# Patient Record
Sex: FEMALE | Race: BLACK OR AFRICAN AMERICAN | NOT HISPANIC OR LATINO | Employment: PART TIME | URBAN - METROPOLITAN AREA
[De-identification: names, ages, dates, MRNs, and addresses within clinical notes are randomized per-mention and may not be internally consistent; named-entity substitution may affect disease eponyms.]

---

## 2017-07-29 ENCOUNTER — HOSPITAL ENCOUNTER (EMERGENCY)
Facility: OTHER | Age: 22
Discharge: HOME | End: 2017-07-29
Attending: EMERGENCY MEDICINE
Payer: MEDICAID

## 2017-07-29 VITALS
SYSTOLIC BLOOD PRESSURE: 106 MMHG | TEMPERATURE: 98 F | DIASTOLIC BLOOD PRESSURE: 62 MMHG | WEIGHT: 130.31 LBS | BODY MASS INDEX: 21.68 KG/M2 | RESPIRATION RATE: 18 BRPM | HEART RATE: 48 BPM | OXYGEN SATURATION: 99 %

## 2017-07-29 DIAGNOSIS — R07.9 CHEST PAIN: ICD-10-CM

## 2017-07-29 DIAGNOSIS — D57.00 SICKLE CELL PAIN CRISIS: Primary | ICD-10-CM

## 2017-07-29 LAB
ALBUMIN SERPL BCP-MCNC: 4.4 G/DL
ALP SERPL-CCNC: 60 U/L
ALT SERPL W/O P-5'-P-CCNC: 10 U/L
ANION GAP SERPL CALC-SCNC: 11 MMOL/L
ANISOCYTOSIS BLD QL SMEAR: SLIGHT
AST SERPL-CCNC: 16 U/L
B-HCG UR QL: NEGATIVE
BACTERIA #/AREA URNS HPF: ABNORMAL /HPF
BASOPHILS # BLD AUTO: 0.01 K/UL
BASOPHILS NFR BLD: 0.2 %
BILIRUB SERPL-MCNC: 1.6 MG/DL
BILIRUB UR QL STRIP: NEGATIVE
BUN SERPL-MCNC: 8 MG/DL
CALCIUM SERPL-MCNC: 9.3 MG/DL
CHLORIDE SERPL-SCNC: 105 MMOL/L
CLARITY UR: CLEAR
CO2 SERPL-SCNC: 24 MMOL/L
COLOR UR: YELLOW
CREAT SERPL-MCNC: 0.8 MG/DL
CTP QC/QA: YES
DIFFERENTIAL METHOD: ABNORMAL
EOSINOPHIL # BLD AUTO: 0.1 K/UL
EOSINOPHIL NFR BLD: 0.8 %
ERYTHROCYTE [DISTWIDTH] IN BLOOD BY AUTOMATED COUNT: 14.2 %
EST. GFR  (AFRICAN AMERICAN): >60 ML/MIN/1.73 M^2
EST. GFR  (NON AFRICAN AMERICAN): >60 ML/MIN/1.73 M^2
GIANT PLATELETS BLD QL SMEAR: PRESENT
GLUCOSE SERPL-MCNC: 100 MG/DL
GLUCOSE UR QL STRIP: NEGATIVE
HCT VFR BLD AUTO: 31.1 %
HGB BLD-MCNC: 11.3 G/DL
HGB UR QL STRIP: ABNORMAL
INR PPP: 1
KETONES UR QL STRIP: NEGATIVE
LEUKOCYTE ESTERASE UR QL STRIP: NEGATIVE
LYMPHOCYTES # BLD AUTO: 1.7 K/UL
LYMPHOCYTES NFR BLD: 25.7 %
MAGNESIUM SERPL-MCNC: 2.3 MG/DL
MCH RBC QN AUTO: 30.4 PG
MCHC RBC AUTO-ENTMCNC: 36.3 G/DL
MCV RBC AUTO: 84 FL
MICROSCOPIC COMMENT: ABNORMAL
MONOCYTES # BLD AUTO: 0.3 K/UL
MONOCYTES NFR BLD: 4 %
NEUTROPHILS # BLD AUTO: 4.5 K/UL
NEUTROPHILS NFR BLD: 69.3 %
NITRITE UR QL STRIP: NEGATIVE
NON-SQ EPI CELLS #/AREA URNS HPF: 2 /HPF
OVALOCYTES BLD QL SMEAR: ABNORMAL
PH UR STRIP: 6 [PH] (ref 5–8)
PLATELET # BLD AUTO: 102 K/UL
PLATELET BLD QL SMEAR: ABNORMAL
PMV BLD AUTO: ABNORMAL FL
POIKILOCYTOSIS BLD QL SMEAR: SLIGHT
POLYCHROMASIA BLD QL SMEAR: ABNORMAL
POTASSIUM SERPL-SCNC: 3.5 MMOL/L
PROT SERPL-MCNC: 8.1 G/DL
PROT UR QL STRIP: ABNORMAL
PROTHROMBIN TIME: 11.6 SEC
RBC # BLD AUTO: 3.72 M/UL
RBC #/AREA URNS HPF: 7 /HPF (ref 0–4)
RETICS/RBC NFR AUTO: 4.3 %
SODIUM SERPL-SCNC: 140 MMOL/L
SP GR UR STRIP: 1.01 (ref 1–1.03)
SQUAMOUS #/AREA URNS HPF: 5 /HPF
TARGETS BLD QL SMEAR: ABNORMAL
URN SPEC COLLECT METH UR: ABNORMAL
UROBILINOGEN UR STRIP-ACNC: NEGATIVE EU/DL
WBC # BLD AUTO: 6.53 K/UL

## 2017-07-29 PROCEDURE — 63600175 PHARM REV CODE 636 W HCPCS: Performed by: EMERGENCY MEDICINE

## 2017-07-29 PROCEDURE — 85610 PROTHROMBIN TIME: CPT

## 2017-07-29 PROCEDURE — 83735 ASSAY OF MAGNESIUM: CPT

## 2017-07-29 PROCEDURE — 81025 URINE PREGNANCY TEST: CPT | Performed by: EMERGENCY MEDICINE

## 2017-07-29 PROCEDURE — 93010 ELECTROCARDIOGRAM REPORT: CPT | Mod: ,,, | Performed by: INTERNAL MEDICINE

## 2017-07-29 PROCEDURE — 81000 URINALYSIS NONAUTO W/SCOPE: CPT

## 2017-07-29 PROCEDURE — 25000003 PHARM REV CODE 250: Performed by: EMERGENCY MEDICINE

## 2017-07-29 PROCEDURE — 80053 COMPREHEN METABOLIC PANEL: CPT

## 2017-07-29 PROCEDURE — 85025 COMPLETE CBC W/AUTO DIFF WBC: CPT

## 2017-07-29 PROCEDURE — 85045 AUTOMATED RETICULOCYTE COUNT: CPT

## 2017-07-29 RX ORDER — ONDANSETRON 2 MG/ML
4 INJECTION INTRAMUSCULAR; INTRAVENOUS
Status: COMPLETED | OUTPATIENT
Start: 2017-07-29 | End: 2017-07-29

## 2017-07-29 RX ORDER — HYDROCODONE BITARTRATE AND ACETAMINOPHEN 5; 325 MG/1; MG/1
1 TABLET ORAL
Status: COMPLETED | OUTPATIENT
Start: 2017-07-29 | End: 2017-07-29

## 2017-07-29 RX ORDER — MORPHINE SULFATE 4 MG/ML
8 INJECTION, SOLUTION INTRAMUSCULAR; INTRAVENOUS
Status: COMPLETED | OUTPATIENT
Start: 2017-07-29 | End: 2017-07-29

## 2017-07-29 RX ORDER — MORPHINE SULFATE 2 MG/ML
6 INJECTION, SOLUTION INTRAMUSCULAR; INTRAVENOUS
Status: COMPLETED | OUTPATIENT
Start: 2017-07-29 | End: 2017-07-29

## 2017-07-29 RX ORDER — HYDROCODONE BITARTRATE AND ACETAMINOPHEN 5; 325 MG/1; MG/1
1 TABLET ORAL EVERY 6 HOURS PRN
Qty: 12 TABLET | Refills: 0 | Status: SHIPPED | OUTPATIENT
Start: 2017-07-29 | End: 2017-08-29

## 2017-07-29 RX ORDER — FOLIC ACID 1 MG/1
1 TABLET ORAL DAILY
Status: ON HOLD | COMMUNITY
End: 2017-08-02 | Stop reason: CLARIF

## 2017-07-29 RX ORDER — ONDANSETRON 4 MG/1
4 TABLET, FILM COATED ORAL EVERY 6 HOURS
Qty: 12 TABLET | Refills: 0 | Status: ON HOLD | OUTPATIENT
Start: 2017-07-29 | End: 2017-08-02 | Stop reason: CLARIF

## 2017-07-29 RX ADMIN — MORPHINE SULFATE 8 MG: 4 INJECTION, SOLUTION INTRAMUSCULAR; INTRAVENOUS at 10:07

## 2017-07-29 RX ADMIN — ONDANSETRON 4 MG: 2 INJECTION INTRAMUSCULAR; INTRAVENOUS at 11:07

## 2017-07-29 RX ADMIN — MORPHINE SULFATE 6 MG: 2 INJECTION, SOLUTION INTRAMUSCULAR; INTRAVENOUS at 08:07

## 2017-07-29 RX ADMIN — SODIUM CHLORIDE 1000 ML: 0.9 INJECTION, SOLUTION INTRAVENOUS at 10:07

## 2017-07-29 RX ADMIN — SODIUM CHLORIDE 1000 ML: 0.9 INJECTION, SOLUTION INTRAVENOUS at 08:07

## 2017-07-29 RX ADMIN — HYDROCODONE BITARTRATE AND ACETAMINOPHEN 1 TABLET: 5; 325 TABLET ORAL at 01:07

## 2017-07-29 NOTE — ED TRIAGE NOTES
Patient reports having right side body pain, especially in her right arm since yesterday. Patient reports non-compliance with her iron and folic acid supplement and also reports that tylenol does not help her pain. She reports that this is how her sickle cell flare ups generally manifest.

## 2017-07-29 NOTE — ED PROVIDER NOTES
"Encounter Date: 7/29/2017    SCRIBE #1 NOTE: I, Nadeen Lord, am scribing for, and in the presence of, Dr. Connors.       History     Chief Complaint   Patient presents with    Sickle Cell Pain Crisis     pt c/o sickle cell pain crisis with 10/10 pain to the R side of body     Time seen by provider: 7:35 AM    This is a 21 y.o. female who presents with complaint of a Sickle Cell Pain Crisis that has persisted for approximately 24 hours.  The patient reports history of Sickle Cell Anemia, CVA, blood transfusions, and left eye surgery.  She states that the pain is localized to the right-side of her body and claims that she can "feel my veins popping in my neck."  She states that it feels like "my muscles aren't working" and also endorses right-sided weakness and anxiety. She woke up and felt normal yesterday, but onset of R arm pain and then weakness started at 11am, and slowly worsened throughout the day. She had to work a double shift despite the pain and weakness and came straight to ED afterwards. She now states her R leg is locked up, and cannot move R arm due to pain. The patient mentions that she had an episode of epistaxis earlier yesterday, but it has since resolved.  She reports no fever, chills, rhinorrhea, sore throat, abdominal pain, CP, or N/V/D.  Although she attempted to alleviate her discomfort with tylenol, she found no relief.  She mentions no other identifying, alleviating, or exacerbating factors.  The patient states that the current presentation of one-sided pain is consistent with prior sickle cell pain crises. She usually has associated one sided weakness with pain crisis, that resolves with treatment. She mentions one episode in 2012 where she didn't get treated fast enough and had a stroke resulting in L eye vision loss and L sided weakness that she needed rehab for. She reports that she only has 2 to 3 crises per year, which seem to be triggered by weather changes, with rare ED visits. "  She reports that she does not take prescription pain medication regularly to treat her sickle cell anemia.  The patient claims that she does not have a PCP, as she recently moved to [place] from [place].      The history is provided by the patient.     Review of patient's allergies indicates:  No Known Allergies  Past Medical History:   Diagnosis Date    Active labor 2014    Sickle cell anemia     STD (female)     Chlamydia/trichomonas     Stroke      Past Surgical History:   Procedure Laterality Date     SECTION  14    EYE SURGERY      left eye     Family History   Problem Relation Age of Onset    Breast cancer Neg Hx     Colon cancer Neg Hx     Ovarian cancer Neg Hx      Social History   Substance Use Topics    Smoking status: Never Smoker    Smokeless tobacco: Never Used    Alcohol use No     Review of Systems   Constitutional: Negative for chills and fever.   HENT: Negative for facial swelling.    Respiratory: Negative for shortness of breath.    Cardiovascular: Negative for chest pain.   Gastrointestinal: Negative for abdominal pain, nausea and vomiting.   Endocrine: Negative for polyuria.   Genitourinary: Negative for dysuria.   Musculoskeletal: Negative for myalgias.        Positive for right-sided pain.   Skin: Negative for rash.   Neurological: Positive for weakness (right-sided). Negative for headaches.   Psychiatric/Behavioral: The patient is nervous/anxious.        Physical Exam     Initial Vitals [17 0648]   BP Pulse Resp Temp SpO2   138/88 62 18 97.7 °F (36.5 °C) 97 %      MAP       104.67         Physical Exam    Nursing note and vitals reviewed.  Constitutional: She appears well-developed and well-nourished. She is not diaphoretic. No distress.   HENT:   Head: Normocephalic and atraumatic.   Right Ear: External ear normal.   Left Ear: External ear normal.   Mouth/Throat: Mucous membranes are dry.   Eyes:   Left prosthetic eye.   Neck: Normal range of motion.    Cardiovascular: Normal rate, regular rhythm and normal heart sounds. Exam reveals no gallop and no friction rub.    No murmur heard.  Pulmonary/Chest: Breath sounds normal. No respiratory distress. She has no wheezes. She has no rhonchi. She has no rales.   Abdominal: Soft. There is no tenderness. There is no rebound and no guarding.   Musculoskeletal: Normal range of motion. She exhibits no edema or tenderness.   Neurological: She is alert and oriented to person, place, and time. She has normal strength. No sensory deficit.   Sensation intact.  Unable to assess strength due to pain limitation.     Skin: Skin is warm and dry. No rash and no abscess noted. No erythema. No pallor.   Psychiatric: She has a normal mood and affect. Her behavior is normal. Judgment and thought content normal.         ED Course   Procedures  Labs Reviewed   CBC W/ AUTO DIFFERENTIAL - Abnormal; Notable for the following:        Result Value    RBC 3.72 (*)     Hemoglobin 11.3 (*)     Hematocrit 31.1 (*)     MCHC 36.3 (*)     Platelets 102 (*)     Platelet Estimate Decreased (*)     All other components within normal limits   COMPREHENSIVE METABOLIC PANEL - Abnormal; Notable for the following:     Total Bilirubin 1.6 (*)     All other components within normal limits   URINALYSIS - Abnormal; Notable for the following:     Protein, UA Trace (*)     Occult Blood UA 1+ (*)     All other components within normal limits   RETICULOCYTES - Abnormal; Notable for the following:     Retic 4.3 (*)     All other components within normal limits   URINALYSIS MICROSCOPIC - Abnormal; Notable for the following:     RBC, UA 7 (*)     Non-Squam Epith 2 (*)     All other components within normal limits   PROTIME-INR   MAGNESIUM   POCT URINE PREGNANCY      Imaging Results          CT Head Without Contrast (Final result)  Result time 07/29/17 09:44:11    Final result by Vance Morin MD (07/29/17 09:44:11)                 Impression:         No intracranial  hemorrhage or mass effect.               Electronically signed by: Dr. Vance Morin MD  Date:     07/29/17  Time:    09:44              Narrative:    CT OF THE HEAD WITHOUT CONTRAST:     Technique: 5 mm axial images were obtained from the vertex to the skullbase, without administration of contrast.    Comparison: None.    Findings:    No intracranial hemorrhage, extra-axial fluid collection, midline shift, or mass effect.  No evidence of an acute cortical infarct or of an infarct in a major vascular territory.  The ventricles are normal in size and configuration, and the basilar cisterns are patent.      The calvarium is unremarkable. Visualized portions of the paranasal sinuses are clear. Visualized mastoid air cells are clear. LEFT ocular prosthesis.                              EKG Readings: (Independently Interpreted)   Initial Reading: No STEMI.   Normal sinus rhythm. Rate of 76. No ST or T wave changes. No previous for comparison.          Medical Decision Making:   History:   Old Medical Records: I decided to obtain old medical records.  Initial Assessment:       21F h/o SCD presents with diffuse R sided body pain and weakness, started yesterday morning and worsening since. Per pt this is typical of her sickle cell pain crisis, with one sided body pain and weakness that resolves with treatment. Unclear cause of pain crisis, possibly related to dehydration or overworking, will also evaluate for worsening anemia. No fever or infectious symptoms, no CP/SOB or sign of acute chest syndrome. Also concern for recurrent CVA; unable to do complete neuro testing on initial exam since pt refuses to move R side due to pain, but will closely monitor after pain control/IVF. Although she reports similar symptoms with pain crisis, she also does have h/o CVA 5 years ago. She is out of TPA window. Will get head CT and re-assess.      Update:  After 1st round Morphine, pt able to fully move leg with 5/5 strength. After 2nd  round, able to move R arm though still feels pain in R shoulder. No neuro deficits, CT head with no acute findings, no sign CVA, and very unlikely TIA given initial reluctance to move R side likely due to pain, not true weakness, and multiple previous similar episodes related to sickle cell pain crises. Labs with Hgb 11.3 and slightly elevated retic count, no other acute process. Pt does have documented (+) sickle screen in 2015, and no recent Rx on [place] , no recent visits to [facility] or other [facility] facilities, so no sign drug seeking or malingering. Observed in ED with mild nausea subsequently, but tolerating PO and minimal RUE pain on re-assessment after a few hours, no recurrent weakness. Pt offered admission for observation but is requesting discharge; advised about close f/u and returning to ED immediately for any recurrent symptoms.       Independently Interpreted Test(s):   I have ordered and independently interpreted EKG Reading(s) - see prior notes  Clinical Tests:   Lab Tests: Ordered and Reviewed  Radiological Study: Ordered and Reviewed  Medical Tests: Ordered and Reviewed            Scribe Attestation:   Scribe #1: I performed the above scribed service and the documentation accurately describes the services I performed. I attest to the accuracy of the note.    Attending Attestation:           Physician Attestation for Scribe:  Physician Attestation Statement for Scribe #1: I, Dr. Connors, reviewed documentation, as scribed by Nadeen Lord in my presence, and it is both accurate and complete.                 ED Course     Clinical Impression:     1. Sickle cell pain crisis    2. Chest pain                                 Matt Connors MD  07/29/17 [id number]

## 2017-07-30 PROBLEM — D57.00 SICKLE CELL PAIN CRISIS: Status: ACTIVE | Noted: 2017-07-30

## 2017-07-30 PROBLEM — I63.9 STROKE: Status: ACTIVE | Noted: 2017-07-30

## 2017-07-30 PROBLEM — D57.00 SICKLE CELL CRISIS: Status: ACTIVE | Noted: 2017-07-30

## 2017-08-01 ENCOUNTER — HOSPITAL ENCOUNTER (INPATIENT)
Facility: HOSPITAL | Age: 22
LOS: 1 days | Discharge: HOME | DRG: 812 | End: 2017-08-03
Attending: EMERGENCY MEDICINE | Admitting: HOSPITALIST
Payer: COMMERCIAL

## 2017-08-01 DIAGNOSIS — R07.9 CHEST PAIN: ICD-10-CM

## 2017-08-01 DIAGNOSIS — D57.219 HEMOGLOBIN S-C DISEASE, WITH UNSPECIFIED CRISIS: ICD-10-CM

## 2017-08-01 DIAGNOSIS — D57.00 SICKLE CELL CRISIS: Primary | ICD-10-CM

## 2017-08-01 PROBLEM — G45.9 TIA (TRANSIENT ISCHEMIC ATTACK): Status: ACTIVE | Noted: 2017-07-30

## 2017-08-01 PROCEDURE — 99285 EMERGENCY DEPT VISIT HI MDM: CPT | Mod: ,,, | Performed by: EMERGENCY MEDICINE

## 2017-08-01 PROCEDURE — 99285 EMERGENCY DEPT VISIT HI MDM: CPT | Mod: 25

## 2017-08-01 PROCEDURE — 25000003 PHARM REV CODE 250

## 2017-08-01 PROCEDURE — 93005 ELECTROCARDIOGRAM TRACING: CPT

## 2017-08-01 PROCEDURE — 96375 TX/PRO/DX INJ NEW DRUG ADDON: CPT

## 2017-08-01 PROCEDURE — 96374 THER/PROPH/DIAG INJ IV PUSH: CPT

## 2017-08-01 PROCEDURE — 96376 TX/PRO/DX INJ SAME DRUG ADON: CPT

## 2017-08-01 PROCEDURE — 93010 ELECTROCARDIOGRAM REPORT: CPT | Mod: ,,, | Performed by: INTERNAL MEDICINE

## 2017-08-01 PROCEDURE — 63600175 PHARM REV CODE 636 W HCPCS

## 2017-08-01 PROCEDURE — 96361 HYDRATE IV INFUSION ADD-ON: CPT

## 2017-08-01 RX ORDER — HYDROMORPHONE HYDROCHLORIDE 1 MG/ML
1 INJECTION, SOLUTION INTRAMUSCULAR; INTRAVENOUS; SUBCUTANEOUS
Status: COMPLETED | OUTPATIENT
Start: 2017-08-01 | End: 2017-08-01

## 2017-08-01 RX ORDER — SODIUM CHLORIDE 9 MG/ML
1000 INJECTION, SOLUTION INTRAVENOUS
Status: COMPLETED | OUTPATIENT
Start: 2017-08-01 | End: 2017-08-02

## 2017-08-01 RX ORDER — ONDANSETRON 2 MG/ML
4 INJECTION INTRAMUSCULAR; INTRAVENOUS
Status: COMPLETED | OUTPATIENT
Start: 2017-08-01 | End: 2017-08-01

## 2017-08-01 RX ADMIN — SODIUM CHLORIDE 1000 ML: 0.9 INJECTION, SOLUTION INTRAVENOUS at 11:08

## 2017-08-01 RX ADMIN — ONDANSETRON 4 MG: 2 INJECTION INTRAMUSCULAR; INTRAVENOUS at 11:08

## 2017-08-01 RX ADMIN — HYDROMORPHONE HYDROCHLORIDE 1 MG: 1 INJECTION, SOLUTION INTRAMUSCULAR; INTRAVENOUS; SUBCUTANEOUS at 11:08

## 2017-08-02 PROCEDURE — 25000003 PHARM REV CODE 250: Performed by: STUDENT IN AN ORGANIZED HEALTH CARE EDUCATION/TRAINING PROGRAM

## 2017-08-02 PROCEDURE — 11000001 HC ACUTE MED/SURG PRIVATE ROOM

## 2017-08-02 PROCEDURE — 63600175 PHARM REV CODE 636 W HCPCS

## 2017-08-02 PROCEDURE — 97165 OT EVAL LOW COMPLEX 30 MIN: CPT

## 2017-08-02 PROCEDURE — 99223 1ST HOSP IP/OBS HIGH 75: CPT | Mod: ,,, | Performed by: HOSPITALIST

## 2017-08-02 PROCEDURE — 63600175 PHARM REV CODE 636 W HCPCS: Performed by: HOSPITALIST

## 2017-08-02 PROCEDURE — 63600175 PHARM REV CODE 636 W HCPCS: Performed by: STUDENT IN AN ORGANIZED HEALTH CARE EDUCATION/TRAINING PROGRAM

## 2017-08-02 PROCEDURE — 25000003 PHARM REV CODE 250: Performed by: HOSPITALIST

## 2017-08-02 PROCEDURE — A4216 STERILE WATER/SALINE, 10 ML: HCPCS | Performed by: STUDENT IN AN ORGANIZED HEALTH CARE EDUCATION/TRAINING PROGRAM

## 2017-08-02 PROCEDURE — 94761 N-INVAS EAR/PLS OXIMETRY MLT: CPT

## 2017-08-02 RX ORDER — SODIUM CHLORIDE 0.9 % (FLUSH) 0.9 %
3 SYRINGE (ML) INJECTION EVERY 8 HOURS
Status: DISCONTINUED | OUTPATIENT
Start: 2017-08-02 | End: 2017-08-03 | Stop reason: HOSPADM

## 2017-08-02 RX ORDER — FERROUS SULFATE 325(65) MG
325 TABLET, DELAYED RELEASE (ENTERIC COATED) ORAL DAILY
Status: DISCONTINUED | OUTPATIENT
Start: 2017-08-02 | End: 2017-08-03 | Stop reason: HOSPADM

## 2017-08-02 RX ORDER — NAPROXEN SODIUM 220 MG/1
81 TABLET, FILM COATED ORAL DAILY
Status: DISCONTINUED | OUTPATIENT
Start: 2017-08-02 | End: 2017-08-03 | Stop reason: HOSPADM

## 2017-08-02 RX ORDER — MAGNESIUM SULFATE HEPTAHYDRATE 40 MG/ML
2 INJECTION, SOLUTION INTRAVENOUS ONCE
Status: COMPLETED | OUTPATIENT
Start: 2017-08-02 | End: 2017-08-02

## 2017-08-02 RX ORDER — ENOXAPARIN SODIUM 100 MG/ML
40 INJECTION SUBCUTANEOUS EVERY 24 HOURS
Status: DISCONTINUED | OUTPATIENT
Start: 2017-08-02 | End: 2017-08-02

## 2017-08-02 RX ORDER — POTASSIUM CHLORIDE 20 MEQ/1
20 TABLET, EXTENDED RELEASE ORAL ONCE
Status: DISCONTINUED | OUTPATIENT
Start: 2017-08-02 | End: 2017-08-02

## 2017-08-02 RX ORDER — HYDROMORPHONE HYDROCHLORIDE 2 MG/ML
2 INJECTION, SOLUTION INTRAMUSCULAR; INTRAVENOUS; SUBCUTANEOUS
Status: DISPENSED | OUTPATIENT
Start: 2017-08-02 | End: 2017-08-03

## 2017-08-02 RX ORDER — POTASSIUM CHLORIDE 20 MEQ/1
40 TABLET, EXTENDED RELEASE ORAL ONCE
Status: COMPLETED | OUTPATIENT
Start: 2017-08-02 | End: 2017-08-02

## 2017-08-02 RX ORDER — SODIUM CHLORIDE 9 MG/ML
INJECTION, SOLUTION INTRAVENOUS CONTINUOUS
Status: DISCONTINUED | OUTPATIENT
Start: 2017-08-02 | End: 2017-08-03 | Stop reason: HOSPADM

## 2017-08-02 RX ORDER — HYDROMORPHONE HYDROCHLORIDE 1 MG/ML
2 INJECTION, SOLUTION INTRAMUSCULAR; INTRAVENOUS; SUBCUTANEOUS EVERY 6 HOURS PRN
Status: DISCONTINUED | OUTPATIENT
Start: 2017-08-02 | End: 2017-08-02

## 2017-08-02 RX ORDER — AMOXICILLIN 250 MG
1 CAPSULE ORAL DAILY PRN
Status: DISCONTINUED | OUTPATIENT
Start: 2017-08-02 | End: 2017-08-03 | Stop reason: HOSPADM

## 2017-08-02 RX ORDER — HYDROXYUREA 500 MG/1
1000 CAPSULE ORAL DAILY
Status: DISCONTINUED | OUTPATIENT
Start: 2017-08-02 | End: 2017-08-03 | Stop reason: HOSPADM

## 2017-08-02 RX ORDER — OXYCODONE HYDROCHLORIDE 5 MG/1
15 TABLET ORAL EVERY 4 HOURS PRN
Status: DISCONTINUED | OUTPATIENT
Start: 2017-08-02 | End: 2017-08-03 | Stop reason: HOSPADM

## 2017-08-02 RX ORDER — ONDANSETRON 8 MG/1
8 TABLET, ORALLY DISINTEGRATING ORAL EVERY 8 HOURS PRN
Status: DISCONTINUED | OUTPATIENT
Start: 2017-08-02 | End: 2017-08-03 | Stop reason: HOSPADM

## 2017-08-02 RX ORDER — FOLIC ACID 1 MG/1
1 TABLET ORAL DAILY
Status: DISCONTINUED | OUTPATIENT
Start: 2017-08-02 | End: 2017-08-03 | Stop reason: HOSPADM

## 2017-08-02 RX ORDER — HYDROMORPHONE HYDROCHLORIDE 1 MG/ML
1 INJECTION, SOLUTION INTRAMUSCULAR; INTRAVENOUS; SUBCUTANEOUS
Status: COMPLETED | OUTPATIENT
Start: 2017-08-02 | End: 2017-08-02

## 2017-08-02 RX ORDER — ACETAMINOPHEN 500 MG
500 TABLET ORAL EVERY 6 HOURS PRN
Status: DISCONTINUED | OUTPATIENT
Start: 2017-08-02 | End: 2017-08-03 | Stop reason: HOSPADM

## 2017-08-02 RX ORDER — MORPHINE SULFATE 4 MG/ML
4 INJECTION, SOLUTION INTRAMUSCULAR; INTRAVENOUS EVERY 6 HOURS PRN
Status: DISCONTINUED | OUTPATIENT
Start: 2017-08-02 | End: 2017-08-02

## 2017-08-02 RX ORDER — HYDROMORPHONE HYDROCHLORIDE 1 MG/ML
2 INJECTION, SOLUTION INTRAMUSCULAR; INTRAVENOUS; SUBCUTANEOUS
Status: DISCONTINUED | OUTPATIENT
Start: 2017-08-02 | End: 2017-08-02

## 2017-08-02 RX ADMIN — ASPIRIN 81 MG CHEWABLE TABLET 81 MG: 81 TABLET CHEWABLE at 09:08

## 2017-08-02 RX ADMIN — ONDANSETRON 8 MG: 4 TABLET, ORALLY DISINTEGRATING ORAL at 06:08

## 2017-08-02 RX ADMIN — HYDROMORPHONE HYDROCHLORIDE 2 MG: 1 INJECTION, SOLUTION INTRAMUSCULAR; INTRAVENOUS; SUBCUTANEOUS at 06:08

## 2017-08-02 RX ADMIN — HYDROMORPHONE HYDROCHLORIDE 2 MG: 2 INJECTION INTRAMUSCULAR; INTRAVENOUS; SUBCUTANEOUS at 08:08

## 2017-08-02 RX ADMIN — FERROUS SULFATE TAB EC 325 MG (65 MG FE EQUIVALENT) 325 MG: 325 (65 FE) TABLET DELAYED RESPONSE at 09:08

## 2017-08-02 RX ADMIN — SODIUM CHLORIDE: 0.9 INJECTION, SOLUTION INTRAVENOUS at 10:08

## 2017-08-02 RX ADMIN — FOLIC ACID 1 MG: 1 TABLET ORAL at 09:08

## 2017-08-02 RX ADMIN — HYDROMORPHONE HYDROCHLORIDE 2 MG: 2 INJECTION INTRAMUSCULAR; INTRAVENOUS; SUBCUTANEOUS at 12:08

## 2017-08-02 RX ADMIN — HYDROMORPHONE HYDROCHLORIDE 2 MG: 2 INJECTION INTRAMUSCULAR; INTRAVENOUS; SUBCUTANEOUS at 11:08

## 2017-08-02 RX ADMIN — SODIUM CHLORIDE: 0.9 INJECTION, SOLUTION INTRAVENOUS at 09:08

## 2017-08-02 RX ADMIN — MAGNESIUM SULFATE IN WATER 2 G: 40 INJECTION, SOLUTION INTRAVENOUS at 09:08

## 2017-08-02 RX ADMIN — HYDROMORPHONE HYDROCHLORIDE 2 MG: 2 INJECTION INTRAMUSCULAR; INTRAVENOUS; SUBCUTANEOUS at 06:08

## 2017-08-02 RX ADMIN — Medication 3 ML: at 02:08

## 2017-08-02 RX ADMIN — HYDROMORPHONE HYDROCHLORIDE 2 MG: 1 INJECTION, SOLUTION INTRAMUSCULAR; INTRAVENOUS; SUBCUTANEOUS at 09:08

## 2017-08-02 RX ADMIN — HYDROMORPHONE HYDROCHLORIDE 1 MG: 1 INJECTION, SOLUTION INTRAMUSCULAR; INTRAVENOUS; SUBCUTANEOUS at 02:08

## 2017-08-02 RX ADMIN — POTASSIUM CHLORIDE 40 MEQ: 1500 TABLET, EXTENDED RELEASE ORAL at 09:08

## 2017-08-02 RX ADMIN — ONDANSETRON 8 MG: 4 TABLET, ORALLY DISINTEGRATING ORAL at 12:08

## 2017-08-02 RX ADMIN — HYDROXYUREA 1000 MG: 500 CAPSULE ORAL at 09:08

## 2017-08-02 NOTE — H&P
"[facility] Hospital Medicine  History & Physical    Patient Name: Murali Tarango  MRN: [id number]  Admission Date: 8/1/2017  Attending Physician: Maykel Zendejas MD   Primary Care Provider: Esteban Powell MD    [facility] Medicine Team: [facility] 1 Brandi Candelario MD     Patient information was obtained from patient and past medical records.     Subjective:     Principal Problem:Sickle cell crisis    Chief Complaint:   Chief Complaint   Patient presents with    Sickle Cell Pain Crisis     Pt was seen in [place] at [facility] today and was d/c. Pt states that she is still in a lot of pain and that it hurts all over.       HPI: Pt is a 20 y/o F with history of sickle cell disease (Hgb SC), h/o CVA (w/monoocular weakness in 2014), who presents for right arm pain.     Pt reports pain crises started last Friday - she initially presented to [facility] and they discharged her in a few hours. Her mom then picked her up and brought her to [place] ([facility] ER) who discharged after a day as well. She came back to [place] (this is her home) and was in "unbearable pain" so came to the [facility].   Pt does not take hydroxyurea or folate tabs.     She reports that her pain is a  10/10 shooting pain starting at her shoulders and radiating down her right arms. She denies any pain in her right leg. She denies lack of ROM just complains of pain limiting movement.   In the ER, there was concern that she may have a stoke but MRI brain was negative.     Pt reports avg of 3 crises per year (early, mid and end of year) that is associated with temp change and weather change. Her episodes range from a few hours to a few days.   She denies any recent illnesses, fevers, chills, SOB. Her chest pain has resolved. She has never seen a sickle cell specialist. She is a mother of 2 children - 1 yrs and 2 yrs and had no complications during pregnancy.    Past Medical History:   Diagnosis Date    Active labor 11/24/2014 "    Encounter for blood transfusion     Sickle cell anemia     STD (female)     Chlamydia/trichomonas     Stroke        Past Surgical History:   Procedure Laterality Date     SECTION  14    EYE SURGERY      left eye       Review of patient's allergies indicates:  No Known Allergies    Current Facility-Administered Medications on File Prior to Encounter   Medication    [COMPLETED] magnesium sulfate 2g in water 50mL IVPB (premix)    [COMPLETED] potassium chloride SA CR tablet 40 mEq    [DISCONTINUED] 0.9%  NaCl infusion    [DISCONTINUED] acetaminophen tablet 650 mg    [DISCONTINUED] aspirin chewable tablet 81 mg    [DISCONTINUED] aspirin tablet 325 mg    [DISCONTINUED] dextrose 50% injection 12.5 g    [DISCONTINUED] dextrose 50% injection 25 g    [DISCONTINUED] diphenhydrAMINE injection 25 mg    [DISCONTINUED] ferrous sulfate tablet 325 mg    [DISCONTINUED] folic acid tablet 1 mg    [DISCONTINUED] glucagon (human recombinant) injection 1 mg    [DISCONTINUED] glucose chewable tablet 16 g    [DISCONTINUED] glucose chewable tablet 24 g    [DISCONTINUED] hydroxyurea capsule 500 mg    [DISCONTINUED] morphine injection 2 mg    [DISCONTINUED] ondansetron injection 4 mg    [DISCONTINUED] oxycodone-acetaminophen  mg per tablet 1 tablet    [DISCONTINUED] polyethylene glycol packet 17 g    [DISCONTINUED] promethazine (PHENERGAN) 12.5 mg in dextrose 5 % 50 mL IVPB    [DISCONTINUED] senna-docusate 8.6-50 mg per tablet 1 tablet     Current Outpatient Prescriptions on File Prior to Encounter   Medication Sig    aspirin 81 MG Chew Take 1 tablet (81 mg total) by mouth once daily.    ferrous sulfate 325 mg (65 mg iron) Tab tablet Take 1 tablet (325 mg total) by mouth 2 (two) times daily.    folic acid (FOLVITE) 1 MG tablet Take 1 mg by mouth once daily.    hydrocodone-acetaminophen 5-325mg (NORCO) 5-325 mg per tablet Take 1 tablet by mouth every 6 (six) hours as needed for Pain.     hydroxyurea (HYDREA) 500 mg Cap Take 1 capsule (500 mg total) by mouth once daily.    ondansetron (ZOFRAN) 4 MG tablet Take 1 tablet (4 mg total) by mouth every 6 (six) hours.     Family History     None        Social History Main Topics    Smoking status: Never Smoker    Smokeless tobacco: Never Used    Alcohol use No    Drug use: No    Sexual activity: Yes     Partners: Male     Birth control/ protection: None      Comment: single     Review of Systems   Constitutional: Positive for appetite change (decreased) and fatigue. Negative for fever and unexpected weight change.   HENT: Negative for congestion and trouble swallowing.    Respiratory: Negative for chest tightness and shortness of breath.    Cardiovascular: Negative for chest pain, palpitations and leg swelling.   Gastrointestinal: Negative for abdominal distention, abdominal pain, constipation, diarrhea, nausea and vomiting.   Musculoskeletal: Positive for arthralgias and back pain.   Skin: Negative for color change.   Allergic/Immunologic: Negative for immunocompromised state.   Neurological: Negative for dizziness and light-headedness.     Objective:     Vital Signs (Most Recent):  Temp: 99.1 °F (37.3 °C) (08/01/17 2003)  Pulse: 62 (08/02/17 0440)  Resp: 14 (08/02/17 0440)  BP: 138/71 (08/02/17 0440)  SpO2: 100 % (08/02/17 0515) Vital Signs (24h Range):  Temp:  [99.1 °F (37.3 °C)-99.8 °F (37.7 °C)] 99.1 °F (37.3 °C)  Pulse:  [57-79] 62  Resp:  [14-20] 14  SpO2:  [99 %-100 %] 100 %  BP: (138-155)/(71-94) 138/71     Weight: 59 kg (130 lb)  Body mass index is 21.63 kg/m².    Physical Exam   Constitutional: She is oriented to person, place, and time. She appears well-developed and well-nourished.   HENT:   Head: Normocephalic and atraumatic.   Eyes: Pupils are equal, round, and reactive to light.   Neck: No JVD present.   Cardiovascular: Normal rate, regular rhythm, normal heart sounds and intact distal pulses.    No murmur heard.  Pulmonary/Chest:  "Effort normal and breath sounds normal. No respiratory distress. She has no wheezes.   Abdominal: Soft. Bowel sounds are normal. She exhibits no distension. There is no tenderness.   Musculoskeletal: She exhibits no edema.   Pt did not allow me to assess ROM. Complained of 10/10 pain in her right arm   Neurological: She is alert and oriented to person, place, and time.   Skin: Skin is warm and dry. Capillary refill takes less than 2 seconds.     Significant Labs:   CBC:   Recent Labs  Lab 07/31/17  0529 07/31/17  1547 08/01/17  0512   WBC 4.64 4.97 4.57   HGB 9.2* 9.8* 9.8*   HCT 24.8* 26.4* 26.9*   PLT 79* 77* 92*     CMP:   Recent Labs  Lab 07/31/17  0529 08/01/17  0512    137   K 3.4* 3.3*    103   CO2 24 27    86   BUN <5* 5*   CREATININE 0.6 0.5   CALCIUM 8.3* 8.7   PROT 6.3 6.7   ALBUMIN 3.6 3.7   BILITOT 1.3* 1.5*   ALKPHOS 44* 48*   AST 13 15   ALT 9* 8*   ANIONGAP 7* 7*   EGFRNONAA >60.0 >60.0     Significant Imaging: I have reviewed and interpreted all pertinent imaging results/findings within the past 24 hours.    Assessment/Plan:   Pt is a 20 y/o F with history of sickle cell disease (Hgb SC), h/o CVA (w/monoocular weakness on Rt in 2014), who presents for right arm pain.     Sicke cell pain crisis  - retic count 4.5% on 7/30/17 indicative of adequate body response  - dilaudid 2mg q6h  (verbalizes that dilaudid 1mg does not relieve her pain only "takes the edge off")  - prn morphine or tylenol in between for pain relief.   - prn zofran for nausea  - not concerned for infection (no leukocytosis, no fevers, no symptoms)  - continuing aspirin & iron supplement; pt should start hydroxyurea on discharge    Normocytic Anemia  - Hgb 9.8 on admission  - haptoglobin < 10 and retic > 2% consistent with intravascular hemolysis in sickle cell disease & appropriate compensatory body response  - continue to monitor; indications to transfuse if symptomatic anemia    Thrombocytopenia  - platelet count " 92K  - thrombocytopenia is unusual in sickle cell disease  - suspect this is likely 2/2 heparin so will hold enoxaparin at this time. Consider HIT vs ITP    Right arm pain  - suspect vasooclussive pain crisis  - symptom mgmt/pain control  - consider PT/OT consult    Hypokalemia  - 3.3 on admission-  K replaced    VTE Risk Mitigation         Ordered     enoxaparin injection 40 mg  Daily     Route:  Subcutaneous        08/02/17 0512     Medium Risk of VTE  Once      08/02/17 0512        Brandi Candelario MD  Department of Hospital Medicine   [facility]

## 2017-08-02 NOTE — HPI
"Pt is a 20 y/o F with history of sickle cell disease (Hgb SC), h/o CVA (w/monoocular weakness in 2014), who presents for right arm pain.   Pt reports pain crises started last Friday - she initially presented to [facility] and they discharged her in a few hours. Her mom then picked her up and brought her to [place] ([facility]) who discharged after a day as well. She came back to [place] (this is her home) and was in "unbearable pain" so came to the [facility].   Pt does not take hydroxyurea or folate tabs.   She reports that her pain is a  10/10 shooting pain starting at her shoulders and radiating down her right arms. She denies any pain in her right leg. She denies lack of ROM just complains of pain limiting movement.   In the ER, there was concern that she may have a stoke but MRI brain was negative.   Pt reports avg of 3 crises per year (early, mid and end of year) that is associated with temp change and weather change. Her episodes range from a few hours to a few days.   She denies any recent illnesses, fevers, chills, SOB. Her chest pain has resolved. She has never seen a sickle cell specialist. She is a mother of 2 children - 1 yrs and 2 yrs and had no complications during pregnancy.  "

## 2017-08-02 NOTE — PT/OT/SLP EVAL
Occupational Therapy  Evaluation/ Discharge     Murali Tarango   MRN: [id number]   Admitting Diagnosis: Sickle cell crisis    OT Date of Treatment: 17   OT Start Time: 1529  OT Stop Time: 1550  OT Total Time (min): 21 min    Billable Minutes:  Evaluation 13 minutes  Therapeutic Exercise 8 minutes    Diagnosis: Sickle cell crisis       Past Medical History:   Diagnosis Date    Active labor 2014    Encounter for blood transfusion     Sickle cell anemia     STD (female)     Chlamydia/trichomonas     Stroke       Past Surgical History:   Procedure Laterality Date     SECTION  14    EYE SURGERY      left eye     General Precautions: Standard, fall  Orthopedic Precautions: N/A  Braces: N/A    Patient History:  Living Environment  Lives With: child(west), dependent  Living Arrangements: house  Number of Stairs Within Home: 15  Stair Railings at Home: inside, present on right side  Transportation Available: family or friend will provide  Living Environment Comment: Pt lives in a 2sh w/ 2 dependent children. Pt bedroom and bath on 2nd floor. 1st floor is livable. Pt's brothers will assist upon D/C and pt's mother will care for kids   Equipment Currently Used at Home: none    Prior level of function:   Bed Mobility/Transfers: independent  Grooming: independent  Bathing: independent  Upper Body Dressing: independent  Lower Body Dressing: independent  Toileting: independent  Home Management Skills: independent  Homemaking Responsibilities: Yes  Occupation:  (work at [employer];  PRN)     Dominant hand: right    Subjective:  Communicated with nurse prior to session.  Pt agreeable to skilled OT services.  Chief Complaint: RUE debility   Patient/Family stated goals: return to PLOF    Pain/Comfort  Pain Rating 1: 4/10 (Pt stated RUE didnt feel painful but tight w/ ROM)  Location - Side 1: Right  Location - Orientation 1: upper  Location 1: arm  Pain Addressed 1: Distraction  Pain  Rating Post-Intervention 1: 6/10    Objective:  Pt received toileting indep in bathroom. Pt performed RUE exercises w/ increased time.      Cognitive Exam:  Oriented to: Person, Place, Time and Situation  Follows Commands/attention: Follows multistep  commands  Communication: clear/fluent  Memory:  No Deficits noted  Safety awareness/insight to disability: intact  Coping skills/emotional control: Appropriate to situation    Visual/perceptual:  Intact     Physical Exam:  Postural examination/scapula alignment: No postural abnormalities identified  Skin integrity: Visible skin intact  Edema: None noted     Sensation:   Intact    Upper Extremity Range of Motion:  Right Upper Extremity: Pt shoulder flx at 45 degrees of AROM; but after writing therpaist performed PROM, pt able to achieve full ROM   Left Upper Extremity: WFL    Upper Extremity Strength:  Right Upper Extremity: not tested  Left Upper Extremity: WFL   Strength: R: 3+/5    L: 4/5  Fine motor coordination:   Intact    Gross motor coordination: WFL    Functional Mobility:  Transfers:  Sit <> Stand Assistance: Supervision  Sit <> Stand Assistive Device: No Assistive Device    Functional Ambulation: Pt ambulated ~200 ft indep. Pt ascended/descended 15 stairs at sba w/ HR.     Activities of Daily Living:  UE Dressing Level of Assistance: Set-up Assistance (donned gown as robe)  LE Dressing Level of Assistance: Independent (donned/doffed socks)    Balance:   Static Sit: NORMAL: No deviations seen in posture held statically  Dynamic Sit: NORMAL: No deviations seen in posture held dynamically  Static Stand: NORMAL: No deviations seen in posture held statically  Dynamic stand: NORMAL: No deviations seen in posture held dynamically    Therapeutic Activities and Exercises:  PROM for shoulder flx and abduction for 10 reps  AROM for elbow flx and gross grasp for 10 reps.  AROM shldr flex and abduction for 3 reps a piece after PROM performed.  Pt educated on HEP for  "RUE.   Pt provided w/ yellow therband and squeeze ball for RUE strengthening     AM-PAC 6 CLICK ADL  How much help from another person does this patient currently need?  1 = Unable, Total/Dependent Assistance  2 = A lot, Maximum/Moderate Assistance  3 = A little, Minimum/Contact Guard/Supervision  4 = None, Modified [place]/Independent    Putting on and taking off regular lower body clothing? : 4  Bathing (including washing, rinsing, drying)?: 4  Toileting, which includes using toilet, bedpan, or urinal? : 4  Putting on and taking off regular upper body clothing?: 4  Taking care of personal grooming such as brushing teeth?: 4  Eating meals?: 4  Total Score: 24    AM-PAC Raw Score CMS "G-Code Modifier Level of Impairment Assistance   6 % Total / Unable   7 - 9 CM 80 - 100% Maximal Assist   10-14 CL 60 - 80% Moderate Assist   15 - 19 CK 40 - 60% Moderate Assist   20 - 22 CJ 20 - 40% Minimal Assist   23 CI 1-20% SBA / CGA   24 CH 0% Independent/ Mod I       Patient left seated EOB with call button in reach    Assessment:  Murali Tarango is a 21 y.o. female with a medical diagnosis of Sickle cell crisis and presents with Impairments listed below. Pt is not currently displaying a need for acute OT services. D/C acute OT services and recommend pt D/C home.    Rehab identified problem list/impairments: Rehab identified problem list/impairments: weakness, decreased upper extremity function    Rehab potential is good.    Activity tolerance: Good    Discharge recommendations: Discharge Facility/Level Of Care Needs: home     Barriers to discharge: Barriers to Discharge: None    Equipment recommendations: none     GOALS:    Occupational Therapy Goals        Problem: Occupational Therapy Goal    Goal Priority Disciplines Outcome Interventions   Occupational Therapy Goal     OT, PT/OT     Description:  is currently performing ADLs, functional mobility & t/fs without assistance and displays age-appropriate " strength, endurance & balance. OT services are not recommended at this time and patient is safe to D/C home.                      PLAN:  Patient to be seen  (D/C acute OT services ) to address the above listed problems via    Plan of Care expires:    Plan of Care reviewed with: patient    Jackson Ma, OT  08/02/2017

## 2017-08-02 NOTE — PROVIDER PROGRESS NOTES - EMERGENCY DEPT.
Encounter Date: 8/1/2017    ED Physician Progress Notes       SCRIBE NOTE: I, Jeff Araujo , am scribing for, and in the presence of,  Dr. Núñez .  Physician Statement: I, Dr. Núñez , personally performed the services described in this documentation as scribed by Jeff Araujo  in my presence, and it is both accurate and complete.      EKG - STEMI Decision  Initial Reading: No STEMI present.

## 2017-08-02 NOTE — MEDICAL/APP STUDENT
Progress Note  Hospital Medicine    Patient Name: Murali Tarango  YOB: 1995    Admit Date: 8/1/2017 (Hospital Day: 1)    SUBJECTIVE:     Reason for Admission:  Sickle cell crisis  See H&P for detailed presentating history and ROS.      Hospital Course: Ms. 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    Patient reports pain crisis started last Friday. Initially presented to [facility], who discharged her after a few hours. Went to [facility] ED yesterday morning and was d/c. Pain was so bad she came to [facility].    Reported 10/10 pain on presentation to [facility]. Pain localized to her right arm/shoulder and chest from midline to right shoulder. MRI brain was negative for findings of stroke.    Interval history: Patient says pain has improved on pain medications. Says right arm and chest still hurt, but feel better than on presentation. Reports significant weakness in RUE. ROS is negative for fevers, chills, SOB, headache, abdominal pain, n/v, LE weakness or pain.    OBJECTIVE:     Vital Signs Range (Last 24H):  Temp:  [96.7 °F (35.9 °C)-99.1 °F (37.3 °C)]   Pulse:  [55-79]   Resp:  [14-20]   BP: (122-155)/(69-94)   SpO2:  [99 %-100 %] Body mass index is 21.63 kg/m².  Wt Readings from Last 1 Encounters:   08/02/17 0623 59 kg (130 lb)   08/01/17 2003 59 kg (130 lb)       I & O (Last 24H):No intake or output data in the 24 hours ending 08/02/17 0811    Physical Exam:  General: well developed, well nourished, no distress  Eyes: negative findings: Right eye examination normal (LORRI, normal ocular motion), positive findings: Left Eye Prosthesis Present.  Lungs:  clear to auscultation bilaterally and normal respiratory effort  Cardiovascular: Heart: regular rate and rhythm, S1, S2 normal, no murmur, click, rub or gallop. Chest Wall: no tenderness. Extremities: no cyanosis or edema, or clubbing. Pulses: not  "examined.  Abdomen/Rectal: Abdomen: Soft, non-distended, normal bowel sounds, no masses felt, no splenomegaly; reports right-sided abdominal tenderness on palpation, however does not report tenderness on palpation for liver edge. Rectal: not examined  Musculoskeletal:Right upper extremity weakness (3/5 strength)    Diagnostic Results:  Lab Results   Component Value Date    WBC 4.57 08/01/2017    HGB 9.8 (L) 08/01/2017    HCT 26.9 (L) 08/01/2017    MCV 86 08/01/2017    PLT 92 (L) 08/01/2017     No results for input(s): GLU, NA, K, CL, CO2, BUN, CREATININE, CALCIUM, MG in the last 24 hours.  Lab Results   Component Value Date    INR 1.0 07/29/2017    INR 1.0 11/19/2014     No results found for: HGBA1C  No results for input(s): POCTGLUCOSE in the last 72 hours.    ASSESSMENT/PLAN:     Active Hospital Problems    Diagnosis  POA    *Sickle cell crisis [D57.00]  Yes      Resolved Hospital Problems    Diagnosis Date Resolved POA   No resolved problems to display.       Problems:    1.) Sickle Cell Crisis:  -- retic count 4.5% on 7/30/17 indicative of adequate body response  - dilaudid 2mg q6h  (verbalizes that dilaudid 1mg does not relieve her pain only "takes the edge off")  - prn morphine or tylenol in between for pain relief.   - prn zofran for nausea  - not concerned for infection (no leukocytosis, no fevers, no symptoms)  - continuing aspirin & iron supplement; pt should start hydroxyurea on discharge    2.) Right Arm Pain:  -pain being controlled on IV pain meds  -consider PT/OT      Adan Tadeo, MS3  [employer] [employer]  "

## 2017-08-02 NOTE — PLAN OF CARE
Short Progress Note    Patient was discussed and seen on rounds today. The patient was started on IV fluid resuscitation at 200ml/hr normal saline. Patient was also started on scheduled pain control, hydroxyurea, folate. Patient stated she has new onset right arm weakness, MRI without contrast and CT head were unremarkable. Patient refused MRI with contrast. PT/OT ordered for right arm weakness. Will continue to follow patient progress.      Loco Waldron MD

## 2017-08-02 NOTE — PLAN OF CARE
DR. SAMANTA VAZQUEZ    PT HAS A RIDE HOME AND FAMILY SUPPORT WITH CLOSE FAMILY AND FRIENDS.       08/02/17 1424   Discharge Assessment   Assessment Type Discharge Planning Assessment   Confirmed/corrected address and phone number on facesheet? Yes   Assessment information obtained from? Patient   Expected Length of Stay (days) 3   Communicated expected length of stay with patient/caregiver yes   Prior to hospitilization cognitive status: Alert/Oriented   Prior to hospitalization functional status: Independent   Current cognitive status: Alert/Oriented   Current Functional Status: Independent   Arrived From home or self-care   Lives With child(west), dependent   Able to Return to Prior Arrangements yes   Is patient able to care for self after discharge? Yes   Does the patient have family/friends to help with healtcare needs after discharge? yes   How many people do you have in your home that can help with your care after discharge? 0   Patient's perception of discharge disposition home or selfcare   Readmission Within The Last 30 Days no previous admission in last 30 days   Patient currently being followed by outpatient case management? No   Patient currently receives home health services? No   Patient previously received home health services and would like to resume services if necessary? No   Does the patient currently use HME? No   Patient currently receives private duty nursing? No   Patient currently receives any other outside agency services? No   Equipment Currently Used at Home none   Do you have any problems affording any of your prescribed medications? No   Is the patient taking medications as prescribed? yes   Do you have any financial concerns preventing you from receiving the healthcare you need? No   Does the patient have transportation to healthcare appointments? Yes   Transportation Available car;family or friend will provide   On Dialysis? No   Does the patient receive services at the [facility]? No   Are there any open cases? No   Discharge Plan A Home   Discharge Plan B Home with family;Home Health   Patient/Family In Agreement With Plan yes

## 2017-08-02 NOTE — NURSING
"MD paged x2 in reference to patient refusing to have MRI. "  States I had a MRI yesterday at [facility].  "

## 2017-08-02 NOTE — NURSING
"MD on call for IM1 notified patient refused to take oral potasium supplement ."states I don't  swallow pills.". Patient requesting liquid supplement.  "

## 2017-08-02 NOTE — PLAN OF CARE
Problem: Occupational Therapy Goal  Goal: Occupational Therapy Goal  is currently performing ADLs, functional mobility & t/fs without assistance and displays age-appropriate strength, endurance & balance. OT services are not recommended at this time and patient is safe to D/C home.    D/C acute OT services     Comments: Jackson Ma OTR/MANUEL  8/2/2017

## 2017-08-02 NOTE — ED PROVIDER NOTES
Encounter Date: 2017       History     Chief Complaint   Patient presents with    Sickle Cell Pain Crisis     Pt was seen in [place] at [facility] today and was d/c. Pt states that she is still in a lot of pain and that it hurts all over.      Pt is a 20yo F with history of Sick cell disease and history of sickle cell crisis (, left eye was infarcted) here for right arm and chest pain. The pt presented to [facility] ED on 17 complaining of right sided weakness (RUE>RLE) and pain since 17. She was discharged and soon after presented to our ED with continued right arm and chest pain. She states she did not have time to fill her prescriptions and came straight here. The pain is severe 10/10 and shooting down her right arm. She is unable to move the arm due to pain. At [facility] ED, there was concern for weakness and possible stroke. MRI showed no acute stroke.Typically has 2-3 SC crises per year. No fevers, chills, SOB, difficulty breathing.          Review of patient's allergies indicates:  No Known Allergies  Past Medical History:   Diagnosis Date    Active labor 2014    Encounter for blood transfusion     Sickle cell anemia     STD (female)     Chlamydia/trichomonas     Stroke      Past Surgical History:   Procedure Laterality Date     SECTION  14    EYE SURGERY      left eye     Family History   Problem Relation Age of Onset    Breast cancer Neg Hx     Colon cancer Neg Hx     Ovarian cancer Neg Hx      Social History   Substance Use Topics    Smoking status: Never Smoker    Smokeless tobacco: Never Used    Alcohol use No     Review of Systems   Constitutional: Negative for chills, diaphoresis and fever.   Respiratory: Negative for cough, shortness of breath and wheezing.         Sharp chest pain, pleuritic   Cardiovascular: Positive for chest pain. Negative for palpitations and leg swelling.   Gastrointestinal: Negative for abdominal distention, abdominal pain and vomiting.    Genitourinary: Negative for difficulty urinating and dysuria.   Musculoskeletal: Positive for arthralgias. Negative for joint swelling.   Skin: Negative for rash and wound.   Neurological: Negative for dizziness, syncope, light-headedness, numbness and headaches.        Rt sided weakness on previous ED visit. Currently, the pt complains of weakness due to pain in right arm. No numbness or tingling. Sensation in right arm in tact.   Psychiatric/Behavioral: Negative for agitation and confusion.       Physical Exam     Initial Vitals [08/01/17 2003]   BP Pulse Resp Temp SpO2   (!) 155/90 79 20 99.1 °F (37.3 °C) 99 %      MAP       111.67         Physical Exam    Constitutional: She appears well-developed and well-nourished.   HENT:   Head: Normocephalic and atraumatic.   Eyes: EOM are normal. Right eye exhibits no discharge. Left eye exhibits no discharge. No scleral icterus.   Neck: Normal range of motion. Neck supple.   Cardiovascular: Normal rate, regular rhythm and normal heart sounds. Exam reveals no gallop and no friction rub.    No murmur heard.  Pulmonary/Chest: Breath sounds normal. No respiratory distress. She has no wheezes. She has no rhonchi.   Abdominal: Soft. She exhibits no distension and no mass. There is no tenderness. There is no rebound and no guarding.   No organomegaly palpated.   Musculoskeletal: She exhibits tenderness. She exhibits no edema.   ROM in rt arm limited by pain. Tenderness proximal to right shoulder down to finger tips.   Neurological: She is alert and oriented to person, place, and time. No cranial nerve deficit or sensory deficit.   Skin: Skin is warm and dry.   Psychiatric: She has a normal mood and affect.         ED Course   Procedures  Labs Reviewed             Medical Decision Making:   Initial Assessment:   Pt is a 22yo F with history of Sick cell disease who presents with sickle cell crisis in right arm. Recently discharged from ED on 8/1 and represented due to ongoing  pain. Vitals stable.   Differential Diagnosis:   Sickle cell crisis, acute chest (CXR normal, pain in chest resolving)  ED Management:  Hgb 9.8, plt 92 from [facility] ED taken 24 hr ago. Given her recent discharge and immediate return to ED, pt will be admitted for pain control and monitoring of SCC. First dose of 1mg dilaudid did not alleviate the pain and pt still complaining of inability to move right arm due to pain. Second dose of 1mg dilaudid given.    Frankie Rizvi MD  Resident, PGY-1  3:10 AM    Pt continues to complain of pain in rt arm. On repeat exam, there is no weakness in left or right . Pt having difficulty bringing arm straight due to pain. Inpatient medicine team consulted given the inability to manage this SCC pain as an outpatient. Pt agrees.    Frankie Rizvi MD  Resident, PGY-1  4:01 AM              Attending Attestation:   Physician Attestation Statement for Resident:  As the supervising MD   Physician Attestation Statement: I have personally seen and examined this patient.   I agree with the above history. -: 20 yo f, h/o SC disease, here with pain crisis, just dc'd previous day.  Pt reports pain c/w pain crisis.  Received dilaudid 1 mg x 2.  After both doses, pt somnolent, needing to be aroused, but then would report that she was in severe pain.  Admitted to medicine for further management   As the supervising MD I agree with the above PE.    As the supervising MD I agree with the above treatment, course, plan, and disposition.  I have reviewed and agree with the residents interpretation of the following: lab data, x-rays and EKG.  I have reviewed the following: old records at this facility.                    ED Course     Clinical Impression:   The primary encounter diagnosis was Sickle cell crisis. Diagnoses of Chest pain and Hemoglobin S-C disease, with unspecified crisis were also pertinent to this visit.                           Fadumo Núñez MD  08/03/17 4258

## 2017-08-02 NOTE — PHARMACY MED REC
"Admission Medication Reconciliation - Pharmacy Consult Note    The home medication history was taken by Fadumo Gallardo Pharmacy Tech.  Based on information gathered and subsequent review by the clinical pharmacist, the items below may need attention.    You may go to "Admission" then "Reconcile Home Medications" tabs to review and/or act upon these items.      No issues noted with the medication reconciliation.    Katerine Huber, PharmD  [phone]      Patient's prior to admission medication regimen was as follows:  Prescriptions Prior to Admission   Medication Sig Dispense Refill Last Dose    hydrocodone-acetaminophen 5-325mg (NORCO) 5-325 mg per tablet Take 1 tablet by mouth every 6 (six) hours as needed for Pain. 12 tablet 0 Unknown    hydroxyurea (HYDREA) 500 mg Cap Take 1 capsule (500 mg total) by mouth once daily. 90 capsule 3 More than a month at Unknown time         Please add appropriate    SmartPhrase below:        "

## 2017-08-02 NOTE — PLAN OF CARE
Problem: Patient Care Overview  Goal: Plan of Care Review  Outcome: Ongoing (interventions implemented as appropriate)  Patient c/o pain 10 out 10 generalized radiating to the right arm; moderate relief with pain medication

## 2017-08-02 NOTE — ED TRIAGE NOTES
"Murali Tarango, a 21 y.o. female presents to the ED with c/o "pain all over" including chest pain and SOB. Pt also c/o severe right arm pain. Pt reports being seen and discharged from [facility] in [place] today with no relief.      Chief Complaint   Patient presents with    Sickle Cell Pain Crisis     Pt was seen in [place] at [facility] today and was d/c. Pt states that she is still in a lot of pain and that it hurts all over.      Review of patient's allergies indicates:  No Known Allergies  Past Medical History:   Diagnosis Date    Active labor 11/24/2014    Encounter for blood transfusion     Sickle cell anemia     STD (female)     Chlamydia/trichomonas     Stroke        "

## 2017-08-02 NOTE — SUBJECTIVE & OBJECTIVE
Past Medical History:   Diagnosis Date    Active labor 2014    Encounter for blood transfusion     Sickle cell anemia     STD (female)     Chlamydia/trichomonas     Stroke        Past Surgical History:   Procedure Laterality Date     SECTION  14    EYE SURGERY      left eye       Review of patient's allergies indicates:  No Known Allergies    Current Facility-Administered Medications on File Prior to Encounter   Medication    [COMPLETED] magnesium sulfate 2g in water 50mL IVPB (premix)    [COMPLETED] potassium chloride SA CR tablet 40 mEq    [DISCONTINUED] 0.9%  NaCl infusion    [DISCONTINUED] acetaminophen tablet 650 mg    [DISCONTINUED] aspirin chewable tablet 81 mg    [DISCONTINUED] aspirin tablet 325 mg    [DISCONTINUED] dextrose 50% injection 12.5 g    [DISCONTINUED] dextrose 50% injection 25 g    [DISCONTINUED] diphenhydrAMINE injection 25 mg    [DISCONTINUED] ferrous sulfate tablet 325 mg    [DISCONTINUED] folic acid tablet 1 mg    [DISCONTINUED] glucagon (human recombinant) injection 1 mg    [DISCONTINUED] glucose chewable tablet 16 g    [DISCONTINUED] glucose chewable tablet 24 g    [DISCONTINUED] hydroxyurea capsule 500 mg    [DISCONTINUED] morphine injection 2 mg    [DISCONTINUED] ondansetron injection 4 mg    [DISCONTINUED] oxycodone-acetaminophen  mg per tablet 1 tablet    [DISCONTINUED] polyethylene glycol packet 17 g    [DISCONTINUED] promethazine (PHENERGAN) 12.5 mg in dextrose 5 % 50 mL IVPB    [DISCONTINUED] senna-docusate 8.6-50 mg per tablet 1 tablet     Current Outpatient Prescriptions on File Prior to Encounter   Medication Sig    aspirin 81 MG Chew Take 1 tablet (81 mg total) by mouth once daily.    ferrous sulfate 325 mg (65 mg iron) Tab tablet Take 1 tablet (325 mg total) by mouth 2 (two) times daily.    folic acid (FOLVITE) 1 MG tablet Take 1 mg by mouth once daily.    hydrocodone-acetaminophen 5-325mg (NORCO) 5-325 mg per tablet  Take 1 tablet by mouth every 6 (six) hours as needed for Pain.    hydroxyurea (HYDREA) 500 mg Cap Take 1 capsule (500 mg total) by mouth once daily.    ondansetron (ZOFRAN) 4 MG tablet Take 1 tablet (4 mg total) by mouth every 6 (six) hours.     Family History     None        Social History Main Topics    Smoking status: Never Smoker    Smokeless tobacco: Never Used    Alcohol use No    Drug use: No    Sexual activity: Yes     Partners: Male     Birth control/ protection: None      Comment: single     Review of Systems   Constitutional: Positive for appetite change (decreased) and fatigue. Negative for fever and unexpected weight change.   HENT: Negative for congestion and trouble swallowing.    Respiratory: Negative for chest tightness and shortness of breath.    Cardiovascular: Negative for chest pain, palpitations and leg swelling.   Gastrointestinal: Negative for abdominal distention, abdominal pain, constipation, diarrhea, nausea and vomiting.   Musculoskeletal: Positive for arthralgias and back pain.   Skin: Negative for color change.   Allergic/Immunologic: Negative for immunocompromised state.   Neurological: Negative for dizziness and light-headedness.     Objective:     Vital Signs (Most Recent):  Temp: 99.1 °F (37.3 °C) (08/01/17 2003)  Pulse: 62 (08/02/17 0440)  Resp: 14 (08/02/17 0440)  BP: 138/71 (08/02/17 0440)  SpO2: 100 % (08/02/17 0515) Vital Signs (24h Range):  Temp:  [99.1 °F (37.3 °C)-99.8 °F (37.7 °C)] 99.1 °F (37.3 °C)  Pulse:  [57-79] 62  Resp:  [14-20] 14  SpO2:  [99 %-100 %] 100 %  BP: (138-155)/(71-94) 138/71     Weight: 59 kg (130 lb)  Body mass index is 21.63 kg/m².    Physical Exam   Constitutional: She is oriented to person, place, and time. She appears well-developed and well-nourished.   HENT:   Head: Normocephalic and atraumatic.   Eyes: Pupils are equal, round, and reactive to light.   Neck: No JVD present.   Cardiovascular: Normal rate, regular rhythm, normal heart sounds  and intact distal pulses.    No murmur heard.  Pulmonary/Chest: Effort normal and breath sounds normal. No respiratory distress. She has no wheezes.   Abdominal: Soft. Bowel sounds are normal. She exhibits no distension. There is no tenderness.   Musculoskeletal: She exhibits no edema.   Pt did not allow me to assess ROM. Complained of 10/10 pain in her right arm   Neurological: She is alert and oriented to person, place, and time.   Skin: Skin is warm and dry. Capillary refill takes less than 2 seconds.     Significant Labs:   CBC:   Recent Labs  Lab 07/31/17  0529 07/31/17  1547 08/01/17  0512   WBC 4.64 4.97 4.57   HGB 9.2* 9.8* 9.8*   HCT 24.8* 26.4* 26.9*   PLT 79* 77* 92*     CMP:   Recent Labs  Lab 07/31/17  0529 08/01/17  0512    137   K 3.4* 3.3*    103   CO2 24 27    86   BUN <5* 5*   CREATININE 0.6 0.5   CALCIUM 8.3* 8.7   PROT 6.3 6.7   ALBUMIN 3.6 3.7   BILITOT 1.3* 1.5*   ALKPHOS 44* 48*   AST 13 15   ALT 9* 8*   ANIONGAP 7* 7*   EGFRNONAA >60.0 >60.0     Significant Imaging: I have reviewed and interpreted all pertinent imaging results/findings within the past 24 hours.

## 2017-08-03 VITALS
OXYGEN SATURATION: 97 % | TEMPERATURE: 99 F | WEIGHT: 132.25 LBS | HEART RATE: 85 BPM | SYSTOLIC BLOOD PRESSURE: 119 MMHG | RESPIRATION RATE: 18 BRPM | BODY MASS INDEX: 22.03 KG/M2 | HEIGHT: 65 IN | DIASTOLIC BLOOD PRESSURE: 65 MMHG

## 2017-08-03 LAB
ANION GAP SERPL CALC-SCNC: 7 MMOL/L
BASOPHILS # BLD AUTO: 0.01 K/UL
BASOPHILS NFR BLD: 0.2 %
BUN SERPL-MCNC: 5 MG/DL
CALCIUM SERPL-MCNC: 8.4 MG/DL
CHLORIDE SERPL-SCNC: 107 MMOL/L
CO2 SERPL-SCNC: 25 MMOL/L
CREAT SERPL-MCNC: 0.7 MG/DL
DIFFERENTIAL METHOD: ABNORMAL
EOSINOPHIL # BLD AUTO: 0.1 K/UL
EOSINOPHIL NFR BLD: 1 %
ERYTHROCYTE [DISTWIDTH] IN BLOOD BY AUTOMATED COUNT: 14.1 %
EST. GFR  (AFRICAN AMERICAN): >60 ML/MIN/1.73 M^2
EST. GFR  (NON AFRICAN AMERICAN): >60 ML/MIN/1.73 M^2
GLUCOSE SERPL-MCNC: 98 MG/DL
HCT VFR BLD AUTO: 25.2 %
HGB BLD-MCNC: 8.9 G/DL
LYMPHOCYTES # BLD AUTO: 0.7 K/UL
LYMPHOCYTES NFR BLD: 14.9 %
MAGNESIUM SERPL-MCNC: 1.7 MG/DL
MCH RBC QN AUTO: 29.9 PG
MCHC RBC AUTO-ENTMCNC: 35.3 G/DL
MCV RBC AUTO: 85 FL
MONOCYTES # BLD AUTO: 0.3 K/UL
MONOCYTES NFR BLD: 6.3 %
NEUTROPHILS # BLD AUTO: 3.7 K/UL
NEUTROPHILS NFR BLD: 77.4 %
PLATELET # BLD AUTO: 99 K/UL
PMV BLD AUTO: 12.6 FL
POTASSIUM SERPL-SCNC: 4 MMOL/L
RBC # BLD AUTO: 2.98 M/UL
SODIUM SERPL-SCNC: 139 MMOL/L
TSH SERPL DL<=0.005 MIU/L-ACNC: 0.46 UIU/ML
WBC # BLD AUTO: 4.77 K/UL

## 2017-08-03 PROCEDURE — 36415 COLL VENOUS BLD VENIPUNCTURE: CPT

## 2017-08-03 PROCEDURE — 99238 HOSP IP/OBS DSCHRG MGMT 30/<: CPT | Mod: ,,, | Performed by: HOSPITALIST

## 2017-08-03 PROCEDURE — 63600175 PHARM REV CODE 636 W HCPCS: Performed by: STUDENT IN AN ORGANIZED HEALTH CARE EDUCATION/TRAINING PROGRAM

## 2017-08-03 PROCEDURE — 25000003 PHARM REV CODE 250: Performed by: STUDENT IN AN ORGANIZED HEALTH CARE EDUCATION/TRAINING PROGRAM

## 2017-08-03 PROCEDURE — 85025 COMPLETE CBC W/AUTO DIFF WBC: CPT

## 2017-08-03 PROCEDURE — 83735 ASSAY OF MAGNESIUM: CPT

## 2017-08-03 PROCEDURE — 84443 ASSAY THYROID STIM HORMONE: CPT

## 2017-08-03 PROCEDURE — 80048 BASIC METABOLIC PNL TOTAL CA: CPT

## 2017-08-03 PROCEDURE — 25000003 PHARM REV CODE 250: Performed by: HOSPITALIST

## 2017-08-03 RX ORDER — FOLIC ACID 1 MG/1
1 TABLET ORAL DAILY
Qty: 30 TABLET | Refills: 2 | Status: SHIPPED | OUTPATIENT
Start: 2017-08-03 | End: 2019-01-16 | Stop reason: SDUPTHER

## 2017-08-03 RX ORDER — NAPROXEN SODIUM 220 MG/1
81 TABLET, FILM COATED ORAL DAILY
Refills: 0 | COMMUNITY
Start: 2017-08-03 | End: 2020-02-26

## 2017-08-03 RX ORDER — FERROUS SULFATE 325(65) MG
325 TABLET ORAL 2 TIMES DAILY
Qty: 30 TABLET | Refills: 0 | COMMUNITY
Start: 2017-08-03 | End: 2019-06-23

## 2017-08-03 RX ORDER — ONDANSETRON 2 MG/ML
4 INJECTION INTRAMUSCULAR; INTRAVENOUS ONCE
Status: COMPLETED | OUTPATIENT
Start: 2017-08-03 | End: 2017-08-03

## 2017-08-03 RX ORDER — HYDROXYUREA 500 MG/1
1000 CAPSULE ORAL DAILY
Qty: 60 CAPSULE | Refills: 2 | Status: SHIPPED | OUTPATIENT
Start: 2017-08-03 | End: 2017-11-01

## 2017-08-03 RX ADMIN — SODIUM CHLORIDE: 0.9 INJECTION, SOLUTION INTRAVENOUS at 03:08

## 2017-08-03 RX ADMIN — OXYCODONE HYDROCHLORIDE 15 MG: 5 TABLET ORAL at 02:08

## 2017-08-03 RX ADMIN — ASPIRIN 81 MG CHEWABLE TABLET 81 MG: 81 TABLET CHEWABLE at 08:08

## 2017-08-03 RX ADMIN — FOLIC ACID 1 MG: 1 TABLET ORAL at 08:08

## 2017-08-03 RX ADMIN — OXYCODONE HYDROCHLORIDE 15 MG: 5 TABLET ORAL at 08:08

## 2017-08-03 RX ADMIN — HYDROMORPHONE HYDROCHLORIDE 2 MG: 2 INJECTION INTRAMUSCULAR; INTRAVENOUS; SUBCUTANEOUS at 02:08

## 2017-08-03 RX ADMIN — ONDANSETRON 4 MG: 2 INJECTION INTRAMUSCULAR; INTRAVENOUS at 01:08

## 2017-08-03 RX ADMIN — HYDROMORPHONE HYDROCHLORIDE 2 MG: 2 INJECTION INTRAMUSCULAR; INTRAVENOUS; SUBCUTANEOUS at 05:08

## 2017-08-03 RX ADMIN — HYDROXYUREA 1000 MG: 500 CAPSULE ORAL at 08:08

## 2017-08-03 RX ADMIN — FERROUS SULFATE TAB EC 325 MG (65 MG FE EQUIVALENT) 325 MG: 325 (65 FE) TABLET DELAYED RESPONSE at 08:08

## 2017-08-03 NOTE — PT/OT/SLP PROGRESS
Physical Therapy Screen      Murali Tarango  MRN: [id number]    PT eval and treat orders received and acknowledged.  Upon PT attempt, pt found standing (I)'ly in room and walking around with no gait instability noted or balance deficits. Pt reports she has plenty of assist available upon d/c and no concerns with going home.  At this time, Ms. Tarango does not demonstrate any need for Acute PT intervention. She is at baseline with good support available. Will d/c orders. Recommend pt d/c home with assist from friends/family. Thank you for the referral. No units billed.     Fadumo Thomas, PT   08/03/2017

## 2017-08-03 NOTE — MEDICAL/APP STUDENT
Progress Note  Hospital Medicine    Patient Name: Murali Tarango  YOB: 1995    Admit Date: 8/1/2017 (Hospital Day: 3)    SUBJECTIVE:     Reason for Admission:  Sickle cell crisis  See H&P for detailed presentating history and ROS.      Hospital Course: Patient presented c/o 10/10 right shoulder/arm pain and chest pain from mid-chest to the right shoulder. Had been experiencing pain since Friday and went to [facility] ED along with [facility] ED before presenting to [facility]. Chest pain resolved once in the hospital. Patient continued to c/o right shoulder pain and weakness.    Interval history: Moving right arm much better than on presentation. Still complains of pain/weakness in the right shoulder, but says it is more stiffness/muscle tightness likely due to positioning the past few days. Eager to work with PT to improve ROM and hopeful to return home soon. Says pain has been well-managed.     OBJECTIVE:     Vital Signs Range (Last 24H):  Temp:  [96.7 °F (35.9 °C)-99 °F (37.2 °C)]   Pulse:  [55-66]   Resp:  [16-18]   BP: (111-125)/(56-70)   SpO2:  [96 %-100 %] Body mass index is 22.01 kg/m².  Wt Readings from Last 1 Encounters:   08/03/17 0400 60 kg (132 lb 4.4 oz)   08/02/17 0623 59 kg (130 lb)   08/01/17 2003 59 kg (130 lb)       I & O (Last 24H):  Intake/Output Summary (Last 24 hours) at 08/03/17 0753  Last data filed at 08/03/17 0500   Gross per 24 hour   Intake          5079.33 ml   Output                0 ml   Net          5079.33 ml       Physical Exam:  General: well developed, well nourished, no distress  Musculoskeletal:right biceps tightness; Right upper arm strength 4+ and right forearm strength 4 compared to Left upper arm and forearm strength of 5    Diagnostic Results:  Lab Results   Component Value Date    WBC 4.77 08/03/2017    HGB 8.9 (L) 08/03/2017    HCT 25.2 (L) 08/03/2017    MCV 85 08/03/2017    PLT 99 (L) 08/03/2017       Recent Labs  Lab 08/03/17  0411   GLU 98      K 4.0       CO2 25   BUN 5*   CREATININE 0.7   CALCIUM 8.4*   MG 1.7     Lab Results   Component Value Date    INR 1.0 07/29/2017    INR 1.0 11/19/2014     No results found for: HGBA1C  No results for input(s): POCTGLUCOSE in the last 72 hours.    ASSESSMENT/PLAN:     Active Hospital Problems    Diagnosis  POA    *Sickle cell crisis [D57.00]  Yes      Resolved Hospital Problems    Diagnosis Date Resolved POA   No resolved problems to display.       Problems Addressed Today:    1.) Sickle Cell Crisis  - Patient prescribed hydroxyurea; explain need of taking hydroxyurea for long-term management of disease  -Pain well-controlled on IV dilaudid; switch to PO pain meds to facilitate d/c home    2.) Right Shoulder Pain  -PO pain meds  -PT exercises to help reduce muscle tightness and increase ROM      Adan Tadeo, MS3  [employer]

## 2017-08-03 NOTE — PLAN OF CARE
Problem: Patient Care Overview  Goal: Plan of Care Review  Outcome: Ongoing (interventions implemented as appropriate)  Pt has call bell in reach, non slip socks on, and bedrails up x2. Pt notified to call  for assistance to bathroom. Pt working with PT/OT during the day. Pt has pain meds iv q3 hrs.

## 2017-08-03 NOTE — DISCHARGE SUMMARY
"[facility] Hospital Medicine  Discharge Summary      Patient Name: Murali Tarango  MRN: [id number]  Admission Date: 8/1/2017  Hospital Length of Stay: 1 days  Discharge Date and Time:  08/03/2017 2:06 PM  Attending Physician: Maykel Zendejas MD   Discharging Provider: Loco Waldron MD  Primary Care Provider: Esteban Powell MD    Hospital Medicine Team: [facility] HOSP MED 1 Loco Waldron MD    HPI: Pt is a 22 y/o F with history of sickle cell disease (Hgb SC), h/o CVA (w/monoocular weakness in 2014), who presents for right arm pain.      Pt reports pain crises started last Friday - she initially presented to [facility] and they discharged her in a few hours. Her mom then picked her up and brought her to [place] ([facility]) who discharged after a day as well. She came back to [place] (this is her home) and was in "unbearable pain" so came to the [facility] ER.   Pt does not take hydroxyurea or folate tabs.      She reports that her pain is a  10/10 shooting pain starting at her shoulders and radiating down her right arms. She denies any pain in her right leg. She denies lack of ROM just complains of pain limiting movement.   In the ER, there was concern that she may have a stoke but MRI brain was negative.      Pt reports avg of 3 crises per year (early, mid and end of year) that is associated with temp change and weather change. Her episodes range from a few hours to a few days.   She denies any recent illnesses, fevers, chills, SOB. Her chest pain has resolved. She has never seen a sickle cell specialist. She is a mother of 2 children - 1 yrs and 2 yrs and had no complications during pregnancy.    Additional HPI information: 21 yr o woman w SCD admitted with pain in chest and pain and weakness in rt arm. She reports that she has had SSD from childbirth with 2-4 admissions every year for SSC recently. She was admitted to [facility] in 2012 w CVA that affected her left side and eye leading " to loss of vision in her left eye and replacement with a glass eye. Eventually the weakness on the left side improved (she is left handed). 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  She has received her care at various locations including  and [facility] recently and would like to establish all of her care here and would like to enroll in our SSD program.  She does not use opioids chronically (verified via  review).       * No surgery found *      Hospital Course: The patient was started on IV fluid resuscitation at 200ml/hr normal saline. Patient was also started on scheduled pain control, hydroxyurea, folate. Patient stated she has new onset right arm weakness, MRI without contrast and CT head were unremarkable. Patient refused MRI with contrast. PT/OT ordered for right arm weakness.     Significant Diagnostic Studies: Labs:   BMP:   Recent Labs  Lab 08/03/17  0411   GLU 98      K 4.0      CO2 25   BUN 5*   CREATININE 0.7   CALCIUM 8.4*   MG 1.7    and CMP   Recent Labs  Lab 08/03/17  0411      K 4.0      CO2 25   GLU 98   BUN 5*   CREATININE 0.7   CALCIUM 8.4*   ANIONGAP 7*   ESTGFRAFRICA >60.0   EGFRNONAA >60.0     Physical Exam   Constitutional: She is oriented to person, place, and time and well-developed, well-nourished, and in no distress. No distress.   HENT:   Head: Normocephalic and atraumatic.   Cardiovascular: Normal rate and normal heart sounds.    Pulmonary/Chest: Effort normal and breath sounds normal. No respiratory distress. She has no rales. She exhibits no tenderness.   Abdominal: Soft. Bowel sounds are normal. She exhibits no distension. There is no tenderness. There is no rebound.   Musculoskeletal: Normal range of motion.   ROM normal with right arm. Power on right arm is 4+/5 throughout.   Neurological: She is alert and oriented to person, place, and time.   Skin: Skin is warm and dry.   Psychiatric: Affect normal.       Pending Diagnostic Studies:      None        Final Active Diagnoses:    Diagnosis Date Noted POA    PRINCIPAL PROBLEM:  Sickle cell crisis [D57.00] 07/30/2017 Yes      Problems Resolved During this Admission:    Diagnosis Date Noted Date Resolved POA      Discharged Condition: good    Disposition: Home or Self Care    Follow Up: ambulatory referral to [facility]    Patient Instructions: Return to care should symptoms continue or worsen.    Ambulatory consult to Hematology / Oncology   Referral Priority: Routine Referral Type: Consultation   Referral Reason: Specialty Services Required    Requested Specialty: Hematology and Oncology    Number of Visits Requested: 1        Medications:  Reconciled Home Medications:   Current Discharge Medication List      START taking these medications    Details   folic acid (FOLVITE) 1 MG tablet Take 1 tablet (1 mg total) by mouth once daily.  Qty: 30 tablet, Refills: 2      hydroxyurea (HYDREA) 500 mg Cap Take 2 capsules (1,000 mg total) by mouth once daily.  Qty: 60 capsule, Refills: 2         CONTINUE these medications which have CHANGED    Details   aspirin 81 MG Chew Take 1 tablet (81 mg total) by mouth once daily.  Refills: 0      ferrous sulfate 325 mg (65 mg iron) Tab tablet Take 1 tablet (325 mg total) by mouth 2 (two) times daily.  Qty: 30 tablet, Refills: 0         CONTINUE these medications which have NOT CHANGED    Details   hydrocodone-acetaminophen 5-325mg (NORCO) 5-325 mg per tablet Take 1 tablet by mouth every 6 (six) hours as needed for Pain.  Qty: 12 tablet, Refills: 0           Time spent on the discharge of patient: 30 minutes         Loco Waldron MD  Department of Hospital Medicine  [facility]

## 2017-08-03 NOTE — NURSING
Patient educated on discharge instructions and follow up with hematology/oncology after discharge. Patient instructed on discharge medications and where to  medications. L arm INT discontinued and patient tolerated well, all IV apparatus intact.  All belongings packed and taken with patient. Patient ambulated independently to private vehicle with family.  ASHIA noted.

## 2018-09-24 ENCOUNTER — HOSPITAL ENCOUNTER (EMERGENCY)
Facility: HOSPITAL | Age: 23
Discharge: HOME | End: 2018-09-24
Attending: EMERGENCY MEDICINE
Payer: COMMERCIAL

## 2018-09-24 VITALS
HEIGHT: 65 IN | RESPIRATION RATE: 20 BRPM | OXYGEN SATURATION: 99 % | SYSTOLIC BLOOD PRESSURE: 116 MMHG | HEART RATE: 67 BPM | DIASTOLIC BLOOD PRESSURE: 82 MMHG | WEIGHT: 130 LBS | BODY MASS INDEX: 21.66 KG/M2 | TEMPERATURE: 99 F
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
## 2018-09-24 DIAGNOSIS — T14.8XXA SCRATCH: ICD-10-CM

## 2018-09-24 LAB
B-HCG UR QL: NEGATIVE
CTP QC/QA: YES

## 2018-09-24 PROCEDURE — 99284 EMERGENCY DEPT VISIT MOD MDM: CPT

## 2018-09-24 PROCEDURE — 81025 URINE PREGNANCY TEST: CPT | Performed by: PHYSICIAN ASSISTANT

## 2018-09-24 PROCEDURE — 25000003 PHARM REV CODE 250: Performed by: NURSE PRACTITIONER

## 2018-09-24 RX ORDER — AMOXICILLIN AND CLAVULANATE POTASSIUM 875; 125 MG/1; MG/1
1 TABLET, FILM COATED ORAL EVERY 12 HOURS
Qty: 20 TABLET | Refills: 0 | Status: SHIPPED | OUTPATIENT
Start: 2018-09-24 | End: 2018-10-04

## 2018-09-24 RX ORDER — OXYCODONE AND ACETAMINOPHEN 5; 325 MG/1; MG/1
1 TABLET ORAL
Status: COMPLETED | OUTPATIENT
Start: 2018-09-24 | End: 2018-09-24

## 2018-09-24 RX ORDER — NAPROXEN 500 MG/1
500 TABLET ORAL 2 TIMES DAILY PRN
Qty: 15 TABLET | Refills: 0 | Status: SHIPPED | OUTPATIENT
Start: 2018-09-24 | End: 2020-03-03

## 2018-09-24 RX ORDER — AMOXICILLIN AND CLAVULANATE POTASSIUM 875; 125 MG/1; MG/1
1 TABLET, FILM COATED ORAL
Status: COMPLETED | OUTPATIENT
Start: 2018-09-24 | End: 2018-09-24

## 2018-09-24 RX ORDER — MUPIROCIN 20 MG/G
OINTMENT TOPICAL 3 TIMES DAILY
Qty: 12 TUBE | Refills: 0 | Status: SHIPPED | OUTPATIENT
Start: 2018-09-24 | End: 2020-03-03

## 2018-09-24 RX ORDER — MUPIROCIN 20 MG/G
1 OINTMENT TOPICAL
Status: COMPLETED | OUTPATIENT
Start: 2018-09-24 | End: 2018-09-24

## 2018-09-24 RX ADMIN — OXYCODONE AND ACETAMINOPHEN 1 TABLET: 5; 325 TABLET ORAL at 10:09

## 2018-09-24 RX ADMIN — AMOXICILLIN AND CLAVULANATE POTASSIUM 1 TABLET: 875; 125 TABLET, FILM COATED ORAL at 10:09

## 2018-09-24 RX ADMIN — MUPIROCIN 22 G: 20 OINTMENT TOPICAL at 10:09
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
## 2018-09-25 NOTE — DISCHARGE INSTRUCTIONS
Take antibiotics twice daily as prescribed until they are gone.  You should not have any pills left over.    Follow-up with her primary physician for re-evaluation and further management.    Return to the emergency department for any new or worsening symptoms including signs of infection as discussed.
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
## 2018-10-16 ENCOUNTER — HOSPITAL ENCOUNTER (EMERGENCY)
Facility: HOSPITAL | Age: 23
Discharge: HOME | End: 2018-10-16
Attending: EMERGENCY MEDICINE
Payer: COMMERCIAL

## 2018-10-16 ENCOUNTER — OFFICE VISIT (OUTPATIENT)
Dept: URGENT CARE | Facility: CLINIC | Age: 23
End: 2018-10-16
Payer: COMMERCIAL

## 2018-10-16 VITALS
DIASTOLIC BLOOD PRESSURE: 63 MMHG | HEART RATE: 62 BPM | BODY MASS INDEX: 21.66 KG/M2 | RESPIRATION RATE: 16 BRPM | HEIGHT: 65 IN | SYSTOLIC BLOOD PRESSURE: 137 MMHG | TEMPERATURE: 98 F | OXYGEN SATURATION: 99 % | WEIGHT: 130 LBS

## 2018-10-16 VITALS
HEART RATE: 63 BPM | HEIGHT: 65 IN | BODY MASS INDEX: 21.66 KG/M2 | RESPIRATION RATE: 18 BRPM | DIASTOLIC BLOOD PRESSURE: 68 MMHG | TEMPERATURE: 100 F | SYSTOLIC BLOOD PRESSURE: 125 MMHG | OXYGEN SATURATION: 98 % | WEIGHT: 130 LBS

## 2018-10-16 DIAGNOSIS — D57.00 SICKLE CELL PAIN CRISIS: Primary | ICD-10-CM

## 2018-10-16 DIAGNOSIS — R11.2 NON-INTRACTABLE VOMITING WITH NAUSEA, UNSPECIFIED VOMITING TYPE: ICD-10-CM

## 2018-10-16 DIAGNOSIS — R19.7 DIARRHEA, UNSPECIFIED TYPE: ICD-10-CM

## 2018-10-16 LAB
ALBUMIN SERPL BCP-MCNC: 4.5 G/DL
ALP SERPL-CCNC: 54 U/L
ALT SERPL W/O P-5'-P-CCNC: 11 U/L
ANION GAP SERPL CALC-SCNC: 8 MMOL/L
APTT BLDCRRT: 28.1 SEC
AST SERPL-CCNC: 14 U/L
B-HCG UR QL: NEGATIVE
B-HCG UR QL: NEGATIVE
BASOPHILS # BLD AUTO: 0.01 K/UL
BASOPHILS NFR BLD: 0.2 %
BILIRUB SERPL-MCNC: 1.2 MG/DL
BILIRUB UR QL STRIP: NEGATIVE
BUN SERPL-MCNC: 7 MG/DL
CALCIUM SERPL-MCNC: 9.2 MG/DL
CHLORIDE SERPL-SCNC: 110 MMOL/L
CLARITY UR: CLEAR
CO2 SERPL-SCNC: 22 MMOL/L
COLOR UR: YELLOW
CREAT SERPL-MCNC: 0.8 MG/DL
CTP QC/QA: YES
DIFFERENTIAL METHOD: ABNORMAL
EOSINOPHIL # BLD AUTO: 0.1 K/UL
EOSINOPHIL NFR BLD: 0.9 %
ERYTHROCYTE [DISTWIDTH] IN BLOOD BY AUTOMATED COUNT: 14 %
EST. GFR  (AFRICAN AMERICAN): >60 ML/MIN/1.73 M^2
EST. GFR  (NON AFRICAN AMERICAN): >60 ML/MIN/1.73 M^2
GLUCOSE SERPL-MCNC: 89 MG/DL
GLUCOSE UR QL STRIP: NEGATIVE
HCT VFR BLD AUTO: 29.7 %
HGB BLD-MCNC: 10.8 G/DL
HGB UR QL STRIP: NEGATIVE
INR PPP: 1
KETONES UR QL STRIP: NEGATIVE
LEUKOCYTE ESTERASE UR QL STRIP: NEGATIVE
LYMPHOCYTES # BLD AUTO: 1.6 K/UL
LYMPHOCYTES NFR BLD: 30.4 %
MCH RBC QN AUTO: 30.6 PG
MCHC RBC AUTO-ENTMCNC: 36.4 G/DL
MCV RBC AUTO: 84 FL
MONOCYTES # BLD AUTO: 0.2 K/UL
MONOCYTES NFR BLD: 3.4 %
NEUTROPHILS # BLD AUTO: 3.5 K/UL
NEUTROPHILS NFR BLD: 65.3 %
NITRITE UR QL STRIP: NEGATIVE
PH UR STRIP: 5 [PH] (ref 5–8)
PLATELET # BLD AUTO: 103 K/UL
PLATELET BLD QL SMEAR: ABNORMAL
PMV BLD AUTO: ABNORMAL FL
POTASSIUM SERPL-SCNC: 3.7 MMOL/L
PROT SERPL-MCNC: 7.9 G/DL
PROT UR QL STRIP: NEGATIVE
PROTHROMBIN TIME: 10.9 SEC
RBC # BLD AUTO: 3.53 M/UL
RETICS/RBC NFR AUTO: 4.4 %
SODIUM SERPL-SCNC: 140 MMOL/L
SP GR UR STRIP: 1.01 (ref 1–1.03)
URN SPEC COLLECT METH UR: NORMAL
UROBILINOGEN UR STRIP-ACNC: NEGATIVE EU/DL
WBC # BLD AUTO: 5.36 K/UL

## 2018-10-16 PROCEDURE — 85730 THROMBOPLASTIN TIME PARTIAL: CPT

## 2018-10-16 PROCEDURE — 96372 THER/PROPH/DIAG INJ SC/IM: CPT | Mod: S$GLB,,, | Performed by: EMERGENCY MEDICINE

## 2018-10-16 PROCEDURE — 25000003 PHARM REV CODE 250: Performed by: PHYSICIAN ASSISTANT

## 2018-10-16 PROCEDURE — 80053 COMPREHEN METABOLIC PANEL: CPT

## 2018-10-16 PROCEDURE — 96361 HYDRATE IV INFUSION ADD-ON: CPT

## 2018-10-16 PROCEDURE — 96376 TX/PRO/DX INJ SAME DRUG ADON: CPT

## 2018-10-16 PROCEDURE — 3008F BODY MASS INDEX DOCD: CPT | Mod: CPTII,S$GLB,, | Performed by: NURSE PRACTITIONER

## 2018-10-16 PROCEDURE — 96375 TX/PRO/DX INJ NEW DRUG ADDON: CPT

## 2018-10-16 PROCEDURE — 99214 OFFICE O/P EST MOD 30 MIN: CPT | Mod: 25,S$GLB,, | Performed by: NURSE PRACTITIONER

## 2018-10-16 PROCEDURE — 81025 URINE PREGNANCY TEST: CPT | Performed by: EMERGENCY MEDICINE

## 2018-10-16 PROCEDURE — 85045 AUTOMATED RETICULOCYTE COUNT: CPT

## 2018-10-16 PROCEDURE — 25000003 PHARM REV CODE 250: Performed by: EMERGENCY MEDICINE

## 2018-10-16 PROCEDURE — 81025 URINE PREGNANCY TEST: CPT

## 2018-10-16 PROCEDURE — 63600175 PHARM REV CODE 636 W HCPCS: Performed by: EMERGENCY MEDICINE

## 2018-10-16 PROCEDURE — 85610 PROTHROMBIN TIME: CPT

## 2018-10-16 PROCEDURE — 99284 EMERGENCY DEPT VISIT MOD MDM: CPT | Mod: 25

## 2018-10-16 PROCEDURE — 85025 COMPLETE CBC W/AUTO DIFF WBC: CPT

## 2018-10-16 PROCEDURE — 96374 THER/PROPH/DIAG INJ IV PUSH: CPT

## 2018-10-16 PROCEDURE — 81003 URINALYSIS AUTO W/O SCOPE: CPT

## 2018-10-16 RX ORDER — HYDROMORPHONE HYDROCHLORIDE 2 MG/ML
1 INJECTION, SOLUTION INTRAMUSCULAR; INTRAVENOUS; SUBCUTANEOUS
Status: COMPLETED | OUTPATIENT
Start: 2018-10-16 | End: 2018-10-16

## 2018-10-16 RX ORDER — KETOROLAC TROMETHAMINE 30 MG/ML
30 INJECTION, SOLUTION INTRAMUSCULAR; INTRAVENOUS
Status: COMPLETED | OUTPATIENT
Start: 2018-10-16 | End: 2018-10-16

## 2018-10-16 RX ORDER — ONDANSETRON 8 MG/1
8 TABLET, ORALLY DISINTEGRATING ORAL
Status: COMPLETED | OUTPATIENT
Start: 2018-10-16 | End: 2018-10-16

## 2018-10-16 RX ORDER — ONDANSETRON 2 MG/ML
8 INJECTION INTRAMUSCULAR; INTRAVENOUS
Status: COMPLETED | OUTPATIENT
Start: 2018-10-16 | End: 2018-10-16

## 2018-10-16 RX ORDER — OXYCODONE AND ACETAMINOPHEN 5; 325 MG/1; MG/1
1 TABLET ORAL EVERY 4 HOURS PRN
Qty: 12 TABLET | Refills: 0 | Status: SHIPPED | OUTPATIENT
Start: 2018-10-16 | End: 2020-03-03

## 2018-10-16 RX ADMIN — HYDROMORPHONE HYDROCHLORIDE 1 MG: 2 INJECTION, SOLUTION INTRAMUSCULAR; INTRAVENOUS; SUBCUTANEOUS at 03:10

## 2018-10-16 RX ADMIN — ONDANSETRON 8 MG: 8 TABLET, ORALLY DISINTEGRATING ORAL at 11:10

## 2018-10-16 RX ADMIN — SODIUM CHLORIDE 1000 ML: 0.9 INJECTION, SOLUTION INTRAVENOUS at 01:10

## 2018-10-16 RX ADMIN — ONDANSETRON HYDROCHLORIDE 8 MG: 2 INJECTION INTRAMUSCULAR; INTRAVENOUS at 02:10

## 2018-10-16 RX ADMIN — KETOROLAC TROMETHAMINE 30 MG: 30 INJECTION, SOLUTION INTRAMUSCULAR; INTRAVENOUS at 11:10

## 2018-10-16 RX ADMIN — HYDROMORPHONE HYDROCHLORIDE 1 MG: 2 INJECTION, SOLUTION INTRAMUSCULAR; INTRAVENOUS; SUBCUTANEOUS at 02:10

## 2018-10-16 RX ADMIN — SODIUM CHLORIDE 1000 ML: 0.9 INJECTION, SOLUTION INTRAVENOUS at 02:10

## 2018-10-16 NOTE — DISCHARGE INSTRUCTIONS
Lots of liquids.  Percocet for pain. Please return immediately if you get worse or if new problems develop.  Please follow-up with the hematologist above this week.  Rest.

## 2018-10-16 NOTE — PROGRESS NOTES
"Subjective:       Patient ID: Murali Tarango is a 23 y.o. female.    Vitals:  height is 5' 5" (1.651 m) and weight is 59 kg (130 lb). Her temperature is 99.7 °F (37.6 °C). Her blood pressure is 125/68 and her pulse is 63. Her respiration is 18 and oxygen saturation is 98%.     Chief Complaint: Spasms    Patient presents to the clinic with complaints of pain for the last 2 days which has been getting progressively worse.  Also states she has been having some nausea and vomiting.  Patient has a history of sickle cell and states her last crisis was in February.  States she feels like she is getting ready to have 1 again.  Does not have any pain medication at home for when she has a crisis.  States that she is usually pretty well managed.      Spasms   This is a new problem. The current episode started yesterday. The problem occurs constantly. The problem has been gradually worsening. Associated symptoms include abdominal pain, arthralgias, chills, myalgias, nausea, neck pain, vomiting and weakness. Pertinent negatives include no chest pain, fever, headaches, rash or sore throat. The symptoms are aggravated by bending and exertion. She has tried nothing for the symptoms. The treatment provided no relief.     Review of Systems   Constitution: Positive for chills, decreased appetite, weakness and malaise/fatigue. Negative for fever.   HENT: Negative for sore throat.    Eyes: Negative for blurred vision.   Cardiovascular: Negative for chest pain.   Respiratory: Negative for shortness of breath.    Skin: Negative for color change and rash.   Musculoskeletal: Positive for arthralgias, back pain, muscle cramps, muscle weakness, myalgias, neck pain and stiffness. Negative for joint pain.   Gastrointestinal: Positive for abdominal pain, diarrhea, nausea and vomiting.   Neurological: Negative for headaches.   Psychiatric/Behavioral: The patient is not nervous/anxious.    All other systems reviewed and are negative.    "   Objective:      Physical Exam   Constitutional: She is oriented to person, place, and time. Vital signs are normal. She appears well-developed and well-nourished.  Non-toxic appearance. She does not have a sickly appearance. She appears ill. She appears distressed.   Patient noted to be shaking and hunched over on assessment.  Patient progressively getting worse while in the clinic.   HENT:   Head: Normocephalic and atraumatic.   Right Ear: External ear normal.   Left Ear: External ear normal.   Nose: Nose normal.   Mouth/Throat: Oropharynx is clear and moist.   Eyes: Conjunctivae and EOM are normal. Pupils are equal, round, and reactive to light.   Neck: Normal range of motion. Muscular tenderness present. No spinous process tenderness present. Neck rigidity present. Normal range of motion present.   Cardiovascular: Normal rate, regular rhythm, normal heart sounds and intact distal pulses. Exam reveals no friction rub.   No murmur heard.  Pulmonary/Chest: Effort normal and breath sounds normal.   Abdominal: Normal appearance and bowel sounds are normal. She exhibits no distension. There is generalized tenderness.   Musculoskeletal: She exhibits no edema or deformity.   Patient have a muscle contractions while in the room.   States that the left leg is the worst.  Unable to bend or have full range of motion of the leg secondary to pain.   Lymphadenopathy:     She has no cervical adenopathy.   Neurological: She is alert and oriented to person, place, and time.   Skin: Skin is warm. Capillary refill takes less than 2 seconds.   Psychiatric: Judgment and thought content normal.   Nursing note and vitals reviewed.      Assessment:       1. Sickle cell pain crisis    2. Non-intractable vomiting with nausea, unspecified vomiting type    3. Diarrhea, unspecified type        Plan:     [facility] called and update given.  Spoke with patient's father.  He is going to bring the patient there.  Patient wheeled  "to the car via wheelchair per TELLY Beckham.    Sickle cell pain crisis  -     Refer to Emergency Dept.    Non-intractable vomiting with nausea, unspecified vomiting type    Diarrhea, unspecified type    Other orders  -     ketorolac injection 30 mg; Inject 1 mL (30 mg total) into the muscle one time.  -     ondansetron disintegrating tablet 8 mg; Take 1 tablet (8 mg total) by mouth one time.      Patient Instructions       Sickle Cell Anemia  You have sickle cell anemia, which is also called sickle cell disease. That means your red blood cells may lose their normal round shape and become shaped like a half-moon. These cells can't carry oxygen as well as normal, round blood cells. Sickle cell anemia runs in families, and commonly affects -Americans and certain other ethnic groups. Sickle cell anemia is a genetic disease you get from a mutated (changed) gene passed down from each parent. Sickle cell "trait" happens when you get one mutated gene from one of your parents. Sickle cell trait usually does not have symptoms and is not serious. Neither the disease nor the trait can be passed from person to person by coughing or touching. Sickle cell anemia can be controlled, but not cured. Most newborns are now tested for sickle cell disease at birth.  A sickle cell crisis happens when many sickle cells stick together and pile up in the blood vessels. During a sickle cell crisis, you may have severe pain in the chest, stomach, arms, and legs. The crisis can last for hours, or even days, and can happen several times a year.  Home care  Tips for taking care of yourself at home include:  · Watch for sores (ulcers) on your legs. These are caused by poor blood flow and are a sign that the sickle cell anemia is not under control.  · If you snore or sometimes stop breathing during sleep, be sure to tell your healthcare provider.  · Get treatment for any other medical condition, such as diabetes. This is important to avoid " complications of sickle cell anemia.  · Get early prenatal care if you are pregnant or plan to get pregnant.  · If you plan to travel by air, go in pressurized aircraft only. Check with your healthcare provider about any needed safety steps if you must travel in a nonpressurized aircraft.  · Talk to your healthcare provider about what kind of pain medicine you should use.  · Drink plenty of water, especially during warm weather.  · Get treated for any infection (cold, flu, skin infection) as soon as it happens.  · Wear warm clothes in cold weather or in air-conditioned rooms.  Lifestyle changes  Suggestions for changes include:  · Limit alcohol intake to no more than one drink per day.  · Stop smoking. Go to a stop-smoking program to improve your chances of quitting.  · Exercise regularly but not to the point that you become extremely tired. Drink plenty of fluids when you exercise.  · Avoid very strenuous activities, such as rough contact sports.  · Don't swim in cold water.   Follow-up care  Make a follow-up appointment as directed by our staff. Regular follow-up visits are very important.  When to call your healthcare provider  Call your healthcare provider right away if you have any of the following:  · Swollen hands or feet  · Sudden paleness in the skin or nail beds  · Yellow color of the skin or eyes (jaundice)  · Fever or signs of infection  · Swelling in the belly  · Sudden tiredness with no interest in what is going on  · Erection that won't go away  · Trouble hearing or seeing  · Weakness on one side of the body  · Sudden change in speech  · Headache  · Trouble breathing  · Joint, stomach, chest, or muscle pain  · Limping   Date Last Reviewed: 2/21/2016  © 8041-8121 [employer]. [address] [place] [address]. All rights reserved. This information is not intended as a substitute for professional medical care. Always follow your healthcare professional's instructions.

## 2018-10-16 NOTE — ED PROVIDER NOTES
"Encounter Date: 10/16/2018       History     Chief Complaint   Patient presents with    Sickle Cell Pain Crisis     pt was seen at urgent care and sent to ED due to medication not being avilable there; pt reports pain is primarily in left leg, and feels like left leg is "locking up"; pain began x1 day ago, was given toradol and zofran at urgent care     HPI   This 43-year-old female presents to the emergency room with a history of sickle cell disease.  The patient has severe pain in the left lower extremity the lumbar back and her neck.  The patient feels that this is her typical pain crisis.  She had 2 episodes of vomiting today.  She did have so have some nausea.  She is otherwise well without other injuries or problems no history of trauma or fever the patient has no sore throat cough painful urination there is no shortness of breath.  Review of patient's allergies indicates:  No Known Allergies  Past Medical History:   Diagnosis Date    Active labor 2014    Encounter for blood transfusion     Sickle cell anemia     STD (female)     Chlamydia/trichomonas     Stroke      Past Surgical History:   Procedure Laterality Date     SECTION  14    DELIVERY-CEASAREAN SECTION N/A 2014    Performed by [facility] MD at [facility] OR    EYE SURGERY      left eye     Family History   Problem Relation Age of Onset    Breast cancer Neg Hx     Colon cancer Neg Hx     Ovarian cancer Neg Hx      Social History     Tobacco Use    Smoking status: Current Some Day Smoker     Types: Cigarettes    Smokeless tobacco: Never Used   Substance Use Topics    Alcohol use: No    Drug use: No     Review of Systems  The patient was questioned specifically with regard to the following.  General: Fever, chills, sweats. Neuro: Headache. Eyes: eye problems. ENT: Ear pain, sore throat. Cardiovascular: Chest pain. Respiratory: Cough, shortness of breath. Gastrointestinal: Abdominal pain, vomiting, diarrhea. " Genitourinary: Painful urination.  Musculoskeletal: Arm and leg problems. Skin: Rash.  The review of systems was negative except for the following:  Left lower extremity pain, generalized joint pain, low lumbar back pain, neck pain.  Physical Exam     Initial Vitals [10/16/18 1204]   BP Pulse Resp Temp SpO2   (!) 178/85 (!) 58 18 98.5 °F (36.9 °C) 100 %      MAP       --         Physical Exam  The patient was examined specifically for the following:   General:No significant distress, Good color, Warm and dry. Head and neck:Scalp atraumatic, Neck supple. Neurological:Appropriate conversation, Gross motor deficits. Eyes:Conjugate gaze, Clear corneas. ENT: No epistaxis. Cardiac: Regular rate and rhythm, Grossly normal heart tones. Pulmonary: Wheezing, Rales. Gastrointestinal: Abdominal tenderness, Abdominal distention. Musculoskeletal: Extremity deformity, Apparent pain with range of motion of the joints. Skin: Rash.   The findings on examination were normal except for the following:  The patient is splinting her left lower extremity.  There is minimal vague diffuse tenderness of the lumbar back.  The neck is nontender and supple.  Lungs are clear and free of wheezing rales rubs and rhonchi.  Heart tones are normal.  The patient has regular rate and rhythm.  ED Course   Procedures  Labs Reviewed   CBC W/ AUTO DIFFERENTIAL - Abnormal; Notable for the following components:       Result Value    RBC 3.53 (*)     Hemoglobin 10.8 (*)     Hematocrit 29.7 (*)     MCHC 36.4 (*)     Platelets 103 (*)     Mono # 0.2 (*)     Mono% 3.4 (*)     Platelet Estimate Decreased (*)     All other components within normal limits   COMPREHENSIVE METABOLIC PANEL - Abnormal; Notable for the following components:    CO2 22 (*)     Total Bilirubin 1.2 (*)     Alkaline Phosphatase 54 (*)     All other components within normal limits   RETICULOCYTES - Abnormal; Notable for the following components:    Retic 4.4 (*)     All other components within  normal limits   PROTIME-INR   APTT   URINALYSIS, REFLEX TO URINE CULTURE    Narrative:     Preferred Collection Type->Urine, Clean Catch   PREGNANCY TEST, URINE RAPID   POCT URINE PREGNANCY          Imaging Results    None       Medical decision making: This patient presents to the emergency room with pain in her left leg her neck and her back.  The patient has a history of sickle cell disease.  Patient was treated with hydromorphone fluids and oxygen in the emergency room and her pain improved in it is almost gone at 5:20 p.m..  I will discharge the patient on Percocet.  I will have her follow up with Hematology-Oncology.  The patient has no hematologist oncologist now.  I could find no evidence of infection.  The hemoglobin and hematocrit were stable. I will discharge the patient.                          Clinical Impression:   The encounter diagnosis was Sickle cell pain crisis.                             Frankie Quinones MD  10/16/18 [id number]

## 2018-10-16 NOTE — PATIENT INSTRUCTIONS
"  Sickle Cell Anemia  You have sickle cell anemia, which is also called sickle cell disease. That means your red blood cells may lose their normal round shape and become shaped like a half-moon. These cells can't carry oxygen as well as normal, round blood cells. Sickle cell anemia runs in families, and commonly affects -Americans and certain other ethnic groups. Sickle cell anemia is a genetic disease you get from a mutated (changed) gene passed down from each parent. Sickle cell "trait" happens when you get one mutated gene from one of your parents. Sickle cell trait usually does not have symptoms and is not serious. Neither the disease nor the trait can be passed from person to person by coughing or touching. Sickle cell anemia can be controlled, but not cured. Most newborns are now tested for sickle cell disease at birth.  A sickle cell crisis happens when many sickle cells stick together and pile up in the blood vessels. During a sickle cell crisis, you may have severe pain in the chest, stomach, arms, and legs. The crisis can last for hours, or even days, and can happen several times a year.  Home care  Tips for taking care of yourself at home include:  · Watch for sores (ulcers) on your legs. These are caused by poor blood flow and are a sign that the sickle cell anemia is not under control.  · If you snore or sometimes stop breathing during sleep, be sure to tell your healthcare provider.  · Get treatment for any other medical condition, such as diabetes. This is important to avoid complications of sickle cell anemia.  · Get early prenatal care if you are pregnant or plan to get pregnant.  · If you plan to travel by air, go in pressurized aircraft only. Check with your healthcare provider about any needed safety steps if you must travel in a nonpressurized aircraft.  · Talk to your healthcare provider about what kind of pain medicine you should use.  · Drink plenty of water, especially during warm " weather.  · Get treated for any infection (cold, flu, skin infection) as soon as it happens.  · Wear warm clothes in cold weather or in air-conditioned rooms.  Lifestyle changes  Suggestions for changes include:  · Limit alcohol intake to no more than one drink per day.  · Stop smoking. Go to a stop-smoking program to improve your chances of quitting.  · Exercise regularly but not to the point that you become extremely tired. Drink plenty of fluids when you exercise.  · Avoid very strenuous activities, such as rough contact sports.  · Don't swim in cold water.   Follow-up care  Make a follow-up appointment as directed by our staff. Regular follow-up visits are very important.  When to call your healthcare provider  Call your healthcare provider right away if you have any of the following:  · Swollen hands or feet  · Sudden paleness in the skin or nail beds  · Yellow color of the skin or eyes (jaundice)  · Fever or signs of infection  · Swelling in the belly  · Sudden tiredness with no interest in what is going on  · Erection that won't go away  · Trouble hearing or seeing  · Weakness on one side of the body  · Sudden change in speech  · Headache  · Trouble breathing  · Joint, stomach, chest, or muscle pain  · Limping   Date Last Reviewed: 2/21/2016  © 8427-8268 [employer]. [address] [place] [address], [place] [address]. All rights reserved. This information is not intended as a substitute for professional medical care. Always follow your healthcare professional's instructions.

## 2018-10-16 NOTE — ED NOTES
Pt to the RM from [facility] with c/o sickle cell pain. Pt placed in gown, cardiac monitor, continuous pulse ox and BP placed on pt.

## 2019-01-03 PROBLEM — N12 PYELONEPHRITIS: Status: ACTIVE | Noted: 2019-01-03

## 2019-01-03 PROBLEM — M87.052 AVASCULAR NECROSIS OF FEMUR HEAD, LEFT: Status: ACTIVE | Noted: 2019-01-03

## 2019-06-23 ENCOUNTER — HOSPITAL ENCOUNTER (EMERGENCY)
Facility: HOSPITAL | Age: 24
Discharge: HOME | End: 2019-06-23
Attending: EMERGENCY MEDICINE
Payer: COMMERCIAL

## 2019-06-23 VITALS
RESPIRATION RATE: 20 BRPM | BODY MASS INDEX: 22.33 KG/M2 | DIASTOLIC BLOOD PRESSURE: 82 MMHG | SYSTOLIC BLOOD PRESSURE: 130 MMHG | HEIGHT: 65 IN | TEMPERATURE: 64 F | WEIGHT: 134 LBS | OXYGEN SATURATION: 100 % | HEART RATE: 70 BPM

## 2019-06-23 DIAGNOSIS — D57.00 SICKLE CELL PAIN CRISIS: Primary | ICD-10-CM

## 2019-06-23 DIAGNOSIS — M79.606 LEG PAIN: ICD-10-CM

## 2019-06-23 LAB
ALBUMIN SERPL BCP-MCNC: 4.8 G/DL (ref 3.5–5.2)
ALP SERPL-CCNC: 57 U/L (ref 55–135)
ALT SERPL W/O P-5'-P-CCNC: 8 U/L (ref 10–44)
ANION GAP SERPL CALC-SCNC: 13 MMOL/L (ref 8–16)
AST SERPL-CCNC: 17 U/L (ref 10–40)
B-HCG UR QL: NEGATIVE
BASOPHILS # BLD AUTO: 0.02 K/UL (ref 0–0.2)
BASOPHILS NFR BLD: 0.2 % (ref 0–1.9)
BILIRUB SERPL-MCNC: 1.5 MG/DL (ref 0.1–1)
BILIRUB UR QL STRIP: NEGATIVE
BUN SERPL-MCNC: 15 MG/DL (ref 6–20)
CALCIUM SERPL-MCNC: 10.2 MG/DL (ref 8.7–10.5)
CHLORIDE SERPL-SCNC: 106 MMOL/L (ref 95–110)
CLARITY UR REFRACT.AUTO: CLEAR
CO2 SERPL-SCNC: 23 MMOL/L (ref 23–29)
COLOR UR AUTO: YELLOW
CREAT SERPL-MCNC: 0.9 MG/DL (ref 0.5–1.4)
CTP QC/QA: YES
DIFFERENTIAL METHOD: ABNORMAL
EOSINOPHIL # BLD AUTO: 0.1 K/UL (ref 0–0.5)
EOSINOPHIL NFR BLD: 0.6 % (ref 0–8)
ERYTHROCYTE [DISTWIDTH] IN BLOOD BY AUTOMATED COUNT: 13.9 % (ref 11.5–14.5)
EST. GFR  (AFRICAN AMERICAN): >60 ML/MIN/1.73 M^2
EST. GFR  (NON AFRICAN AMERICAN): >60 ML/MIN/1.73 M^2
GLUCOSE SERPL-MCNC: 116 MG/DL (ref 70–110)
GLUCOSE UR QL STRIP: NEGATIVE
HCT VFR BLD AUTO: 32.2 % (ref 37–48.5)
HGB BLD-MCNC: 12 G/DL (ref 12–16)
HGB UR QL STRIP: NEGATIVE
IMM GRANULOCYTES # BLD AUTO: 0.02 K/UL (ref 0–0.04)
IMM GRANULOCYTES NFR BLD AUTO: 0.2 % (ref 0–0.5)
KETONES UR QL STRIP: NEGATIVE
LEUKOCYTE ESTERASE UR QL STRIP: NEGATIVE
LYMPHOCYTES # BLD AUTO: 1.9 K/UL (ref 1–4.8)
LYMPHOCYTES NFR BLD: 23.4 % (ref 18–48)
MCH RBC QN AUTO: 30.3 PG (ref 27–31)
MCHC RBC AUTO-ENTMCNC: 37.3 G/DL (ref 32–36)
MCV RBC AUTO: 81 FL (ref 82–98)
MONOCYTES # BLD AUTO: 0.3 K/UL (ref 0.3–1)
MONOCYTES NFR BLD: 4.1 % (ref 4–15)
NEUTROPHILS # BLD AUTO: 5.8 K/UL (ref 1.8–7.7)
NEUTROPHILS NFR BLD: 71.5 % (ref 38–73)
NITRITE UR QL STRIP: NEGATIVE
NRBC BLD-RTO: 0 /100 WBC
PH UR STRIP: 8 [PH] (ref 5–8)
PLATELET # BLD AUTO: 135 K/UL (ref 150–350)
PMV BLD AUTO: ABNORMAL FL (ref 9.2–12.9)
POTASSIUM SERPL-SCNC: 3.7 MMOL/L (ref 3.5–5.1)
PROT SERPL-MCNC: 8.5 G/DL (ref 6–8.4)
PROT UR QL STRIP: NEGATIVE
RBC # BLD AUTO: 3.96 M/UL (ref 4–5.4)
RETICS/RBC NFR AUTO: 4.4 % (ref 0.5–2.5)
SODIUM SERPL-SCNC: 142 MMOL/L (ref 136–145)
SP GR UR STRIP: 1.01 (ref 1–1.03)
URN SPEC COLLECT METH UR: NORMAL
WBC # BLD AUTO: 8.07 K/UL (ref 3.9–12.7)

## 2019-06-23 PROCEDURE — 81025 URINE PREGNANCY TEST: CPT | Performed by: PHYSICIAN ASSISTANT

## 2019-06-23 PROCEDURE — 63600175 PHARM REV CODE 636 W HCPCS: Performed by: PHYSICIAN ASSISTANT

## 2019-06-23 PROCEDURE — 63600175 PHARM REV CODE 636 W HCPCS: Performed by: EMERGENCY MEDICINE

## 2019-06-23 PROCEDURE — 96375 TX/PRO/DX INJ NEW DRUG ADDON: CPT

## 2019-06-23 PROCEDURE — 99284 EMERGENCY DEPT VISIT MOD MDM: CPT | Mod: 25

## 2019-06-23 PROCEDURE — 96374 THER/PROPH/DIAG INJ IV PUSH: CPT

## 2019-06-23 PROCEDURE — 25000003 PHARM REV CODE 250: Performed by: PHYSICIAN ASSISTANT

## 2019-06-23 PROCEDURE — 96361 HYDRATE IV INFUSION ADD-ON: CPT

## 2019-06-23 PROCEDURE — 85045 AUTOMATED RETICULOCYTE COUNT: CPT

## 2019-06-23 PROCEDURE — 85025 COMPLETE CBC W/AUTO DIFF WBC: CPT

## 2019-06-23 PROCEDURE — 99285 PR EMERGENCY DEPT VISIT,LEVEL V: ICD-10-PCS | Mod: ,,, | Performed by: EMERGENCY MEDICINE

## 2019-06-23 PROCEDURE — 99285 EMERGENCY DEPT VISIT HI MDM: CPT | Mod: ,,, | Performed by: EMERGENCY MEDICINE

## 2019-06-23 PROCEDURE — 80053 COMPREHEN METABOLIC PANEL: CPT

## 2019-06-23 PROCEDURE — 81003 URINALYSIS AUTO W/O SCOPE: CPT

## 2019-06-23 RX ORDER — HYDROXYUREA 500 MG/1
500 CAPSULE ORAL 2 TIMES DAILY
Qty: 60 CAPSULE | Refills: 0 | Status: SHIPPED | OUTPATIENT
Start: 2019-06-23 | End: 2019-07-23

## 2019-06-23 RX ORDER — FOLIC ACID 1 MG/1
1 TABLET ORAL DAILY
Qty: 30 TABLET | Refills: 0 | Status: SHIPPED | OUTPATIENT
Start: 2019-06-23 | End: 2019-11-18 | Stop reason: SDUPTHER

## 2019-06-23 RX ORDER — MORPHINE SULFATE 2 MG/ML
6 INJECTION, SOLUTION INTRAMUSCULAR; INTRAVENOUS
Status: COMPLETED | OUTPATIENT
Start: 2019-06-23 | End: 2019-06-23

## 2019-06-23 RX ORDER — FERROUS SULFATE 325(65) MG
325 TABLET ORAL DAILY
Qty: 30 TABLET | Refills: 0 | Status: SHIPPED | OUTPATIENT
Start: 2019-06-23

## 2019-06-23 RX ORDER — ONDANSETRON 2 MG/ML
4 INJECTION INTRAMUSCULAR; INTRAVENOUS
Status: COMPLETED | OUTPATIENT
Start: 2019-06-23 | End: 2019-06-23

## 2019-06-23 RX ORDER — HYDROMORPHONE HYDROCHLORIDE 1 MG/ML
1 INJECTION, SOLUTION INTRAMUSCULAR; INTRAVENOUS; SUBCUTANEOUS
Status: COMPLETED | OUTPATIENT
Start: 2019-06-23 | End: 2019-06-23

## 2019-06-23 RX ADMIN — MORPHINE SULFATE 6 MG: 2 INJECTION, SOLUTION INTRAMUSCULAR; INTRAVENOUS at 08:06

## 2019-06-23 RX ADMIN — HYDROMORPHONE HYDROCHLORIDE 1 MG: 1 INJECTION, SOLUTION INTRAMUSCULAR; INTRAVENOUS; SUBCUTANEOUS at 09:06

## 2019-06-23 RX ADMIN — ONDANSETRON 4 MG: 2 INJECTION INTRAMUSCULAR; INTRAVENOUS at 08:06

## 2019-06-23 RX ADMIN — SODIUM CHLORIDE 1000 ML: 0.9 INJECTION, SOLUTION INTRAVENOUS at 08:06

## 2019-06-24 NOTE — ED PROVIDER NOTES
Encounter Date: 2019       History     Chief Complaint   Patient presents with    Sickle Cell Pain Crisis     Pt reports w/ c/o sickle cell crisis pain on L side, specifically L thigh x4 hrs. Pt took tylenol w/ no relief. Pt tearful in triage.      24 y/o AAF with history of sickle cell disease, CVA presents to the ED c/o sickle cell pain crisis that started approximately 4 hours ago. Pain is localized to the L leg - this is normal with her pain crises. She has been out of all her medications for the past week. She has an appt with her hematologist next week. She reports SOB secondary to pain, headache, lightheadedness, and nausea without vomiting. Denies any history of acute chest syndrome. Denies f/c, chest pain, abdominal pain, dysuria, diarrhea.     The history is provided by the patient.     Review of patient's allergies indicates:  No Known Allergies  Past Medical History:   Diagnosis Date    Active labor 2014    Encounter for blood transfusion     Sickle cell anemia     STD (female)     Chlamydia/trichomonas     Stroke      Past Surgical History:   Procedure Laterality Date     SECTION  14    DELIVERY-CEASAREAN SECTION N/A 2014    [facility]    EYE SURGERY      left eye     Family History   Problem Relation Age of Onset    Breast cancer Neg Hx     Colon cancer Neg Hx     Ovarian cancer Neg Hx      Social History     Tobacco Use    Smoking status: Current Some Day Smoker     Types: Cigarettes    Smokeless tobacco: Never Used   Substance Use Topics    Alcohol use: No    Drug use: No     Review of Systems   Constitutional: Negative for chills and fever.   HENT: Negative for congestion, rhinorrhea and sore throat.    Eyes: Negative for photophobia and visual disturbance.   Respiratory: Positive for shortness of breath (secondary to pain).    Cardiovascular: Negative for chest pain and leg swelling.   Gastrointestinal: Positive for  nausea. Negative for abdominal pain, constipation, diarrhea and vomiting.   Genitourinary: Negative for dysuria, hematuria and vaginal bleeding.   Musculoskeletal: Positive for myalgias.   Skin: Negative for rash and wound.   Neurological: Positive for light-headedness and headaches. Negative for numbness.   Psychiatric/Behavioral: Negative for confusion.       Physical Exam     Initial Vitals [06/23/19 2000]   BP Pulse Resp Temp SpO2   (!) 136/94 75 20 98.3 °F (36.8 °C) 100 %      MAP       --         Physical Exam    Nursing note and vitals reviewed.  Constitutional: She appears well-developed and well-nourished. She is not diaphoretic. She appears distressed (secondary to pain).   HENT:   Head: Normocephalic and atraumatic.   Neck: Normal range of motion. Neck supple.   Cardiovascular: Normal rate, regular rhythm and normal heart sounds. Exam reveals no gallop and no friction rub.    No murmur heard.  Pulmonary/Chest: Breath sounds normal. She has no wheezes. She has no rhonchi. She has no rales.   Abdominal: Soft. Bowel sounds are normal. There is no tenderness. There is no rebound and no guarding.   Musculoskeletal: She exhibits tenderness (L thigh).   L DP pulse 2+   Neurological: She is alert and oriented to person, place, and time.   Skin: Skin is warm and dry. No rash noted. No erythema.   Psychiatric: She has a normal mood and affect.         ED Course   Procedures  Labs Reviewed   CBC W/ AUTO DIFFERENTIAL - Abnormal; Notable for the following components:       Result Value    RBC 3.96 (*)     Hematocrit 32.2 (*)     Mean Corpuscular Volume 81 (*)     Mean Corpuscular Hemoglobin Conc 37.3 (*)     Platelets 135 (*)     All other components within normal limits   COMPREHENSIVE METABOLIC PANEL - Abnormal; Notable for the following components:    Glucose 116 (*)     Total Protein 8.5 (*)     Total Bilirubin 1.5 (*)     ALT 8 (*)     All other components within normal limits   RETICULOCYTES - Abnormal; Notable  for the following components:    Retic 4.4 (*)     All other components within normal limits   URINALYSIS, REFLEX TO URINE CULTURE    Narrative:     Preferred Collection Type->Urine, Clean Catch   POCT URINE PREGNANCY          Imaging Results          X-Ray Femur Ap/Lat Left (Final result)  Result time 06/23/19 21:28:04    Final result by Chrystal Melo MD (06/23/19 21:28:04)                 Impression:      Multiple bone infarcts.  No fracture or collapse.      Electronically signed by: Chrystal Melo  Date:    06/23/2019  Time:    21:28             Narrative:    EXAMINATION:  XR FEMUR 2 VIEW LEFT    CLINICAL HISTORY:  Pain in leg, unspecified patient has history of sickle cell anemia.    TECHNIQUE:  AP and lateral views of the left femur were performed.    COMPARISON:  CT 01/03/2019 left hip films 01/03/2019.    FINDINGS:  Four images with frontal lateral projection.    There is serpentine sclerotic irregularity of the of the femoral head.  There is a similar serpentine ill-defined calcified area of the distal femoral metaphysis in the posterior aspect of the distal left femoral metadiaphysis.  Findings are consistent with multifocal bone infarcts.  No fracture or collapse.  No periosteal reaction.  No focal soft tissue swelling.  Hip joint space and knee joint space appears preserved.  No knee effusion.  No soft tissue gas.                                 Medical Decision Making:   History:   Old Medical Records: I decided to obtain old medical records.  Clinical Tests:   Lab Tests: Ordered and Reviewed  Radiological Study: Ordered and Reviewed       APC / Resident Notes:   22 y/o AAF with history of sickle cell disease, CVA presents to the ED c/o sickle cell pain crisis that started approximately 4 hours ago. VSS. Distressed secondary to pain. L thigh tenderness. L pedal pulse 2+. Lungs clear. DDx includes but is not limited to sickle cell pain crisis, anemia. I do not suspect ischemic limb. Will obtain labs  and xray L femur.     UPT negative. UA with no infection. H/H stable. T bili mildly elevated. Reticulocyte count elevated.     Xray L femur with no fractures.    Given IVF, morphine, dilaudid, zofran in the ED. Symptoms improved. I do not feel that she needs any further labs or imaging at this time. Stable for discharge.     She was discharged without any new prescriptions.  She will follow up with her hematologist next week as scheduled.  All of the patient's questions were answered.  I reviewed the patient's chart, labs, and imaging and discussed the case with my supervising physician.            Attending Attestation:             Attending ED Notes:   Patient here for sickle cell crisis consistent with all previous.  Hemoglobin is 12.  Reticulocyte count is 4 work.  She has no signs or symptoms of acute chest.  She has been out of her medications at home.  She understands that we cannot fill chronic pain medication here.  She will follow up with Hematology.             Clinical Impression:       ICD-10-CM ICD-9-CM   1. Sickle cell pain crisis D57.00 282.62   2. Leg pain M79.606 729.5         Disposition:   Disposition: Discharged  Condition: Stable                        Abbie Lomeli PA-C  06/23/19 [id number]

## 2019-06-24 NOTE — ED NOTES
LOC: Pt is awake, alert, oriented x4. Affect is appropriate. Speech clear and appropriate.      Appearance: Pt resting comfortably in no acute distress. Pt clean and well groomed.     Skin: Skin warm and dry. Color consistent with ethnicity. Skin turgor normal. Mucus membranes moist. Skin intact. No breakdown or bruising noted.     Musculoskeletal: Pt moving upper and lower extremities without difficulty. Denies weakness.     Respiratory: Airway open and patent. Respirations spontaneous, even, easy, and unlabored. Pt has normal effort and rate. No accessory muscle use noted. Denies cough. Denies shortness of breath.     Cardiovascular: No peripheral edema noted. No complaints of chest pain. S1S2 present. Rate regular. Radial pulses 2+.     Abdominal: Abdomen soft and nontender. No distention noted. Denies n/v. Bowels sounds active x 4 quadrants.     Neurological: Eyes open spontaneously. Behavior appropriate to situation. Follows commands appropriately. Facial expression symmetrical. Purposeful motor response. Sensation intact.     Sickle cell pain to left side of body sudden onset.

## 2019-06-25 ENCOUNTER — HOSPITAL ENCOUNTER (EMERGENCY)
Facility: HOSPITAL | Age: 24
Discharge: HOME | End: 2019-06-25
Attending: EMERGENCY MEDICINE
Payer: COMMERCIAL

## 2019-06-25 VITALS
HEIGHT: 65 IN | BODY MASS INDEX: 22.33 KG/M2 | SYSTOLIC BLOOD PRESSURE: 111 MMHG | HEART RATE: 54 BPM | OXYGEN SATURATION: 100 % | DIASTOLIC BLOOD PRESSURE: 66 MMHG | RESPIRATION RATE: 16 BRPM | TEMPERATURE: 98 F | WEIGHT: 134 LBS

## 2019-06-25 DIAGNOSIS — D57.00 VASO-OCCLUSIVE SICKLE CELL CRISIS: Primary | ICD-10-CM

## 2019-06-25 DIAGNOSIS — Z76.0 MEDICATION REFILL: ICD-10-CM

## 2019-06-25 DIAGNOSIS — D57.00 SICKLE CELL PAIN CRISIS: ICD-10-CM

## 2019-06-25 LAB
BASOPHILS # BLD AUTO: 0.01 K/UL (ref 0–0.2)
BASOPHILS NFR BLD: 0.2 % (ref 0–1.9)
DIFFERENTIAL METHOD: ABNORMAL
EOSINOPHIL # BLD AUTO: 0 K/UL (ref 0–0.5)
EOSINOPHIL NFR BLD: 0.7 % (ref 0–8)
ERYTHROCYTE [DISTWIDTH] IN BLOOD BY AUTOMATED COUNT: 14 % (ref 11.5–14.5)
HCT VFR BLD AUTO: 28.4 % (ref 37–48.5)
HGB BLD-MCNC: 10.3 G/DL (ref 12–16)
IMM GRANULOCYTES # BLD AUTO: 0.03 K/UL (ref 0–0.04)
IMM GRANULOCYTES NFR BLD AUTO: 0.5 % (ref 0–0.5)
LYMPHOCYTES # BLD AUTO: 1.1 K/UL (ref 1–4.8)
LYMPHOCYTES NFR BLD: 19.3 % (ref 18–48)
MCH RBC QN AUTO: 31 PG (ref 27–31)
MCHC RBC AUTO-ENTMCNC: 36.3 G/DL (ref 32–36)
MCV RBC AUTO: 86 FL (ref 82–98)
MONOCYTES # BLD AUTO: 0.3 K/UL (ref 0.3–1)
MONOCYTES NFR BLD: 4.7 % (ref 4–15)
NEUTROPHILS # BLD AUTO: 4.1 K/UL (ref 1.8–7.7)
NEUTROPHILS NFR BLD: 74.6 % (ref 38–73)
NRBC BLD-RTO: 0 /100 WBC
PLATELET # BLD AUTO: 103 K/UL (ref 150–350)
PMV BLD AUTO: ABNORMAL FL (ref 9.2–12.9)
RBC # BLD AUTO: 3.32 M/UL (ref 4–5.4)
RETICS/RBC NFR AUTO: 4.8 % (ref 0.5–2.5)
WBC # BLD AUTO: 5.54 K/UL (ref 3.9–12.7)

## 2019-06-25 PROCEDURE — 96374 THER/PROPH/DIAG INJ IV PUSH: CPT

## 2019-06-25 PROCEDURE — 85025 COMPLETE CBC W/AUTO DIFF WBC: CPT

## 2019-06-25 PROCEDURE — 99284 EMERGENCY DEPT VISIT MOD MDM: CPT | Mod: 25

## 2019-06-25 PROCEDURE — 96372 THER/PROPH/DIAG INJ SC/IM: CPT | Mod: 59

## 2019-06-25 PROCEDURE — 63600175 PHARM REV CODE 636 W HCPCS: Performed by: STUDENT IN AN ORGANIZED HEALTH CARE EDUCATION/TRAINING PROGRAM

## 2019-06-25 PROCEDURE — 99285 EMERGENCY DEPT VISIT HI MDM: CPT | Mod: ,,, | Performed by: EMERGENCY MEDICINE

## 2019-06-25 PROCEDURE — 99285 PR EMERGENCY DEPT VISIT,LEVEL V: ICD-10-PCS | Mod: ,,, | Performed by: EMERGENCY MEDICINE

## 2019-06-25 PROCEDURE — 63600175 PHARM REV CODE 636 W HCPCS: Performed by: EMERGENCY MEDICINE

## 2019-06-25 PROCEDURE — 85045 AUTOMATED RETICULOCYTE COUNT: CPT

## 2019-06-25 RX ORDER — OXYCODONE AND ACETAMINOPHEN 10; 325 MG/1; MG/1
1 TABLET ORAL EVERY 6 HOURS PRN
Qty: 15 TABLET | Refills: 0 | Status: SHIPPED | OUTPATIENT
Start: 2019-06-25 | End: 2019-06-30

## 2019-06-25 RX ORDER — HYDROMORPHONE HYDROCHLORIDE 1 MG/ML
1 INJECTION, SOLUTION INTRAMUSCULAR; INTRAVENOUS; SUBCUTANEOUS ONCE
Status: COMPLETED | OUTPATIENT
Start: 2019-06-25 | End: 2019-06-25

## 2019-06-25 RX ORDER — ONDANSETRON 2 MG/ML
4 INJECTION INTRAMUSCULAR; INTRAVENOUS
Status: COMPLETED | OUTPATIENT
Start: 2019-06-25 | End: 2019-06-25

## 2019-06-25 RX ADMIN — ONDANSETRON 4 MG: 2 INJECTION INTRAMUSCULAR; INTRAVENOUS at 10:06

## 2019-06-25 RX ADMIN — HYDROMORPHONE HYDROCHLORIDE 1 MG: 1 INJECTION, SOLUTION INTRAMUSCULAR; INTRAVENOUS; SUBCUTANEOUS at 09:06

## 2019-06-25 NOTE — ED NOTES
"Patient arrives for evaluation of "sicle cell pain" concentrated mostly in her left leg at knee - patient states she is out of her dilaudid oral tablets and needs to get set up with a hematologist - patient moaning when staff in room - states she is unable to bear weight on left leg without extreme pain - no obvious distress noted - no shortness of breath noted - lungs clear and equal bilaterally   "

## 2019-06-25 NOTE — ED PROVIDER NOTES
Encounter Date: 2019       History     Chief Complaint   Patient presents with    Sickle Cell Pain Crisis     seen here few days ago     Ms. Tarango is a 24 yo  female with Sickle cell disease who comes to the ED presenting with pain in her R. Leg. Patient states symptoms started 2-days ago and is consistent with her sickle cell pain crisis. She states the pain is worse in her distal femur and right knee. She denies any fall, trauma , swelling or bruising. She was seen in the ED 2-days ago for pain in her Left leg which has now resolved. Patient states that she has been out of all her pain medications for over a week, since she moved from [facility] and is yet to establish care with a PCP or Hematologist. She denies fever, chills, SOB, chest pain, abdominal pain, nausea and vomiting. She complains of occasional headaches and dizziness. Patient reports difficulty with bearing weight on her R. Leg. She usually has a crisis every 4-6 months when she is on her medications but without it, she has more frequent pain crisis.        Review of patient's allergies indicates:  No Known Allergies  Past Medical History:   Diagnosis Date    Active labor 2014    Encounter for blood transfusion     Sickle cell anemia     STD (female)     Chlamydia/trichomonas     Stroke      Past Surgical History:   Procedure Laterality Date     SECTION  14    DELIVERY-CEASAREAN SECTION N/A 2014    Performed by Jennifer Allen MD at [facility] OR    EYE SURGERY      left eye     Family History   Problem Relation Age of Onset    Breast cancer Neg Hx     Colon cancer Neg Hx     Ovarian cancer Neg Hx      Social History     Tobacco Use    Smoking status: Current Some Day Smoker     Types: Cigarettes    Smokeless tobacco: Never Used   Substance Use Topics    Alcohol use: No    Drug use: No     Review of Systems   Constitutional: Negative for chills and fever.   HENT: Negative for  congestion and sore throat.    Respiratory: Negative for shortness of breath.    Cardiovascular: Negative for chest pain.   Gastrointestinal: Negative for nausea.   Genitourinary: Negative for dysuria.   Musculoskeletal: Positive for arthralgias and gait problem. Negative for back pain.   Skin: Negative for rash.   Neurological: Positive for dizziness and headaches. Negative for weakness.   Hematological: Does not bruise/bleed easily.       Physical Exam     Initial Vitals [06/25/19 0717]   BP Pulse Resp Temp SpO2   (!) 140/96 64 18 98.3 °F (36.8 °C) 99 %      MAP       --         Physical Exam    Constitutional: She appears well-developed and well-nourished. She is not diaphoretic. She appears distressed (2/2 pain).   HENT:   Head: Normocephalic and atraumatic.   Eyes: Conjunctivae are normal. No scleral icterus.   Neck: Normal range of motion.   Cardiovascular: Normal rate, regular rhythm, normal heart sounds and intact distal pulses.   No murmur heard.  Pulmonary/Chest: Breath sounds normal. No respiratory distress. She has no wheezes. She has no rales.   Abdominal: Soft. Bowel sounds are normal. She exhibits no distension. There is no tenderness.   Musculoskeletal: Normal range of motion. She exhibits tenderness (R.thigh). She exhibits no edema.   Neurological: She is alert and oriented to person, place, and time.   Skin: Skin is warm and dry. No rash noted. No erythema.   Psychiatric: She has a normal mood and affect. Thought content normal.         ED Course   Procedures  Labs Reviewed   CBC W/ AUTO DIFFERENTIAL - Abnormal; Notable for the following components:       Result Value    RBC 3.32 (*)     Hemoglobin 10.3 (*)     Hematocrit 28.4 (*)     Mean Corpuscular Hemoglobin Conc 36.3 (*)     Platelets 103 (*)     Gran% 74.6 (*)     All other components within normal limits   RETICULOCYTES - Abnormal; Notable for the following components:    Retic 4.8 (*)     All other components within normal limits           Imaging Results          X-Ray Femur Ap/Lat Left (Final result)  Result time 06/25/19 10:08:59    Final result by Claudette Conde MD (06/25/19 10:08:59)                 Impression:      I suspect bone infarcts in the left femoral head and distal femoral diaphysis and metaphysis in this 23-year-old woman with a history of sickle cell disease.      Electronically signed by: Claudette Conde MD  Date:    06/25/2019  Time:    10:08             Narrative:    EXAMINATION:  XR FEMUR 2 VIEW LEFT    CLINICAL HISTORY:  Hb-SS disease with crisis, unspecified    TECHNIQUE:  AP and lateral views of the left femur were performed.    COMPARISON:  06/23/2019.    FINDINGS:  Femoral head maintains rounded contour with preservation of the hip joint.  However as noted in the report for the study dated 06/23/2019, there is irregular sclerosis in the left femoral head concerning for infarct.  Sclerosis is also present in the distal femoral diaphysis and metaphysis, also consistent with infarcts.  Findings are similar to the recent study of 06/23/2019.  No fracture, collapse, or periosteal reaction identified.    Joint spaces appear preserved within the limits of the study.  No soft tissue swelling identified.                                 Medical Decision Making:   Initial Assessment:   24 yo female with sickle cell presents with pain in her R. Thigh. Patient appears uncomfortable 2/2 to pain. Vital signs are stable. She has difficulty ambulating and bearing weight on her leg.  Differential Diagnosis:   Sickle cell pain crisis  Acute Femoral Necrosis     Independently Interpreted Test(s):   I have ordered and independently interpreted X-rays - see prior notes.  Clinical Tests:   Lab Tests: Ordered and Reviewed  Radiological Study: Ordered and Reviewed  Medical Tests: Reviewed  ED Management:  8:35 AM  CBC, retics, Xray of femur ordered  Dilaudid 1mg IV ordered    Patients Xray with no signs of acute femoral head necrosis.  Hemoglobin and reticulocyte count are stable.  Arrangements made with case management to set up outpatient hematology follow up for patient, as she will need to establish care with a provider in [place] to manage her sickle cell disease. Patient reports improvement in pain following IV pain medication. Plan to discharge patient with a 5-day course of Percocet for pain control until she can establish care with a provider here in [place].  Other:   I have discussed this case with another health care provider.       <> Summary of the Discussion: Patient navigator to establish outside care              Attending Attestation:   Physician Attestation Statement for Resident:  As the supervising MD   Physician Attestation Statement: I have personally seen and examined this patient.   I agree with the above history. -:   As the supervising MD I agree with the above PE.    As the supervising MD I agree with the above treatment, course, plan, and disposition.            I have reviewed and concur with the resident's history, physical, assessment, and plan.  I have personally interviewed and examined the patient at bedside.  See below addendum for my evaluation and additional findings.    Briefly,  this is a 23 y.o.female with sickle cell disease presenting with right lower extremity pain. Recently seen in the ED for similar findings.  Symptoms started 2 days ago as patient is currently out of her home pain medications and lost to follow-up.  Denies any recent injuries, traumas or fall.  Do not suspect septic arthritis, joint for acute chest pain based on symptoms. Patient is not established with no [facility], she is a new move from different city.  Patient is able to bear weight, and her exam and findings are consistent with her usual crisis.  She is currently without home medication or maintenance medication.  Will supply short course of opiates for home use with plan to follow up and establish care  with a physician here in [place].  I discussed case with patient navigator, who will expedite follow-up and establishment of primary care. Patient agreeable to discharge plan. Strict ED precautions and return instructions discussed at length and patient verbalized understanding. All questions were answered and ample time was given for questions.      Complexity:  High risk level 5      Bart Ryder DO    Dept of Emergency Medicine   [facility]  Spectralink: [id number]               Clinical Impression:       ICD-10-CM ICD-9-CM   1. Vaso-occlusive sickle cell crisis D57.00 282.62   2. Sickle cell pain crisis D57.00 282.62   3. Medication refill Z76.0 V68.1         Disposition:   Disposition: Discharged  Condition: Stable                        Rose Boogie DO  Resident  06/27/19 1615       Bart Ryder DO  07/01/19 0200

## 2019-06-26 ENCOUNTER — TELEPHONE (OUTPATIENT)
Dept: EMERGENCY MEDICINE | Facility: HOSPITAL | Age: 24
End: 2019-06-26

## 2019-06-26 ENCOUNTER — TELEPHONE (OUTPATIENT)
Dept: HEMATOLOGY/ONCOLOGY | Facility: CLINIC | Age: 24
End: 2019-06-26

## 2019-06-26 NOTE — TELEPHONE ENCOUNTER
Left voice message for patient to schedule appointment from referral to Hem/Onc.  Letter sent to patient.  Rogelio PIERCE  [phone]

## 2019-06-27 ENCOUNTER — TELEPHONE (OUTPATIENT)
Dept: HEMATOLOGY/ONCOLOGY | Facility: CLINIC | Age: 24
End: 2019-06-27

## 2019-11-18 ENCOUNTER — OFFICE VISIT (OUTPATIENT)
Dept: OBSTETRICS AND GYNECOLOGY | Facility: CLINIC | Age: 24
End: 2019-11-18
Attending: OBSTETRICS & GYNECOLOGY
Payer: COMMERCIAL

## 2019-11-18 VITALS
BODY MASS INDEX: 24.14 KG/M2 | SYSTOLIC BLOOD PRESSURE: 120 MMHG | DIASTOLIC BLOOD PRESSURE: 70 MMHG | WEIGHT: 145.06 LBS

## 2019-11-18 DIAGNOSIS — Z34.90 PREGNANCY, UNSPECIFIED GESTATIONAL AGE: ICD-10-CM

## 2019-11-18 DIAGNOSIS — Z01.419 WELL WOMAN EXAM: Primary | ICD-10-CM

## 2019-11-18 DIAGNOSIS — N92.6 IRREGULAR MENSES: ICD-10-CM

## 2019-11-18 LAB
B-HCG UR QL: POSITIVE
CTP QC/QA: YES

## 2019-11-18 PROCEDURE — 81025 URINE PREGNANCY TEST: CPT | Mod: S$GLB,,, | Performed by: OBSTETRICS & GYNECOLOGY

## 2019-11-18 PROCEDURE — 99202 PR OFFICE/OUTPT VISIT, NEW, LEVL II, 15-29 MIN: ICD-10-PCS | Mod: 25,S$GLB,, | Performed by: OBSTETRICS & GYNECOLOGY

## 2019-11-18 PROCEDURE — 99999 PR PBB SHADOW E&M-EST. PATIENT-LVL III: ICD-10-PCS | Mod: PBBFAC,,, | Performed by: OBSTETRICS & GYNECOLOGY

## 2019-11-18 PROCEDURE — 99999 PR PBB SHADOW E&M-EST. PATIENT-LVL III: CPT | Mod: PBBFAC,,, | Performed by: OBSTETRICS & GYNECOLOGY

## 2019-11-18 PROCEDURE — 99385 PREV VISIT NEW AGE 18-39: CPT | Mod: 1D,GY,S$GLB, | Performed by: OBSTETRICS & GYNECOLOGY

## 2019-11-18 PROCEDURE — 88175 CYTOPATH C/V AUTO FLUID REDO: CPT

## 2019-11-18 PROCEDURE — 3008F PR BODY MASS INDEX (BMI) DOCUMENTED: ICD-10-PCS | Mod: CPTII,S$GLB,, | Performed by: OBSTETRICS & GYNECOLOGY

## 2019-11-18 PROCEDURE — 3008F BODY MASS INDEX DOCD: CPT | Mod: CPTII,S$GLB,, | Performed by: OBSTETRICS & GYNECOLOGY

## 2019-11-18 PROCEDURE — 87491 CHLMYD TRACH DNA AMP PROBE: CPT

## 2019-11-18 PROCEDURE — 81025 POCT URINE PREGNANCY: ICD-10-PCS | Mod: S$GLB,,, | Performed by: OBSTETRICS & GYNECOLOGY

## 2019-11-18 PROCEDURE — 99385 PR PREVENTIVE VISIT,NEW,18-39: ICD-10-PCS | Mod: 1D,GY,S$GLB, | Performed by: OBSTETRICS & GYNECOLOGY

## 2019-11-18 PROCEDURE — 99202 OFFICE O/P NEW SF 15 MIN: CPT | Mod: 25,S$GLB,, | Performed by: OBSTETRICS & GYNECOLOGY

## 2019-11-18 RX ORDER — FOLIC ACID 1 MG/1
1 TABLET ORAL DAILY
Qty: 90 TABLET | Refills: 3 | Status: SHIPPED | OUTPATIENT
Start: 2019-11-18 | End: 2020-01-03 | Stop reason: SDUPTHER

## 2019-11-18 NOTE — PROGRESS NOTES
CC: Well woman exam & problem    Murali CHOI Emelina is a 24 y.o. female  presents for above.  LMP: Patient's last menstrual period was 10/16/2019 (exact date)..  Last pap .    Past Medical History:   Diagnosis Date    Active labor 2014    Encounter for blood transfusion     Sickle cell anemia     STD (female)     Chlamydia/trichomonas     Stroke      Past Surgical History:   Procedure Laterality Date     SECTION  14    EYE SURGERY      left eye     Social History     Socioeconomic History    Marital status: Single     Spouse name: Not on file    Number of children: Not on file    Years of education: Not on file    Highest education level: Not on file   Occupational History    Not on file   Social Needs    Financial resource strain: Not on file    Food insecurity:     Worry: Not on file     Inability: Not on file    Transportation needs:     Medical: Not on file     Non-medical: Not on file   Tobacco Use    Smoking status: Current Some Day Smoker     Types: Cigarettes    Smokeless tobacco: Never Used   Substance and Sexual Activity    Alcohol use: No    Drug use: No    Sexual activity: Yes     Partners: Male     Birth control/protection: None     Comment: single   Lifestyle    Physical activity:     Days per week: Not on file     Minutes per session: Not on file    Stress: Not on file   Relationships    Social connections:     Talks on phone: Not on file     Gets together: Not on file     Attends Confucianism service: Not on file     Active member of club or organization: Not on file     Attends meetings of clubs or organizations: Not on file     Relationship status: Not on file   Other Topics Concern    Not on file   Social History Narrative    Not on file     Family History   Problem Relation Age of Onset    Breast cancer Neg Hx     Colon cancer Neg Hx     Ovarian cancer Neg Hx      OB History        2    Para   1    Term   1       0     AB   0    Living   1       SAB   0    TAB   0    Ectopic   0    Multiple   0    Live Births   1                 /70 (BP Location: Left arm, Patient Position: Sitting)   Wt 65.8 kg (145 lb 1 oz)   LMP 10/16/2019 (Exact Date)   BMI 24.14 kg/m²       ROS:  GENERAL: Denies weight gain or weight loss. Feeling well overall.   SKIN: Denies rash or lesions.   HEAD: Denies head injury or headache.   NODES: Denies enlarged lymph nodes.   CHEST: Denies chest pain or shortness of breath.   CARDIOVASCULAR: Denies palpitations or left sided chest pain.   ABDOMEN: No abdominal pain, constipation, diarrhea, nausea, vomiting or rectal bleeding.   URINARY: No frequency, dysuria, hematuria, or burning on urination.  REPRODUCTIVE: See below  BREASTS: The patient performs breast self-examination and denies pain, lumps, or nipple discharge.   HEMATOLOGIC: No easy bruisability or excessive bleeding.   MUSCULOSKELETAL: Denies joint pain or swelling.   NEUROLOGIC: Denies syncope or weakness.   PSYCHIATRIC: Denies depression, anxiety or mood swings.    PHYSICAL EXAM:  APPEARANCE: Well nourished, well developed, in no acute distress.  AFFECT: WNL, alert and oriented x 3  SKIN: No acne or hirsutism  NECK: Neck symmetric without masses or thyromegaly  NODES: No inguinal, cervical, axillary, or femoral lymph node enlargement  CHEST: Good respiratory effect  ABDOMEN: Soft.  No tenderness or masses.  No hepatosplenomegaly.  No hernias.  BREASTS: Symmetrical, no skin changes or visible lesions.  No palpable masses, nipple discharge bilaterally.  PELVIC: see below   EXTREMITIES: No edema.                PATIENT ALSO HERE FOR A PROBLEM:    Patient presents for irregular bleeding.  Had 2 cycles in october.  Seen in the ER - UPT negative.  Repeat home UPT negative last week per patient.       PMH: above  PSH:  above  MEDICATIONS AND ALLERGIES: above  SH:  above    ROS:  See above  REPRODUCTIVE:   VULVA: No pain. No lesions. No  itching.  VAGINA: No relaxation. No itching. No odor. No discharge. No lesions.      PE:  SEE ABOVE  PELVIC: External female genitalia without lesions.  Female hair distribution. Adequate perineal body, Normal urethral meatus. Vagina moist and well rugated without lesions or discharge.   Cervix pink without lesions, discharge or tenderness. Uterus is normal size, regular, mobile and nontender. Adnexa without masses or tenderness.    UPT positive      ASSESSMENT    ICD-10-CM ICD-9-CM    1. Well woman exam Z01.419 V72.31 Liquid-Based Pap Smear, Screening      C. trachomatis/N. gonorrhoeae by AMP DNA   2. Irregular menses N92.6 626.4 POCT urine pregnancy         PLAN:  Well woman exam  -     Liquid-Based Pap Smear, Screening  -     C. trachomatis/N. gonorrhoeae by AMP DNA    Irregular menses  -     POCT urine pregnancy    Other orders  -     folic acid (FOLVITE) 1 MG tablet; Take 1 tablet (1 mg total) by mouth once daily.  Dispense: 90 tablet; Refill: 3        COUNSELING:  Continue folic acid  Schedule ultrasound and NOB visit  Patient was counseled today on A.C.S. Pap guidelines and recommendations for yearly pelvic exams, mammograms and monthly self breast exams; to see her PCP for other health maintenance.

## 2019-11-21 LAB
C TRACH DNA SPEC QL NAA+PROBE: NOT DETECTED
N GONORRHOEA DNA SPEC QL NAA+PROBE: NOT DETECTED

## 2019-11-29 LAB
FINAL PATHOLOGIC DIAGNOSIS: NORMAL
Lab: NORMAL

## 2019-11-30 ENCOUNTER — PATIENT MESSAGE (OUTPATIENT)
Dept: OBSTETRICS AND GYNECOLOGY | Facility: CLINIC | Age: 24
End: 2019-11-30

## 2019-11-30 RX ORDER — METRONIDAZOLE 7.5 MG/G
1 GEL VAGINAL DAILY
Qty: 70 G | Refills: 0 | Status: SHIPPED | OUTPATIENT
Start: 2019-11-30 | End: 2019-12-05

## 2019-12-17 ENCOUNTER — LAB VISIT (OUTPATIENT)
Dept: LAB | Facility: OTHER | Age: 24
End: 2019-12-17
Attending: OBSTETRICS & GYNECOLOGY
Payer: COMMERCIAL

## 2019-12-17 ENCOUNTER — PROCEDURE VISIT (OUTPATIENT)
Dept: OBSTETRICS AND GYNECOLOGY | Facility: CLINIC | Age: 24
End: 2019-12-17
Attending: OBSTETRICS & GYNECOLOGY
Payer: COMMERCIAL

## 2019-12-17 ENCOUNTER — INITIAL PRENATAL (OUTPATIENT)
Dept: OBSTETRICS AND GYNECOLOGY | Facility: CLINIC | Age: 24
End: 2019-12-17
Payer: COMMERCIAL

## 2019-12-17 VITALS
BODY MASS INDEX: 24.84 KG/M2 | DIASTOLIC BLOOD PRESSURE: 60 MMHG | WEIGHT: 149.25 LBS | SYSTOLIC BLOOD PRESSURE: 106 MMHG

## 2019-12-17 DIAGNOSIS — Z34.90 PREGNANCY, UNSPECIFIED GESTATIONAL AGE: ICD-10-CM

## 2019-12-17 DIAGNOSIS — Z98.891 HISTORY OF CESAREAN DELIVERY: ICD-10-CM

## 2019-12-17 DIAGNOSIS — Z3A.08 8 WEEKS GESTATION OF PREGNANCY: Primary | ICD-10-CM

## 2019-12-17 DIAGNOSIS — D69.6 THROMBOCYTOPENIA: ICD-10-CM

## 2019-12-17 DIAGNOSIS — Z36.89 ESTABLISH GESTATIONAL AGE, ULTRASOUND: ICD-10-CM

## 2019-12-17 DIAGNOSIS — Z3A.08 8 WEEKS GESTATION OF PREGNANCY: ICD-10-CM

## 2019-12-17 DIAGNOSIS — D57.219 SICKLE-CELL-HEMOGLOBIN C DISEASE WITH CRISIS: ICD-10-CM

## 2019-12-17 LAB
ABO + RH BLD: NORMAL
BILIRUB UR QL STRIP: NEGATIVE
BLD GP AB SCN CELLS X3 SERPL QL: NORMAL
CLARITY UR REFRACT.AUTO: CLEAR
COLOR UR AUTO: YELLOW
ERYTHROCYTE [DISTWIDTH] IN BLOOD BY AUTOMATED COUNT: 14 % (ref 11.5–14.5)
GLUCOSE UR QL STRIP: NEGATIVE
HBV SURFACE AG SERPL QL IA: NEGATIVE
HCT VFR BLD AUTO: 32.4 % (ref 37–48.5)
HGB BLD-MCNC: 11.5 G/DL (ref 12–16)
HGB UR QL STRIP: NEGATIVE
HIV 1+2 AB+HIV1 P24 AG SERPL QL IA: NEGATIVE
KETONES UR QL STRIP: NEGATIVE
LEUKOCYTE ESTERASE UR QL STRIP: NEGATIVE
MCH RBC QN AUTO: 31.3 PG (ref 27–31)
MCHC RBC AUTO-ENTMCNC: 35.5 G/DL (ref 32–36)
MCV RBC AUTO: 88 FL (ref 82–98)
NITRITE UR QL STRIP: NEGATIVE
PH UR STRIP: 8 [PH] (ref 5–8)
PLATELET # BLD AUTO: 127 K/UL (ref 150–350)
PMV BLD AUTO: ABNORMAL FL (ref 9.2–12.9)
PROT UR QL STRIP: NEGATIVE
RBC # BLD AUTO: 3.68 M/UL (ref 4–5.4)
SP GR UR STRIP: 1.01 (ref 1–1.03)
URN SPEC COLLECT METH UR: NORMAL
WBC # BLD AUTO: 6.66 K/UL (ref 3.9–12.7)

## 2019-12-17 PROCEDURE — 86592 SYPHILIS TEST NON-TREP QUAL: CPT

## 2019-12-17 PROCEDURE — 36415 COLL VENOUS BLD VENIPUNCTURE: CPT

## 2019-12-17 PROCEDURE — 76801 PR US, OB <14WKS, TRANSABD, SINGLE GESTATION: ICD-10-PCS | Mod: S$GLB,,, | Performed by: OBSTETRICS & GYNECOLOGY

## 2019-12-17 PROCEDURE — 76801 OB US < 14 WKS SINGLE FETUS: CPT | Mod: S$GLB,,, | Performed by: OBSTETRICS & GYNECOLOGY

## 2019-12-17 PROCEDURE — 87491 CHLMYD TRACH DNA AMP PROBE: CPT

## 2019-12-17 PROCEDURE — 0502F PR SUBSEQUENT PRENATAL CARE: ICD-10-PCS | Mod: CPTII,S$GLB,, | Performed by: OBSTETRICS & GYNECOLOGY

## 2019-12-17 PROCEDURE — 87086 URINE CULTURE/COLONY COUNT: CPT

## 2019-12-17 PROCEDURE — 99999 PR PBB SHADOW E&M-EST. PATIENT-LVL III: ICD-10-PCS | Mod: PBBFAC,,, | Performed by: OBSTETRICS & GYNECOLOGY

## 2019-12-17 PROCEDURE — 99999 PR PBB SHADOW E&M-EST. PATIENT-LVL III: CPT | Mod: PBBFAC,,, | Performed by: OBSTETRICS & GYNECOLOGY

## 2019-12-17 PROCEDURE — 86703 HIV-1/HIV-2 1 RESULT ANTBDY: CPT

## 2019-12-17 PROCEDURE — 81003 URINALYSIS AUTO W/O SCOPE: CPT

## 2019-12-17 PROCEDURE — 86762 RUBELLA ANTIBODY: CPT

## 2019-12-17 PROCEDURE — 87340 HEPATITIS B SURFACE AG IA: CPT

## 2019-12-17 PROCEDURE — 86901 BLOOD TYPING SEROLOGIC RH(D): CPT

## 2019-12-17 PROCEDURE — 85027 COMPLETE CBC AUTOMATED: CPT

## 2019-12-17 PROCEDURE — 0502F SUBSEQUENT PRENATAL CARE: CPT | Mod: CPTII,S$GLB,, | Performed by: OBSTETRICS & GYNECOLOGY

## 2019-12-17 RX ORDER — PROMETHAZINE HYDROCHLORIDE 6.25 MG/5ML
25 SYRUP ORAL EVERY 6 HOURS PRN
Qty: 118 ML | Refills: 2 | Status: SHIPPED | OUTPATIENT
Start: 2019-12-17 | End: 2020-02-20 | Stop reason: SDUPTHER

## 2019-12-18 ENCOUNTER — TELEPHONE (OUTPATIENT)
Dept: HEMATOLOGY/ONCOLOGY | Facility: CLINIC | Age: 24
End: 2019-12-18

## 2019-12-18 LAB
BACTERIA UR CULT: NO GROWTH
C TRACH DNA SPEC QL NAA+PROBE: NOT DETECTED
N GONORRHOEA DNA SPEC QL NAA+PROBE: NOT DETECTED
RPR SER QL: NORMAL
RUBV IGG SER-ACNC: 66.9 IU/ML
RUBV IGG SER-IMP: REACTIVE

## 2019-12-19 ENCOUNTER — PATIENT MESSAGE (OUTPATIENT)
Dept: MATERNAL FETAL MEDICINE | Facility: CLINIC | Age: 24
End: 2019-12-19

## 2019-12-19 PROBLEM — Z98.891 HISTORY OF CESAREAN DELIVERY: Status: ACTIVE | Noted: 2019-12-19

## 2019-12-20 ENCOUNTER — TELEPHONE (OUTPATIENT)
Dept: HEMATOLOGY/ONCOLOGY | Facility: CLINIC | Age: 24
End: 2019-12-20

## 2019-12-20 NOTE — TELEPHONE ENCOUNTER
Attempted to call pt & schedule hematology appt, no answer, Appt scheduled for 1-3-20, will mail appt letter.

## 2019-12-20 NOTE — PROGRESS NOTES
"Here today for initial OB visit. She reports history of  delivery x 2. 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 She has been followed by heme/onc in [place]. Saw Dr. Enriquez during prior pregnancy. She also has history of thrombocytopenia. States she took an oral medication during previous pregnancy. We have some records in [facility], but records are not complete. She reports having had a history of a stroke prior to her 2 previous pregnancies. She has an occular prosthesis due to this history of stroke. We do not have these records on file either. This was at [facility]. Records requested today. She denies being on anticoagulation during her previous pregnancies.     Problem Noted   History of  Delivery 2019    Initial  delivery at 37 weeks (induced for SC disease), underwent CD for category 2 tracing     Thrombocytopenia 2014    Per prior documentation, this is secondary to ITP     Hemoglobin S-C Disease 2014    Per MFM prior consult note from prior pregnancy: These patients have the potential to have the clinical manifestations similar to Sickle cell anemia but milder. The management is very similar. Considerations for the management of labor are similar to sickle cell anemia patients. These patients are at risk for antepartum admissions, fetal growth restriction, and postpartum infections.  Rates of pain crises, acute chest syndrome, and urinary tract infections are very similar to sickle cell anemia patients.  Seen by genetics in prior pregnancy.   FOB had negative testing.  Has documented "infarct to left femoral head on Xray" (2019)    #Referral to hematology here at [facility]. Followed by heme/onc in [place].  #urine culture q trimester  #folic acid 4 mg daily  Up to date with pneumo, meningococcaland hib vaccinations.        [facility] consultation given hemoglobin SC disease, ITP and prior stroke.  Referral to hematology/oncology.  Desires sequential screening.  Flu " shot recommended.     Zoila Donaldson MD  Obstetrics and Gynecology  [facility]

## 2019-12-28 PROBLEM — H35.022: Status: ACTIVE | Noted: 2019-12-28

## 2020-01-03 ENCOUNTER — INITIAL CONSULT (OUTPATIENT)
Dept: MATERNAL FETAL MEDICINE | Facility: CLINIC | Age: 25
End: 2020-01-03
Payer: MEDICAID

## 2020-01-03 ENCOUNTER — PROCEDURE VISIT (OUTPATIENT)
Dept: MATERNAL FETAL MEDICINE | Facility: CLINIC | Age: 25
End: 2020-01-03
Payer: MEDICAID

## 2020-01-03 ENCOUNTER — LAB VISIT (OUTPATIENT)
Dept: LAB | Facility: HOSPITAL | Age: 25
End: 2020-01-03
Attending: OBSTETRICS & GYNECOLOGY
Payer: MEDICAID

## 2020-01-03 ENCOUNTER — OFFICE VISIT (OUTPATIENT)
Dept: HEMATOLOGY/ONCOLOGY | Facility: CLINIC | Age: 25
End: 2020-01-03
Payer: COMMERCIAL

## 2020-01-03 VITALS
OXYGEN SATURATION: 100 % | HEIGHT: 65 IN | BODY MASS INDEX: 25.34 KG/M2 | TEMPERATURE: 98 F | RESPIRATION RATE: 16 BRPM | SYSTOLIC BLOOD PRESSURE: 110 MMHG | DIASTOLIC BLOOD PRESSURE: 54 MMHG | WEIGHT: 152.13 LBS | HEART RATE: 80 BPM

## 2020-01-03 VITALS — DIASTOLIC BLOOD PRESSURE: 66 MMHG | SYSTOLIC BLOOD PRESSURE: 110 MMHG

## 2020-01-03 DIAGNOSIS — M87.052 AVASCULAR NECROSIS OF FEMUR HEAD, LEFT: ICD-10-CM

## 2020-01-03 DIAGNOSIS — D57.219 SICKLE-CELL-HEMOGLOBIN C DISEASE WITH CRISIS: ICD-10-CM

## 2020-01-03 DIAGNOSIS — D57.20 SICKLE CELL-HEMOGLOBIN C DISEASE WITHOUT CRISIS: Primary | ICD-10-CM

## 2020-01-03 DIAGNOSIS — R06.09 DYSPNEA ON EXERTION: ICD-10-CM

## 2020-01-03 DIAGNOSIS — Z36.82 ENCOUNTER FOR ANTENATAL SCREENING FOR NUCHAL TRANSLUCENCY: ICD-10-CM

## 2020-01-03 DIAGNOSIS — Z36.89 ENCOUNTER FOR FETAL ANATOMIC SURVEY: ICD-10-CM

## 2020-01-03 DIAGNOSIS — D69.6 THROMBOCYTOPENIA: Primary | ICD-10-CM

## 2020-01-03 DIAGNOSIS — D63.8 ANEMIA DUE TO CHRONIC ILLNESS: ICD-10-CM

## 2020-01-03 DIAGNOSIS — Z3A.08 8 WEEKS GESTATION OF PREGNANCY: ICD-10-CM

## 2020-01-03 DIAGNOSIS — Z36.82 ENCOUNTER FOR ANTENATAL SCREENING FOR NUCHAL TRANSLUCENCY: Primary | ICD-10-CM

## 2020-01-03 DIAGNOSIS — G45.9 TIA (TRANSIENT ISCHEMIC ATTACK): ICD-10-CM

## 2020-01-03 DIAGNOSIS — H35.022 COATS' DISEASE OF LEFT EYE: ICD-10-CM

## 2020-01-03 PROCEDURE — 99999 PR PBB SHADOW E&M-EST. PATIENT-LVL III: CPT | Mod: PBBFAC,,, | Performed by: OBSTETRICS & GYNECOLOGY

## 2020-01-03 PROCEDURE — 99499 NO LOS: ICD-10-PCS | Mod: S$GLB,,, | Performed by: OBSTETRICS & GYNECOLOGY

## 2020-01-03 PROCEDURE — 99205 OFFICE O/P NEW HI 60 MIN: CPT | Mod: S$PBB,,, | Performed by: INTERNAL MEDICINE

## 2020-01-03 PROCEDURE — 3008F BODY MASS INDEX DOCD: CPT | Mod: CPTII,S$GLB,, | Performed by: INTERNAL MEDICINE

## 2020-01-03 PROCEDURE — 76813 OB US NUCHAL MEAS 1 GEST: CPT | Mod: 26,S$PBB,, | Performed by: OBSTETRICS & GYNECOLOGY

## 2020-01-03 PROCEDURE — 99499 UNLISTED E&M SERVICE: CPT | Mod: S$GLB,,, | Performed by: OBSTETRICS & GYNECOLOGY

## 2020-01-03 PROCEDURE — 84163 PAPPA SERUM: CPT

## 2020-01-03 PROCEDURE — 99999 PR PBB SHADOW E&M-EST. PATIENT-LVL IV: CPT | Mod: PBBFAC,,, | Performed by: INTERNAL MEDICINE

## 2020-01-03 PROCEDURE — 99999 PR PBB SHADOW E&M-EST. PATIENT-LVL III: ICD-10-PCS | Mod: PBBFAC,,, | Performed by: OBSTETRICS & GYNECOLOGY

## 2020-01-03 PROCEDURE — 99999 PR PBB SHADOW E&M-EST. PATIENT-LVL IV: ICD-10-PCS | Mod: PBBFAC,,, | Performed by: INTERNAL MEDICINE

## 2020-01-03 PROCEDURE — 3008F PR BODY MASS INDEX (BMI) DOCUMENTED: ICD-10-PCS | Mod: CPTII,S$GLB,, | Performed by: INTERNAL MEDICINE

## 2020-01-03 PROCEDURE — 76813 PR US, OB NUCHAL, TRANSABDOM/TRANSVAG, FIRST GESTATION: ICD-10-PCS | Mod: 26,S$PBB,, | Performed by: OBSTETRICS & GYNECOLOGY

## 2020-01-03 PROCEDURE — 76813 OB US NUCHAL MEAS 1 GEST: CPT | Mod: PBBFAC | Performed by: OBSTETRICS & GYNECOLOGY

## 2020-01-03 PROCEDURE — 99205 PR OFFICE/OUTPT VISIT, NEW, LEVL V, 60-74 MIN: ICD-10-PCS | Mod: S$PBB,,, | Performed by: INTERNAL MEDICINE

## 2020-01-03 RX ORDER — ENOXAPARIN SODIUM 100 MG/ML
60 INJECTION SUBCUTANEOUS DAILY
Qty: 30 SYRINGE | Refills: 10 | Status: SHIPPED | OUTPATIENT
Start: 2020-01-03

## 2020-01-03 RX ORDER — HEPARIN 100 UNIT/ML
500 SYRINGE INTRAVENOUS
OUTPATIENT
Start: 2020-01-06

## 2020-01-03 RX ORDER — SODIUM CHLORIDE 0.9 % (FLUSH) 0.9 %
10 SYRINGE (ML) INJECTION
OUTPATIENT
Start: 2020-01-06

## 2020-01-03 RX ORDER — FOLIC ACID 1 MG/1
1 TABLET ORAL DAILY
Qty: 90 TABLET | Refills: 3 | Status: SHIPPED | OUTPATIENT
Start: 2020-01-03 | End: 2020-02-26

## 2020-01-03 RX ORDER — VALACYCLOVIR HYDROCHLORIDE 500 MG/1
1000 TABLET, FILM COATED ORAL 2 TIMES DAILY
Qty: 14 TABLET | Refills: 0 | Status: SHIPPED | OUTPATIENT
Start: 2020-01-03 | End: 2020-03-03

## 2020-01-03 NOTE — PROGRESS NOTES
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 She was evaluated in Jan 2019 by Dr.Ernest Wallis. She is currently 11 weeks pregnant.  She reports her crises consist of generalized pain L>R. She feels that her muscles go into spasm with extremely uncomfortable pain in her joints and bones. Denies any acute chest syndrome or requiring intubations. Reports a TIA where she lost her eye (currently has a L eye prosthesis) when she was 15 or 16 years old. She also has migraines. Her last transfusion was >1 year ago but has required several in her lifetime.   She has 3-4 crises in any given year. She has 2 successful pregnancies in the past ( her children are 4 and 5 year old now). Both required emergency C section, but at term.  Her last pain crisis was about 4 months ago. She does not recollect clear exacerbating factors for these crises, but cold weather can precipitate them.   She has ongoing nausea and emesis.     Review of Systems   Constitutional: Positive for malaise/fatigue. Negative for chills, fever and weight loss.   HENT: Negative for congestion, ear discharge, ear pain, nosebleeds and sinus pain.    Eyes: Positive for blurred vision. Negative for double vision, photophobia and pain.        She has no vision in left eye   Respiratory: Negative for hemoptysis, sputum production and stridor.    Cardiovascular: Negative for chest pain and palpitations.   Gastrointestinal: Negative for abdominal pain, heartburn, nausea and vomiting.   Genitourinary: Negative for dysuria, frequency, hematuria and urgency.   Musculoskeletal: Negative for back pain and neck pain.   Skin: Negative for rash.   Neurological: Negative for dizziness, tremors, speech change, focal weakness and headaches.   Endo/Heme/Allergies: Negative for environmental allergies. Does not bruise/bleed easily.   Psychiatric/Behavioral: Negative for depression. The  patient is not nervous/anxious and does not have insomnia.        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               Past Surgical History:   Procedure Laterality Date     SECTION  2014     delivery x 2    EYE SURGERY      left eye         Social History     Socioeconomic History    Marital status: Single     Spouse name: Not on file    Number of children: Not on file    Years of education: Not on file    Highest education level: Not on file   Occupational History    Not on file   Tobacco Use    Smoking status: Current Some Day Smoker     Types: Cigarettes    Smokeless tobacco: Never Used   Substance and Sexual Activity    Alcohol use: No    Drug use: No    Sexual activity: Yes     Partners: Male     Birth control/protection: None     Comment: single           Family History   Problem Relation Age of Onset    Breast cancer Neg Hx     Colon cancer Neg Hx     Ovarian cancer Neg Hx     Cancer Neg Hx     Diabetes Neg Hx     Eclampsia Neg Hx     Hypertension Neg Hx     Miscarriages / Stillbirths Neg Hx      labor Neg Hx     Stroke Neg Hx            Vitals:    20 1021   BP: (!) 110/54   Pulse: 80   Resp: 16   Temp: 98.3 °F (36.8 °C)     Physical Exam   Constitutional: She is oriented to person, place, and time. She appears well-developed. No distress.   HENT:   Head: Normocephalic.   Mouth/Throat: No oropharyngeal exudate.   She has vesicular lesions along the right lateral edge of lower lip, and in the philtrum region   Eyes: No scleral icterus.   Cardiovascular: Normal rate and regular rhythm.   Murmur heard.  Pulmonary/Chest: Effort normal. No respiratory distress. She has no wheezes. She has no rales.   Abdominal: Soft. She exhibits no distension. There is no  tenderness.   Musculoskeletal: She exhibits no edema.   Lymphadenopathy:     She has no cervical adenopathy.   Neurological: She is alert and oriented to person, place, and time. No cranial nerve deficit.   Skin: Skin is warm.   Psychiatric: She has a normal mood and affect.         Component      Latest Ref Rng & Units 2019   Specimen UA       Urine, Clean Catch   Color, UA      Yellow, Straw, Estella Yellow   Appearance, UA      Clear Clear   pH, UA      5.0 - 8.0 8.0   Specific Gravity, UA      1.005 - 1.030 1.010   Protein, UA      Negative Negative   Glucose, UA      Negative Negative   Ketones, UA      Negative Negative   Bilirubin (UA)      Negative Negative   Occult Blood UA      Negative Negative   NITRITE UA      Negative Negative   Leukocytes, UA      Negative Negative   WBC      3.90 - 12.70 K/uL 6.66   RBC      4.00 - 5.40 M/uL 3.68 (L)   Hemoglobin      12.0 - 16.0 g/dL 11.5 (L)   Hematocrit      37.0 - 48.5 % 32.4 (L)   MCV      82 - 98 fL 88   MCH      27.0 - 31.0 pg 31.3 (H)   MCHC      32.0 - 36.0 g/dL 35.5   RDW      11.5 - 14.5 % 14.0   Platelets      150 - 350 K/uL 127 (L)   MPV      9.2 - 12.9 fL SEE COMMENT   Group & Rh       O POS   INDIRECT CHONG       NEG   Hepatitis B Surface Ag      Negative Negative   HIV 1/2 Ag/Ab      Negative Negative         Assessment:    1. Hemoglobin SC disease  2. Pregnancy  3. Thrombocytopenia, chronic  4. retinopathy  5. H/o TIA  6. Avascular necrosis of left hip  7. Supportive care  8. Herpes labialis    Plan:    1,2 : SCD (less commonly in Hb SC, as in this patient)  is associated with both maternal and fetal complications and is associated with an increased incidence of  mortality, premature labour, fetal growth restriction and acute painful crises during  Pregnancy. She follows with maternal fetal medicine at [facility]( )    --I discussed the role of dehydration, cold, hypoxia, overexertion and stress as contributors to sickle  cell crises. She should seek prompt medical attention.   --discussed  how nausea and vomiting in pregnancy can result in dehydration and the precipitation of crises. She should maintain very good hydration and seek medical attention.   --she will be scheduled for hydration twice weekly, as needed, in view of ongoing emesis and nausea  --discussed the risk of worsening anaemia, the increased risk of crises and acute chest syndrome and the risk of increased infection (especially urinary tract infection) during pregnancy.  --Screening for pulmonary hypertension with echocardiography is needed, as incidence of pulmonary hypertension is increased in patients with SCD and is associated with increased mortality.   She is not sure if she has had 2D ECHO done previously  -- Blood pressure and urinalysis should be performed regularly in pregnancy ; mid stream urine culture is recomended monthly  --She is uptodate with retinal screening.   --Screening for iron overload is required. However, she has not been heavily transfused. If iron overloaded, cardiac MRI is indicated.   -- Screening for red cell antibodies is indicated. Last screen negative in .   Red cell antibodies may indicate an increased risk of hemolytic disease of the .   --Analgesic use should ideally be per World Health Organization analgesic ladder :  * starting with Acetaminophen  for mild pain;  *NSAIDs can be used for mild to moderate pain between 12 and 28 weeks of gestation.   *Weak opioids such as Tramadol can be used for moderate pain  * Stronger opiates such as morphine can be used for severe pain.   --In view of TIA history, recommend LMWH @ 60 mg sub q once daily through her pregnancy. She is ware she has to call if she notices any bleeding  --she is on pre-diamante vitamins; she will also start daily folic acid      3.  She has h/o thrombocytopenia of unclear etiology. She has no bleeding history. She will have monthly monitoring of CBC while  on Lovenox. HIV non reactive.  Hep B surface antigen negative. She will have AN checked, as well as B12, folate, hep C Ab, hep B core antibody.     4: Recommend periodic ophthalmologic evaluation. She has no vision in her left eye.    5.  Anti-cardiolipin antibodies were negative in 2017. The hexagonal phospholipid was positive, but the DRVVT was negative.  Recommend Lovenox during the current pregnancy@ 60 mg daily sub q. Recommend ASA 81mg daily post partum.    7. She will have 2D ECHO checked to r/o cardiomyopathy/ pulmonary HTn. She will use compression stockings if she is travelling long distance, along with frequent ambulation.    She is uptodate with meningococcal vaccine, pneumococcal vaccine. She will receive influenza vaccine next week. Her partner has been screened for sickle cell disease and is negative.      7. Herpes labialis: She will take Valtrex 1g BIDX 7 days( safe in pregnancy)

## 2020-01-03 NOTE — Clinical Note
--ALLYSON, B12, folate, hep C antibody, hepatitis B core antibody, LDH, retic, iron, tibc, ferritin, hemoglobin electrophoresis today--2d echo at [facility] on 1/7/20--home health referral (pt needs to be taught to inject Lovenox)

## 2020-01-03 NOTE — LETTER
January 3, 2020      Zoila Donaldson MD  [address] [facility] [address]           [facility]  [address]  [facility] [address]  Phone: [phone]          Patient: Murali Tarango   MR Number: [id number]   YOB: 1995   Date of Visit: 1/3/2020       Dear Dr. Zoila Donaldson:    Thank you for referring Murali Tarango to me for evaluation. Attached you will find relevant portions of my assessment and plan of care.    If you have questions, please do not hesitate to call me. I look forward to following Murali Tarango along with you.    Sincerely,    Elena Shankar MD    Enclosure  CC:  No Recipients    If you would like to receive this communication electronically, please contact [email or web address] or [phone] to request more information on [employer] Link access.    For providers and/or their staff who would like to refer a patient to [facility], please contact us through our one-stop-shop provider referral line, [facility], at [phone].    If you feel you have received this communication in error or would no longer like to receive these types of communications, please e-mail [email or web address]

## 2020-01-06 LAB — INTEGRATED SCN PATIENT-IMP NAR: NORMAL

## 2020-01-08 ENCOUNTER — TELEPHONE (OUTPATIENT)
Dept: MATERNAL FETAL MEDICINE | Facility: CLINIC | Age: 25
End: 2020-01-08

## 2020-01-08 ENCOUNTER — DOCUMENTATION ONLY (OUTPATIENT)
Dept: MATERNAL FETAL MEDICINE | Facility: CLINIC | Age: 25
End: 2020-01-08

## 2020-01-13 ENCOUNTER — TELEPHONE (OUTPATIENT)
Dept: MATERNAL FETAL MEDICINE | Facility: CLINIC | Age: 25
End: 2020-01-13

## 2020-01-13 NOTE — TELEPHONE ENCOUNTER
Message left for pt to call MFM clinic at [phone] regarding MFM consult appointment.    ----- Message from Tamara Henderson sent at 1/13/2020  2:18 PM CST -----  Contact: self  Pt is calling to see if she can come at a later time bc she may not have coverage at work. She can be reached at [phone]. sh

## 2020-01-16 ENCOUNTER — DOCUMENTATION ONLY (OUTPATIENT)
Dept: HEMATOLOGY/ONCOLOGY | Facility: CLINIC | Age: 25
End: 2020-01-16

## 2020-01-16 ENCOUNTER — TELEPHONE (OUTPATIENT)
Dept: OBSTETRICS AND GYNECOLOGY | Facility: CLINIC | Age: 25
End: 2020-01-16

## 2020-01-16 NOTE — LETTER
January 16, 2020    Murali Tinsley STEPH Tarango  [address] [facility] [facility] [address]              [facility] [id number] [address]  Phone: [phone]  Fax: [phone]      To Whom It May Concern:    Ms. Tarango is currently under our care for pregnancy.  Estimated Date of Delivery: 7/22/20    Any elective dental work should preferably be scheduled during or after the mid-trimester or postpartum.    Dental procedures can be performed with local anesthesia without epinephrine. Recommended antibiotics include penicillins, erythromycin, and cephalosporins. Tylenol #3 or Percocet may be used for pain control. No x-rays are allowed for routine screening. For dental problems, up to four x-rays may be taken with proper abdominal shield.    Sincerely,    Rae Cantu MA

## 2020-01-16 NOTE — PROGRESS NOTES
Received request to assist with home health.  Called patient to review agency choices; callback requested.  No answer on callback; left message on voicemail.  Will follow.

## 2020-01-16 NOTE — TELEPHONE ENCOUNTER
01/16/2020  11:44 AM    Sent letter        ----- Message from Jamila Mao sent at 1/16/2020 11:36 AM CST -----  Contact: Pt   Name of Who is Calling: JEM ALCANTARA [[id number]]    What is the request in detail: Pt is requesting a call back reagrds to a clearance to do xray at the dentist office [facility] Fax#[phone] today ..... Please contact to further discuss and advise      Can the clinic reply by [facility]: No     [facility]  [phone] (home)

## 2020-01-17 ENCOUNTER — DOCUMENTATION ONLY (OUTPATIENT)
Dept: HEMATOLOGY/ONCOLOGY | Facility: CLINIC | Age: 25
End: 2020-01-17

## 2020-01-17 NOTE — PROGRESS NOTES
Second message left for patient on alternate phone re: selection of home health provider.  Will follow.

## 2020-01-20 ENCOUNTER — DOCUMENTATION ONLY (OUTPATIENT)
Dept: HEMATOLOGY/ONCOLOGY | Facility: CLINIC | Age: 25
End: 2020-01-20

## 2020-01-20 NOTE — PROGRESS NOTES
Spoke to patient who expressed no preference for a home health agency.  Consulted insurance website for in-network providers and faxed referral to [facility].  Office closed for holiday.  Will follow.

## 2020-01-21 ENCOUNTER — DOCUMENTATION ONLY (OUTPATIENT)
Dept: HEMATOLOGY/ONCOLOGY | Facility: CLINIC | Age: 25
End: 2020-01-21

## 2020-01-21 NOTE — PROGRESS NOTES
Declined by [facility] and [facility] due to pregnancy.  [facility], [facility], and [facility] are currently not taking her insurance despite being identified providers.  Online contact information for [facility], [facility], and [facility] invalid.  Enlisted [facility] to assist in finding services; Nola at [facility] found that the patient is ineligible for home health as she is working part time and not homebound.  Notified clinic RN and MD of need for alternate method for education on Lovenox injections.  Will follow.

## 2020-01-21 NOTE — PROGRESS NOTES
Received two messages from [facility] stating they could not accept the patient.  [facility] capped on LH patients.  [facility] reported accepting new [facility] patients; faxed referral.  Will follow.

## 2020-01-28 ENCOUNTER — ROUTINE PRENATAL (OUTPATIENT)
Dept: OBSTETRICS AND GYNECOLOGY | Facility: CLINIC | Age: 25
End: 2020-01-28
Payer: MEDICAID

## 2020-01-28 VITALS
SYSTOLIC BLOOD PRESSURE: 130 MMHG | WEIGHT: 151.69 LBS | DIASTOLIC BLOOD PRESSURE: 62 MMHG | BODY MASS INDEX: 25.24 KG/M2

## 2020-01-28 DIAGNOSIS — Z86.69 HISTORY OF RETINAL DETACHMENT: ICD-10-CM

## 2020-01-28 DIAGNOSIS — Z3A.14 14 WEEKS GESTATION OF PREGNANCY: Primary | ICD-10-CM

## 2020-01-28 PROCEDURE — 99213 OFFICE O/P EST LOW 20 MIN: CPT | Mod: S$PBB,TH,, | Performed by: OBSTETRICS & GYNECOLOGY

## 2020-01-28 PROCEDURE — 99213 PR OFFICE/OUTPT VISIT, EST, LEVL III, 20-29 MIN: ICD-10-PCS | Mod: S$PBB,TH,, | Performed by: OBSTETRICS & GYNECOLOGY

## 2020-01-28 PROCEDURE — 99999 PR PBB SHADOW E&M-EST. PATIENT-LVL II: CPT | Mod: PBBFAC,,, | Performed by: OBSTETRICS & GYNECOLOGY

## 2020-01-28 PROCEDURE — 87086 URINE CULTURE/COLONY COUNT: CPT

## 2020-01-28 PROCEDURE — 82570 ASSAY OF URINE CREATININE: CPT

## 2020-01-28 PROCEDURE — 99212 OFFICE O/P EST SF 10 MIN: CPT | Mod: PBBFAC | Performed by: OBSTETRICS & GYNECOLOGY

## 2020-01-28 PROCEDURE — 99999 PR PBB SHADOW E&M-EST. PATIENT-LVL II: ICD-10-PCS | Mod: PBBFAC,,, | Performed by: OBSTETRICS & GYNECOLOGY

## 2020-01-29 LAB
CREAT UR-MCNC: 91 MG/DL (ref 15–325)
PROT UR-MCNC: <7 MG/DL (ref 0–15)
PROT/CREAT UR: NORMAL MG/G{CREAT} (ref 0–0.2)

## 2020-01-29 NOTE — PROGRESS NOTES
"Doing well today. No OB concerns. She missed her ultrasound for NT/sequential screen.     Problem Noted   Coats' Disease of Left Eye 2019    Reviewed records from [facility] by Dr. Swain.  She has a history of coats disease and serous retinal detachment. Underwent PRP and avastin ()  Lase sx for presumed sickle cell retinopathy   Then underwent AGV (Ahmed Glaucoma Valve) in   In , left evisceration, left removal of glaucoma valve, left frost suture, insertion of acrylic implant     #Referral to opthamology placed  #Lovenox in pregnancy, needs ASA daily postpartum      History of  Delivery 2019    Initial  delivery at 37 weeks (induced for SC disease), underwent CD for category 2 tracing     Thrombocytopenia 2014    Per prior documentation, this is secondary to ITP    Per heme/onc, she has h/o thrombocytopenia of unclear etiology. She has no bleeding history.     #Monthly CBC while on lovenox  HIV Neg, Hep B Neg, Hep C neg, folate and B12 normal   She will have monthly monitoring of CBC while on Lovenox.                Hemoglobin S-C Disease 2014    Per [facility] prior consult note from prior pregnancy: These patients have the potential to have the clinical manifestations similar to Sickle cell anemia but milder. The management is very similar. Considerations for the management of labor are similar to sickle cell anemia patients. These patients are at risk for antepartum admissions, fetal growth restriction, and postpartum infections.  Rates of pain crises, acute chest syndrome, and urinary tract infections are very similar to sickle cell anemia patients.  Seen by genetics in prior pregnancy.   FOB had negative testing.  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 Followed by heme/onc in [place].  #urine culture q trimester  #folic acid 4 mg daily  Up to date with pneumo, meningococcaland hib vaccinations.       Per " heme/onc  - Importance of hydration. She will be scheduled for hydration twice weekly, as needed, in view of ongoing emesis and nausea  - Needs echocardiogram, ordered and scheduled today  --Screening for pulmonary hypertension with echocardiography is needed, as incidence of pulmonary hypertension is increased in patients with SCD and is associated with increased mortality.   She is not sure if she has had 2D ECHO done previously  -Blood pressure and urinalysis should be performed regularly in pregnancy. Mid stream urine culture is recomended monthly  - Iron studies ordered by heme/onc If iron overloaded, cardiac MRI is indicated.   -In view of TIA history, recommend LMWH @ 60 mg sub q once daily through her pregnancy.   -Continue PNV and folic acid               Will offer QUAD between 16-18 weeks.  RTO in 3 weeks.     Zoila Donaldson MD  Obstetrics and Gynecology  [facility]

## 2020-01-30 ENCOUNTER — TELEPHONE (OUTPATIENT)
Dept: HEMATOLOGY/ONCOLOGY | Facility: CLINIC | Age: 25
End: 2020-01-30

## 2020-01-30 LAB
BACTERIA UR CULT: NORMAL
BACTERIA UR CULT: NORMAL

## 2020-01-30 NOTE — TELEPHONE ENCOUNTER
Called patient to follow up on dr chamebrs's orders from his last office visit. Left  for patient requesting that she call back

## 2020-02-02 ENCOUNTER — PATIENT MESSAGE (OUTPATIENT)
Dept: ADMINISTRATIVE | Facility: OTHER | Age: 25
End: 2020-02-02

## 2020-02-13 ENCOUNTER — TELEPHONE (OUTPATIENT)
Dept: OBSTETRICS AND GYNECOLOGY | Facility: CLINIC | Age: 25
End: 2020-02-13

## 2020-02-13 NOTE — TELEPHONE ENCOUNTER
----- Message from Raul Rasmussen sent at 2/13/2020  1:30 PM CST -----  Please call ob pt she needs a letter saying it is ok for her to see her dentist  [phone] this is her 2nd or 3th time calling

## 2020-02-17 ENCOUNTER — TELEPHONE (OUTPATIENT)
Dept: OPTOMETRY | Facility: CLINIC | Age: 25
End: 2020-02-17

## 2020-02-20 ENCOUNTER — LAB VISIT (OUTPATIENT)
Dept: LAB | Facility: OTHER | Age: 25
End: 2020-02-20
Attending: OBSTETRICS & GYNECOLOGY
Payer: MEDICAID

## 2020-02-20 ENCOUNTER — ROUTINE PRENATAL (OUTPATIENT)
Dept: OBSTETRICS AND GYNECOLOGY | Facility: CLINIC | Age: 25
End: 2020-02-20
Payer: MEDICAID

## 2020-02-20 VITALS
DIASTOLIC BLOOD PRESSURE: 60 MMHG | BODY MASS INDEX: 25.31 KG/M2 | WEIGHT: 152.13 LBS | SYSTOLIC BLOOD PRESSURE: 108 MMHG

## 2020-02-20 DIAGNOSIS — D69.6 THROMBOCYTOPENIA: ICD-10-CM

## 2020-02-20 DIAGNOSIS — Z3A.18 18 WEEKS GESTATION OF PREGNANCY: ICD-10-CM

## 2020-02-20 DIAGNOSIS — Z3A.18 18 WEEKS GESTATION OF PREGNANCY: Primary | ICD-10-CM

## 2020-02-20 LAB
ALBUMIN SERPL BCP-MCNC: 3.6 G/DL (ref 3.5–5.2)
ALP SERPL-CCNC: 55 U/L (ref 55–135)
ALT SERPL W/O P-5'-P-CCNC: 8 U/L (ref 10–44)
ANION GAP SERPL CALC-SCNC: 6 MMOL/L (ref 8–16)
AST SERPL-CCNC: 19 U/L (ref 10–40)
BILIRUB SERPL-MCNC: 1 MG/DL (ref 0.1–1)
BUN SERPL-MCNC: 6 MG/DL (ref 6–20)
CALCIUM SERPL-MCNC: 8.8 MG/DL (ref 8.7–10.5)
CHLORIDE SERPL-SCNC: 107 MMOL/L (ref 95–110)
CO2 SERPL-SCNC: 23 MMOL/L (ref 23–29)
CREAT SERPL-MCNC: 0.7 MG/DL (ref 0.5–1.4)
ERYTHROCYTE [DISTWIDTH] IN BLOOD BY AUTOMATED COUNT: 13.9 % (ref 11.5–14.5)
EST. GFR  (AFRICAN AMERICAN): >60 ML/MIN/1.73 M^2
EST. GFR  (NON AFRICAN AMERICAN): >60 ML/MIN/1.73 M^2
GLUCOSE SERPL-MCNC: 95 MG/DL (ref 70–110)
HCT VFR BLD AUTO: 28 % (ref 37–48.5)
HGB BLD-MCNC: 9.5 G/DL (ref 12–16)
MCH RBC QN AUTO: 30.9 PG (ref 27–31)
MCHC RBC AUTO-ENTMCNC: 33.9 G/DL (ref 32–36)
MCV RBC AUTO: 91 FL (ref 82–98)
PLATELET # BLD AUTO: 108 K/UL (ref 150–350)
PMV BLD AUTO: ABNORMAL FL (ref 9.2–12.9)
POTASSIUM SERPL-SCNC: 4.1 MMOL/L (ref 3.5–5.1)
PROT SERPL-MCNC: 7.1 G/DL (ref 6–8.4)
RBC # BLD AUTO: 3.07 M/UL (ref 4–5.4)
SODIUM SERPL-SCNC: 136 MMOL/L (ref 136–145)
WBC # BLD AUTO: 6.45 K/UL (ref 3.9–12.7)

## 2020-02-20 PROCEDURE — 36415 COLL VENOUS BLD VENIPUNCTURE: CPT

## 2020-02-20 PROCEDURE — 87086 URINE CULTURE/COLONY COUNT: CPT

## 2020-02-20 PROCEDURE — 99999 PR PBB SHADOW E&M-EST. PATIENT-LVL III: ICD-10-PCS | Mod: PBBFAC,,, | Performed by: OBSTETRICS & GYNECOLOGY

## 2020-02-20 PROCEDURE — 85027 COMPLETE CBC AUTOMATED: CPT

## 2020-02-20 PROCEDURE — 80053 COMPREHEN METABOLIC PANEL: CPT

## 2020-02-20 PROCEDURE — 99999 PR PBB SHADOW E&M-EST. PATIENT-LVL III: CPT | Mod: PBBFAC,,, | Performed by: OBSTETRICS & GYNECOLOGY

## 2020-02-20 PROCEDURE — 99213 PR OFFICE/OUTPT VISIT, EST, LEVL III, 20-29 MIN: ICD-10-PCS | Mod: S$PBB,TH,, | Performed by: OBSTETRICS & GYNECOLOGY

## 2020-02-20 PROCEDURE — 99213 OFFICE O/P EST LOW 20 MIN: CPT | Mod: S$PBB,TH,, | Performed by: OBSTETRICS & GYNECOLOGY

## 2020-02-20 PROCEDURE — 99213 OFFICE O/P EST LOW 20 MIN: CPT | Mod: PBBFAC | Performed by: OBSTETRICS & GYNECOLOGY

## 2020-02-20 PROCEDURE — 81511 FTL CGEN ABNOR FOUR ANAL: CPT

## 2020-02-20 RX ORDER — PROMETHAZINE HYDROCHLORIDE 6.25 MG/5ML
25 SYRUP ORAL EVERY 6 HOURS PRN
Qty: 118 ML | Refills: 2 | Status: SHIPPED | OUTPATIENT
Start: 2020-02-20

## 2020-02-20 NOTE — PROGRESS NOTES
"Good FM. Some nausea and difficulty sleeping. Otherwise feeling well.    Problem Noted   Coats' Disease of Left Eye 2019    Reviewed records from [facility] by Dr. Swain.  She has a history of coats disease and serous retinal detachment. Underwent PRP and avastin ()  Lase sx for presumed sickle cell retinopathy   Then underwent AGV (Ahmed Glaucoma Valve) in   In , left evisceration, left removal of glaucoma valve, left frost suture, insertion of acrylic implant     #Referral to opthamology placed  #Lovenox in pregnancy, needs ASA daily postpartum      History of  Delivery 2019    Initial  delivery at 37 weeks (induced for SC disease), underwent CD for category 2 tracing     Thrombocytopenia 2014    Per prior documentation, this is secondary to ITP    Per heme/onc, she has h/o thrombocytopenia of unclear etiology. She has no bleeding history.     #Monthly CBC while on lovenox  HIV Neg, Hep B Neg, Hep C neg, folate and B12 normal   She will have monthly monitoring of CBC while on Lovenox.                Hemoglobin S-C Disease 2014    Per M prior consult note from prior pregnancy: These patients have the potential to have the clinical manifestations similar to Sickle cell anemia but milder. The management is very similar. Considerations for the management of labor are similar to sickle cell anemia patients. These patients are at risk for antepartum admissions, fetal growth restriction, and postpartum infections.  Rates of pain crises, acute chest syndrome, and urinary tract infections are very similar to sickle cell anemia patients.  Seen by genetics in prior pregnancy.   FOB had negative testing.  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 Followed by heme/onc in [place].  #urine culture q trimester  #folic acid 4 mg daily  Up to date with pneumo, meningococcaland hib vaccinations.       Per heme/onc  - " Importance of hydration. She will be scheduled for hydration twice weekly, as needed, in view of ongoing emesis and nausea  - Needs echocardiogram, ordered and scheduled today  --Screening for pulmonary hypertension with echocardiography is needed, as incidence of pulmonary hypertension is increased in patients with SCD and is associated with increased mortality.   She is not sure if she has had 2D ECHO done previously  -Blood pressure and urinalysis should be performed regularly in pregnancy. Mid stream urine culture is recomended monthly  - Iron studies ordered by heme/onc If iron overloaded, cardiac MRI is indicated.   -In view of TIA history, recommend LMWH @ 60 mg sub q once daily through her pregnancy.   -Continue PNV and folic acid             Sequential screen part 2 today.  CBC and CMP.  Needs echocardiogram scheduled.  Urine culture today.    Zoila Donaldson MD  Obstetrics and Gynecology  [facility]

## 2020-02-22 LAB
BACTERIA UR CULT: NORMAL
BACTERIA UR CULT: NORMAL

## 2020-02-25 LAB
# FETUSES US: NORMAL
AFP MOM SERPL: 0.69
AFP SERPL-MCNC: 34.7 NG/ML
AGE AT DELIVERY: 26
B-HCG MOM SERPL: 0.88
B-HCG SERPL-ACNC: 16.9 IU/ML
COLLECT DATE BLD: NORMAL
COLLECT DATE: NORMAL
FET TS 21 RISK FROM MAT AGE: NORMAL
GA (DAYS): 2 D
GA (WEEKS): 11 WK
GA METHOD: NORMAL
GEST. AGE (DAYS) 2ND SAMPLE (SI2): 1
GEST. AGE (WKS) 2ND SAMPLE (SI2): 18
IDDM PATIENT QL: NORMAL
INHIBIN A MOM SERPL: 0.54
INHIBIN A SERPL-MCNC: 130.8 PG/ML
INTEGRATED SCN PATIENT-IMP NAR: NORMAL
INTEGRATED SCN PATIENT-IMP: NEGATIVE
PAPP-A MOM SERPL: 0.79
PAPP-A SERPL-MCNC: 371.4 NG/ML
SMOKING STATUS FTND: YES
TS 18 RISK FETUS: NORMAL
TS 21 RISK FETUS: NORMAL
U ESTRIOL MOM SERPL: 1
U ESTRIOL SERPL-MCNC: 1.47 NG/ML

## 2020-02-26 ENCOUNTER — PATIENT MESSAGE (OUTPATIENT)
Dept: OBSTETRICS AND GYNECOLOGY | Facility: CLINIC | Age: 25
End: 2020-02-26

## 2020-02-26 ENCOUNTER — PROCEDURE VISIT (OUTPATIENT)
Dept: MATERNAL FETAL MEDICINE | Facility: CLINIC | Age: 25
End: 2020-02-26

## 2020-02-26 ENCOUNTER — INITIAL CONSULT (OUTPATIENT)
Dept: MATERNAL FETAL MEDICINE | Facility: CLINIC | Age: 25
End: 2020-02-26
Payer: MEDICAID

## 2020-02-26 VITALS
BODY MASS INDEX: 25.83 KG/M2 | DIASTOLIC BLOOD PRESSURE: 54 MMHG | WEIGHT: 155.19 LBS | SYSTOLIC BLOOD PRESSURE: 114 MMHG

## 2020-02-26 DIAGNOSIS — Z36.89 ENCOUNTER FOR FETAL ANATOMIC SURVEY: ICD-10-CM

## 2020-02-26 DIAGNOSIS — G45.9 TIA (TRANSIENT ISCHEMIC ATTACK): ICD-10-CM

## 2020-02-26 PROCEDURE — 99999 PR PBB SHADOW E&M-EST. PATIENT-LVL III: CPT | Mod: PBBFAC,,, | Performed by: OBSTETRICS & GYNECOLOGY

## 2020-02-26 PROCEDURE — 76811 OB US DETAILED SNGL FETUS: CPT | Mod: 26,S$PBB,, | Performed by: OBSTETRICS & GYNECOLOGY

## 2020-02-26 PROCEDURE — 99215 OFFICE O/P EST HI 40 MIN: CPT | Mod: S$PBB,25,TH, | Performed by: OBSTETRICS & GYNECOLOGY

## 2020-02-26 PROCEDURE — 99215 PR OFFICE/OUTPT VISIT, EST, LEVL V, 40-54 MIN: ICD-10-PCS | Mod: S$PBB,25,TH, | Performed by: OBSTETRICS & GYNECOLOGY

## 2020-02-26 PROCEDURE — 76817 PR US, OB, TRANSVAG APPROACH: ICD-10-PCS | Mod: 26,S$PBB,, | Performed by: OBSTETRICS & GYNECOLOGY

## 2020-02-26 PROCEDURE — 76811 OB US DETAILED SNGL FETUS: CPT | Mod: PBBFAC | Performed by: OBSTETRICS & GYNECOLOGY

## 2020-02-26 PROCEDURE — 76817 TRANSVAGINAL US OBSTETRIC: CPT | Mod: 26,S$PBB,, | Performed by: OBSTETRICS & GYNECOLOGY

## 2020-02-26 PROCEDURE — 76811 PR US, OB FETAL EVAL & EXAM, TRANSABDOM,FIRST GESTATION: ICD-10-PCS | Mod: 26,S$PBB,, | Performed by: OBSTETRICS & GYNECOLOGY

## 2020-02-26 PROCEDURE — 76817 TRANSVAGINAL US OBSTETRIC: CPT | Mod: PBBFAC | Performed by: OBSTETRICS & GYNECOLOGY

## 2020-02-26 PROCEDURE — 99999 PR PBB SHADOW E&M-EST. PATIENT-LVL III: ICD-10-PCS | Mod: PBBFAC,,, | Performed by: OBSTETRICS & GYNECOLOGY

## 2020-02-26 PROCEDURE — 99213 OFFICE O/P EST LOW 20 MIN: CPT | Mod: PBBFAC,25 | Performed by: OBSTETRICS & GYNECOLOGY

## 2020-02-26 RX ORDER — ASPIRIN 81 MG/1
81 TABLET ORAL DAILY
Qty: 150 TABLET | Refills: 2 | Status: ON HOLD | OUTPATIENT
Start: 2020-02-26 | End: 2020-07-12 | Stop reason: HOSPADM

## 2020-02-26 RX ORDER — FOLIC ACID 1 MG/1
4 TABLET ORAL DAILY
Qty: 150 TABLET | Refills: 3 | Status: ON HOLD | OUTPATIENT
Start: 2020-02-26 | End: 2020-07-12 | Stop reason: HOSPADM

## 2020-02-26 NOTE — LETTER
March 2, 2020      Zoila Donaldson MD  [address] [facility] [address]           [facility] [address]  [address]  [facility] [address]  Phone: [phone]          Patient: Murali Tarango   MR Number: [id number]   YOB: 1995   Date of Visit: 2/26/2020       Dear Dr. Zoila Donaldson:    Thank you for referring Murali Tarango to me for evaluation. Attached you will find relevant portions of my assessment and plan of care.    If you have questions, please do not hesitate to call me. I look forward to following Murali Tarango along with you.    Sincerely,    Caryl Hartman MD    Enclosure  CC:  No Recipients    If you would like to receive this communication electronically, please contact [email or web address] or [phone] to request more information on [employer] Link access.    For providers and/or their staff who would like to refer a patient to [facility], please contact us through our one-stop-shop provider referral line, [facility], at [phone].    If you feel you have received this communication in error or would no longer like to receive these types of communications, please e-mail [email or web address]

## 2020-03-02 NOTE — PROGRESS NOTES
Indication  ========    Consultation/ Anatomy: hx of stroke    History  ======    General History  Smoking: yes  Other: Occasionally smokes  Previous Outcomes   3  Para 2  Preg. no. 1  Outcome: live YOB: 2016  Gest. age 37 w + 4 d  Gender: male  Details: C/S; 6 lbs 5 oz  Preg. no. 2  Outcome: live YOB: 2017  Gest. age 37 w + 5 d  Gender: male  Details: Repeat C/S; 6 lbs 7 oz  Risk Factors  Details: Sickle Cell Trait  Details: Hx of Blood Transfusion  Details: Hx of Stroke in   Details: Eye Surgery     Maternal Assessment  =================    Weight 70 kg  Weight (lb) 154 lb  BP syst 114 mmHg  BP diast 54 mmHg    Method  ======    Transabdominal ultrasound examination, Transvaginal ultrasound examination, Voluson E10. 2D Color Doppler. View: Suboptimal view: limited  by fetal position    Pregnancy  =========    Cardenas pregnancy. Number of fetuses: 1    Dating  ======    LMP on: 10/16/2019  GA by LMP 19 w + 0 d  BECKY by LMP: 2020  Ultrasound examination on: 2020  GA by U/S based upon: AC, BPD, Femur, HC  GA by U/S 19 w + 1 d  BECKY by U/S: 2020  Assigned: based on ultrasound (CRL), selected on 2019  Assigned GA 19 w + 0 d  Assigned BECKY: 2020  Pregnancy length 280 d    General Evaluation  ==============    Cardiac activity present.  bpm.  Fetal movements visualized.  Presentation cephalic.  Placenta Placental site: posterior. Placental edge-to-cervical os distance 3 mm.  Umbilical cord normal, 3 vessel cord, marginal insertion.  Amniotic fluid normal amount.    Fetal Biometry  ============    Standard  BPD 40.6 mm  18w 2d                Hadlock    OFD 57.3 mm  20w 1d                Clarisse    .7 mm  18w 5d                Hadlock    Cerebellum tr 18.5 mm  18w 5d                Sanford    Nuchal fold 5.3 mm  .1 mm  20w 1d                Hadlock    Femur 30.2 mm  19w 2d                Hadlock    Humerus 29.6 mm  19w 5d                 Clarisse    HC / AC 1.06     g    EFW (lb) 0 lb  EFW (oz) 11 oz  EFW by: Hadlock (BPD-HC-AC-FL)  Extended   5.9 mm  CM 5.7 mm    Head / Face / Neck  Cephalic index 0.71    Extremities / Bony Struc  FL / BPD 0.74    FL / HC 0.19    FL / AC 0.20    Other Structures   bpm    Fetal Anatomy  ============    Cranium: normal  Lateral ventricles: normal  Choroid plexus: normal  Midline falx: normal  Cavum septi pellucidi: suboptimal  Cerebellum: normal  Cisterna magna: normal  Head / Neck  Nuchal fold: normal  Lips: normal  Profile: normal  Nose: normal  4-chamber view: normal  RVOT view: normal  LVOT view: normal  Heart / Thorax  Situs: situs solitus (normal)  Aortic arch view: normal  Ductal arch view: normal  SVC: normal  IVC: normal  3-vessel view: normal  3-vessel-trachea view: normal  Rt lung: normal  Lt lung: normal  Diaphragm: suboptimal  Cord insertion: normal  Stomach: normal  Kidneys: normal  Bladder: normal  Genitals: normal  Abdomen  Liver: normal  Bowel: normal  Rt renal artery: visualized  Lt renal artery: visualized  Cervical spine: normal  Thoracic spine: normal  Lumbar spine: suboptimal  Sacral spine: suboptimal  Arms: both visualized  Legs: both visualized  Rt hand: visualized  Lt hand: visualized  Rt foot: suboptimal  Lt foot: visualized  Wants to know gender: no    Maternal Structures  ===============    Uterus / Cervix  Uterus: Visualized  Fibroids: Fibroids identified  Uterine fibroid D1 53 mm  Uterine fibroid D2 35 mm  Uterine fibroid D3 36 mm  Uterine fibroid mean 41.3 mm  Uterine fibroid vol 34.966 cm³  Uterine fibroids findings: anterior, JIA  Cervical length 36.6 mm  Ovaries / Tubes / Adnexa  Rt ovary: Visualized  Lt ovary: Visualized  Lt ovary D1 26 mm  Lt ovary D2 23 mm  Lt ovary D3 16 mm  Lt ovary Vol 5.0 cm³    Consultation  ==========    Consultation for Hg SC disease, h/o CVA, ITP    70.4  114/54      Meds:  1. Lovenox 60 mg daily  2. Folic acid  3. 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 Glynn, 37 weeks    Saw heme/onc last month. They recommend echo, hydration, and CBC q month    Counseled on reproductive genetics. Patient states none of her children have trait or disease. She reports her partner is negative for sickle cell.  His electrophoresis is not available for review. She was advised to clarify whether he has Hg AA. Reviewed that with Hg SC, if he is Hg AA, 1/2  of her children will have trait (Hg AS) and half will have Hg AC-which is asymptomatic, but should be taken into consideration once her children  are of reproductive age. Patient was counseled in regards to Hg SC disease in pregnancy. She was counseled on risk of crises and  management during pregnancy.    Counseling:  With hemoglobin SC disease the patient is doubly heterozygote for Hb S and Hb C. Usually patients with Hb SC disease have a normal body  habitus, health childhood and a normal lifespan. At the beginning of pregnancy women are usually mildly anemic and there may be  splenomegaly. As the pregnancy progresses these patients are at risk for severe anemic crises from splenic sequestration. These patients  have the potential to have the clinical manifestations similar to Sickle cell anemia but milder. The management is very similar. Considerations  for the management of labor are similar to sickle cell anemia patients. These patients are at risk for antepartum admissions, fetal growth  restriction, and postpartum infections. Rates of pain crises, acute chest syndrome, and urinary tract  infections are very similar to sickle cell  anemia patients.    Recommendations/follow up:  1. Serial US q 4-6 weeks for fetal growth/MFM visits for maternal well-being    2. Advised patient to clarify partner's hemoglobin electrophoresis results    3. s/p heme/onc consult-please see detailed note and recommendations from Dr. Shankar; continue with follow-up with Dr. Shankar    4. Maternal echo is recommended (and has been ordered by primary OB)    5. Urinalysis and culture (gold standard is via catheter, but mid stream is an alternative) every trimester and more frequent as indicated  secondary to Hb SC disease    6. CBC every month per heme/onc    7. Patient to be counseled to come in for evaluation if signs/symptoms of respiratory difficulty, urinary tract symptoms in particular    8. If frequent pain crises-delivery 37-38 weeks. Primary OB should discuss contraception given 2 prior  deliveries.    9. Patient counseled to start 4 mg folic acid, aspirin 81 mg    10. Twice weekly antepartum testing at 32 weeks-to be ordered by primary OB.    11. Complete APLS profile (B2 GP, LAC) with next lab draw (3/3/2020).    Low-lying placenta-repeat placenta location at 28-30 weeks.    VALENTINA Arthur spent 40 minutes on the consultation today with the patient. More than 50% of the consultation was spent in face-to-face counseling and coordination of care.    Impression  =========    A detailed fetal anatomic ultrasound examination was performed for the following high risk indication: maternal hemoglobinopathy. No fetal  structural malformations are identified; however, fetal imaging is incomplete today. A follow-up study will be scheduled to complete the fetal  anatomic survey. Fetal size today is consistent with established gestational age. Cervical length is normal. Placental location is posterior and  low-lying.  Uterine fibroids, as above.    Recommendation  ==============    Patient advised to get flu vaccine with primary  OB.    See above. Complete APLS profile with next lab draw (3/3/2020).    Start aspirin 81 mg and folic acid 4 mg.    F/u in 4 weeks with MFM/growth/follow-up anatomy survey    Repeat ultrasound at 28-30 weeks for placenta location.

## 2020-03-03 ENCOUNTER — LAB VISIT (OUTPATIENT)
Dept: LAB | Facility: OTHER | Age: 25
End: 2020-03-03
Attending: OBSTETRICS & GYNECOLOGY
Payer: MEDICAID

## 2020-03-03 ENCOUNTER — ROUTINE PRENATAL (OUTPATIENT)
Dept: OBSTETRICS AND GYNECOLOGY | Facility: CLINIC | Age: 25
End: 2020-03-03
Payer: MEDICAID

## 2020-03-03 VITALS
SYSTOLIC BLOOD PRESSURE: 112 MMHG | BODY MASS INDEX: 26.01 KG/M2 | DIASTOLIC BLOOD PRESSURE: 72 MMHG | WEIGHT: 156.31 LBS

## 2020-03-03 DIAGNOSIS — Z98.891 HISTORY OF CESAREAN DELIVERY: ICD-10-CM

## 2020-03-03 DIAGNOSIS — Z3A.19 19 WEEKS GESTATION OF PREGNANCY: ICD-10-CM

## 2020-03-03 DIAGNOSIS — Z3A.19 19 WEEKS GESTATION OF PREGNANCY: Primary | ICD-10-CM

## 2020-03-03 PROCEDURE — 86146 BETA-2 GLYCOPROTEIN ANTIBODY: CPT | Mod: 59

## 2020-03-03 PROCEDURE — 99999 PR PBB SHADOW E&M-EST. PATIENT-LVL III: CPT | Mod: PBBFAC,,, | Performed by: OBSTETRICS & GYNECOLOGY

## 2020-03-03 PROCEDURE — 85613 RUSSELL VIPER VENOM DILUTED: CPT

## 2020-03-03 PROCEDURE — 99213 OFFICE O/P EST LOW 20 MIN: CPT | Mod: PBBFAC | Performed by: OBSTETRICS & GYNECOLOGY

## 2020-03-03 PROCEDURE — 99213 OFFICE O/P EST LOW 20 MIN: CPT | Mod: S$PBB,TH,, | Performed by: OBSTETRICS & GYNECOLOGY

## 2020-03-03 PROCEDURE — 99213 PR OFFICE/OUTPT VISIT, EST, LEVL III, 20-29 MIN: ICD-10-PCS | Mod: S$PBB,TH,, | Performed by: OBSTETRICS & GYNECOLOGY

## 2020-03-03 PROCEDURE — 99999 PR PBB SHADOW E&M-EST. PATIENT-LVL III: ICD-10-PCS | Mod: PBBFAC,,, | Performed by: OBSTETRICS & GYNECOLOGY

## 2020-03-03 NOTE — PROGRESS NOTES
"Doing well today. No OB concerns.    Problem Noted   Coats' Disease of Left Eye 2019    Reviewed records from [facility] by Dr. Swain.  She has a history of coats disease and serous retinal detachment. Underwent PRP and avastin ()  Lase sx for presumed sickle cell retinopathy   Then underwent AGV (Ahmed Glaucoma Valve) in   In , left evisceration, left removal of glaucoma valve, left frost suture, insertion of acrylic implant     #Referral to opthamology placed  #Lovenox in pregnancy, needs ASA daily postpartum      History of  Delivery 2019    Initial  delivery at 37 weeks (induced for SC disease), underwent CD for category 2 tracing    G1 - [facility] (14) - C/S - emergency for FHR abnormalities. She was 4-5 cm.  G2 - [facility] (12/30/15) - C/S - elective repeat C/S given close interval pregnancy      Did Nexplanon before and Mirena - conceived with both. Considering tubal ligation. Needs medicaid consents.      Thrombocytopenia 2014    Per prior documentation, this is secondary to ITP    Per heme/onc, she has h/o thrombocytopenia of unclear etiology. She has no bleeding history.     #Monthly CBC while on lovenox  HIV Neg, Hep B Neg, Hep C neg, folate and B12 normal   She will have monthly monitoring of CBC while on Lovenox.                Hemoglobin S-C Disease 2014    Per [facility] prior consult note from prior pregnancy: These patients have the potential to have the clinical manifestations similar to Sickle cell anemia but milder. The management is very similar. Considerations for the management of labor are similar to sickle cell anemia patients. These patients are at risk for antepartum admissions, fetal growth restriction, and postpartum infections.  Rates of pain crises, acute chest syndrome, and urinary tract infections are very similar to sickle cell anemia patients.  Seen by genetics in prior pregnancy.   FOB had negative testing.  Has documented "infarct to " "left femoral head on Xray" (2019)     #Referral to hematology here at [facility]. Followed by heme/onc in [place].  #urine culture q trimester  #folic acid 4 mg daily  Up to date with pneumo, meningococcaland hib vaccinations.       Per heme/onc  - Importance of hydration. She will be scheduled for hydration twice weekly, as needed, in view of ongoing emesis and nausea  - Needs echocardiogram, ordered and scheduled today  --Screening for pulmonary hypertension with echocardiography is needed, as incidence of pulmonary hypertension is increased in patients with SCD and is associated with increased mortality.   She is not sure if she has had 2D ECHO done previously  -Blood pressure and urinalysis should be performed regularly in pregnancy. Mid stream urine culture is recomended monthly  - Iron studies ordered by heme/onc If iron overloaded, cardiac MRI is indicated.   -In view of TIA history, recommend LMWH @ 60 mg sub q once daily through her pregnancy.   -Continue PNV and folic acid               RTO in 4 weeks.   1 hour GTT and CBC next visit.  Reviewed contraception options. Considering tubal ligation. Will sign medicaid consent at next visit.  Reviewed risks/benefits of repeat  delivery vs TOLAC. Only TOLAC if spontaneous labor.  Flu shot today.  She will start ASA now.    Zoila Donaldson MD  Obstetrics and Gynecology  [facility]    "

## 2020-03-05 LAB — LA PPP-IMP: NEGATIVE

## 2020-03-07 LAB
B2 GLYCOPROT1 IGA SER QL: <9 SAU
B2 GLYCOPROT1 IGG SER QL: <9 SGU
B2 GLYCOPROT1 IGM SER QL: <9 SMU

## 2020-03-16 ENCOUNTER — PATIENT MESSAGE (OUTPATIENT)
Dept: OPTOMETRY | Facility: CLINIC | Age: 25
End: 2020-03-16

## 2020-03-16 ENCOUNTER — HOSPITAL ENCOUNTER (OUTPATIENT)
Dept: CARDIOLOGY | Facility: OTHER | Age: 25
Discharge: HOME | End: 2020-03-16
Attending: OBSTETRICS & GYNECOLOGY
Payer: MEDICAID

## 2020-03-16 ENCOUNTER — TELEPHONE (OUTPATIENT)
Dept: OPTOMETRY | Facility: CLINIC | Age: 25
End: 2020-03-16

## 2020-03-16 VITALS
WEIGHT: 156 LBS | HEIGHT: 65 IN | DIASTOLIC BLOOD PRESSURE: 54 MMHG | HEART RATE: 80 BPM | SYSTOLIC BLOOD PRESSURE: 110 MMHG | BODY MASS INDEX: 25.99 KG/M2

## 2020-03-16 DIAGNOSIS — D69.6 THROMBOCYTOPENIA: ICD-10-CM

## 2020-03-16 PROCEDURE — 93306 TTE W/DOPPLER COMPLETE: CPT | Mod: 26,,, | Performed by: INTERNAL MEDICINE

## 2020-03-16 PROCEDURE — 93306 TTE W/DOPPLER COMPLETE: CPT

## 2020-03-16 PROCEDURE — 93306 ECHO (CUPID ONLY): ICD-10-PCS | Mod: 26,,, | Performed by: INTERNAL MEDICINE

## 2020-03-17 ENCOUNTER — PATIENT MESSAGE (OUTPATIENT)
Dept: OBSTETRICS AND GYNECOLOGY | Facility: CLINIC | Age: 25
End: 2020-03-17

## 2020-03-17 LAB
AORTIC ROOT ANNULUS: 2.28 CM
AORTIC VALVE CUSP SEPERATION: 1.74 CM
ASCENDING AORTA: 2.46 CM
AV INDEX (PROSTH): 1.18
AV MEAN GRADIENT: 6 MMHG
AV PEAK GRADIENT: 8 MMHG
AV VALVE AREA: 3.59 CM2
AV VELOCITY RATIO: 0.9
BSA FOR ECHO PROCEDURE: 1.8 M2
CV ECHO LV RWT: 0.3 CM
DOP CALC AO PEAK VEL: 1.44 M/S
DOP CALC AO VTI: 26.15 CM
DOP CALC LVOT AREA: 3 CM2
DOP CALC LVOT DIAMETER: 1.97 CM
DOP CALC LVOT PEAK VEL: 1.3 M/S
DOP CALC LVOT STROKE VOLUME: 93.98 CM3
DOP CALCLVOT PEAK VEL VTI: 30.85 CM
E WAVE DECELERATION TIME: 129.24 MSEC
E/A RATIO: 1.25
E/E' RATIO: 5.45 M/S
ECHO LV POSTERIOR WALL: 0.82 CM (ref 0.6–1.1)
FRACTIONAL SHORTENING: 37 % (ref 28–44)
INTERVENTRICULAR SEPTUM: 0.77 CM (ref 0.6–1.1)
LA MAJOR: 4.35 CM
LA MINOR: 3.85 CM
LA WIDTH: 2.86 CM
LEFT ATRIUM SIZE: 2.91 CM
LEFT ATRIUM VOLUME INDEX: 16.2 ML/M2
LEFT ATRIUM VOLUME: 28.9 CM3
LEFT INTERNAL DIMENSION IN SYSTOLE: 3.43 CM (ref 2.1–4)
LEFT VENTRICLE DIASTOLIC VOLUME INDEX: 81.49 ML/M2
LEFT VENTRICLE DIASTOLIC VOLUME: 145.05 ML
LEFT VENTRICLE MASS INDEX: 88 G/M2
LEFT VENTRICLE SYSTOLIC VOLUME INDEX: 27.2 ML/M2
LEFT VENTRICLE SYSTOLIC VOLUME: 48.48 ML
LEFT VENTRICULAR INTERNAL DIMENSION IN DIASTOLE: 5.46 CM (ref 3.5–6)
LEFT VENTRICULAR MASS: 156.71 G
LV LATERAL E/E' RATIO: 4.94 M/S
LV SEPTAL E/E' RATIO: 6.08 M/S
MV PEAK A VEL: 0.63 M/S
MV PEAK E VEL: 0.79 M/S
PISA TR MAX VEL: 2.05 M/S
RA MAJOR: 4.34 CM
RA WIDTH: 2.74 CM
SINUS: 2.39 CM
STJ: 2.41 CM
TDI LATERAL: 0.16 M/S
TDI SEPTAL: 0.13 M/S
TDI: 0.15 M/S
TR MAX PG: 17 MMHG
TRICUSPID ANNULAR PLANE SYSTOLIC EXCURSION: 3.2 CM

## 2020-03-21 ENCOUNTER — PATIENT MESSAGE (OUTPATIENT)
Dept: OBSTETRICS AND GYNECOLOGY | Facility: CLINIC | Age: 25
End: 2020-03-21

## 2020-03-26 ENCOUNTER — INITIAL CONSULT (OUTPATIENT)
Dept: MATERNAL FETAL MEDICINE | Facility: CLINIC | Age: 25
End: 2020-03-26
Payer: MEDICAID

## 2020-03-26 ENCOUNTER — PROCEDURE VISIT (OUTPATIENT)
Dept: MATERNAL FETAL MEDICINE | Facility: CLINIC | Age: 25
End: 2020-03-26
Payer: MEDICAID

## 2020-03-26 ENCOUNTER — TELEPHONE (OUTPATIENT)
Dept: MATERNAL FETAL MEDICINE | Facility: CLINIC | Age: 25
End: 2020-03-26

## 2020-03-26 VITALS
BODY MASS INDEX: 27.07 KG/M2 | SYSTOLIC BLOOD PRESSURE: 122 MMHG | WEIGHT: 162.5 LBS | HEIGHT: 65 IN | DIASTOLIC BLOOD PRESSURE: 70 MMHG

## 2020-03-26 DIAGNOSIS — D57.20 SICKLE CELL-HEMOGLOBIN C DISEASE WITHOUT CRISIS: ICD-10-CM

## 2020-03-26 DIAGNOSIS — D57.20 SICKLE CELL-HEMOGLOBIN C DISEASE WITHOUT CRISIS: Primary | ICD-10-CM

## 2020-03-26 DIAGNOSIS — Z36.89 ENCOUNTER FOR ULTRASOUND TO CHECK FETAL GROWTH: Primary | ICD-10-CM

## 2020-03-26 PROCEDURE — 76816 OB US FOLLOW-UP PER FETUS: CPT | Mod: 26,S$PBB,, | Performed by: OBSTETRICS & GYNECOLOGY

## 2020-03-26 PROCEDURE — 76816 OB US FOLLOW-UP PER FETUS: CPT | Mod: PBBFAC | Performed by: OBSTETRICS & GYNECOLOGY

## 2020-03-26 PROCEDURE — 99214 OFFICE O/P EST MOD 30 MIN: CPT | Mod: S$PBB,25,TH, | Performed by: OBSTETRICS & GYNECOLOGY

## 2020-03-26 PROCEDURE — 99999 PR PBB SHADOW E&M-EST. PATIENT-LVL I: ICD-10-PCS | Mod: PBBFAC,,, | Performed by: OBSTETRICS & GYNECOLOGY

## 2020-03-26 PROCEDURE — 99211 OFF/OP EST MAY X REQ PHY/QHP: CPT | Mod: PBBFAC | Performed by: OBSTETRICS & GYNECOLOGY

## 2020-03-26 PROCEDURE — 99214 PR OFFICE/OUTPT VISIT, EST, LEVL IV, 30-39 MIN: ICD-10-PCS | Mod: S$PBB,25,TH, | Performed by: OBSTETRICS & GYNECOLOGY

## 2020-03-26 PROCEDURE — 76816 PR  US,PREGNANT UTERUS,F/U,TRANSABD APP: ICD-10-PCS | Mod: 26,S$PBB,, | Performed by: OBSTETRICS & GYNECOLOGY

## 2020-03-26 PROCEDURE — 99999 PR PBB SHADOW E&M-EST. PATIENT-LVL I: CPT | Mod: PBBFAC,,, | Performed by: OBSTETRICS & GYNECOLOGY

## 2020-03-26 NOTE — TELEPHONE ENCOUNTER
Spoke with pt this morning about appt  Pt stated that her car is at the dealership  She should be able to get to us by 12:30  Advised pt to call us back at 11 and we can see about working her in for 12:30 or we can reschedule her

## 2020-04-02 ENCOUNTER — TELEPHONE (OUTPATIENT)
Dept: HEMATOLOGY/ONCOLOGY | Facility: CLINIC | Age: 25
End: 2020-04-02

## 2020-04-06 ENCOUNTER — TELEPHONE (OUTPATIENT)
Dept: HEMATOLOGY/ONCOLOGY | Facility: CLINIC | Age: 25
End: 2020-04-06

## 2020-04-08 ENCOUNTER — PATIENT MESSAGE (OUTPATIENT)
Dept: OBSTETRICS AND GYNECOLOGY | Facility: CLINIC | Age: 25
End: 2020-04-08

## 2020-04-15 ENCOUNTER — TELEPHONE (OUTPATIENT)
Dept: OBSTETRICS AND GYNECOLOGY | Facility: CLINIC | Age: 25
End: 2020-04-15

## 2020-04-15 NOTE — TELEPHONE ENCOUNTER
----- Message from Zoila Donaldson MD sent at 4/15/2020  1:29 PM CDT -----  She missed her appointment yesterday. Can we reschedule her for tomorrow or Friday if she can? Thanks!

## 2020-04-30 ENCOUNTER — PROCEDURE VISIT (OUTPATIENT)
Dept: MATERNAL FETAL MEDICINE | Facility: CLINIC | Age: 25
End: 2020-04-30
Payer: MEDICAID

## 2020-04-30 ENCOUNTER — TELEPHONE (OUTPATIENT)
Dept: HEMATOLOGY/ONCOLOGY | Facility: CLINIC | Age: 25
End: 2020-04-30

## 2020-04-30 ENCOUNTER — INITIAL CONSULT (OUTPATIENT)
Dept: MATERNAL FETAL MEDICINE | Facility: CLINIC | Age: 25
End: 2020-04-30
Payer: MEDICAID

## 2020-04-30 ENCOUNTER — LAB VISIT (OUTPATIENT)
Dept: LAB | Facility: OTHER | Age: 25
End: 2020-04-30
Attending: OBSTETRICS & GYNECOLOGY
Payer: MEDICAID

## 2020-04-30 VITALS — BODY MASS INDEX: 27.29 KG/M2 | WEIGHT: 164 LBS | SYSTOLIC BLOOD PRESSURE: 112 MMHG | DIASTOLIC BLOOD PRESSURE: 64 MMHG

## 2020-04-30 DIAGNOSIS — D57.219 SICKLE-CELL-HEMOGLOBIN C DISEASE WITH CRISIS: Primary | ICD-10-CM

## 2020-04-30 DIAGNOSIS — Z36.89 ENCOUNTER FOR ULTRASOUND TO CHECK FETAL GROWTH: ICD-10-CM

## 2020-04-30 DIAGNOSIS — Z3A.19 19 WEEKS GESTATION OF PREGNANCY: ICD-10-CM

## 2020-04-30 DIAGNOSIS — D57.20 SICKLE CELL-HEMOGLOBIN C DISEASE WITHOUT CRISIS: ICD-10-CM

## 2020-04-30 DIAGNOSIS — D63.8 ANEMIA DUE TO CHRONIC ILLNESS: ICD-10-CM

## 2020-04-30 DIAGNOSIS — D69.6 THROMBOCYTOPENIA: ICD-10-CM

## 2020-04-30 DIAGNOSIS — Z36.89 ENCOUNTER FOR ULTRASOUND TO ASSESS FETAL GROWTH: Primary | ICD-10-CM

## 2020-04-30 DIAGNOSIS — D57.219 SICKLE-CELL-HEMOGLOBIN C DISEASE WITH CRISIS: ICD-10-CM

## 2020-04-30 LAB
ALBUMIN SERPL BCP-MCNC: 3.2 G/DL (ref 3.5–5.2)
ALP SERPL-CCNC: 60 U/L (ref 55–135)
ALT SERPL W/O P-5'-P-CCNC: 5 U/L (ref 10–44)
ANION GAP SERPL CALC-SCNC: 10 MMOL/L (ref 8–16)
ANISOCYTOSIS BLD QL SMEAR: SLIGHT
AST SERPL-CCNC: 17 U/L (ref 10–40)
BASOPHILS # BLD AUTO: 0.01 K/UL (ref 0–0.2)
BASOPHILS NFR BLD: 0.2 % (ref 0–1.9)
BILIRUB SERPL-MCNC: 0.8 MG/DL (ref 0.1–1)
BUN SERPL-MCNC: 6 MG/DL (ref 6–20)
CALCIUM SERPL-MCNC: 8.5 MG/DL (ref 8.7–10.5)
CHLORIDE SERPL-SCNC: 106 MMOL/L (ref 95–110)
CO2 SERPL-SCNC: 21 MMOL/L (ref 23–29)
CREAT SERPL-MCNC: 0.7 MG/DL (ref 0.5–1.4)
DIFFERENTIAL METHOD: ABNORMAL
DOHLE BOD BLD QL SMEAR: PRESENT
EOSINOPHIL # BLD AUTO: 0 K/UL (ref 0–0.5)
EOSINOPHIL NFR BLD: 0.2 % (ref 0–8)
ERYTHROCYTE [DISTWIDTH] IN BLOOD BY AUTOMATED COUNT: 15.7 % (ref 11.5–14.5)
ERYTHROCYTE [DISTWIDTH] IN BLOOD BY AUTOMATED COUNT: 15.7 % (ref 11.5–14.5)
EST. GFR  (AFRICAN AMERICAN): >60 ML/MIN/1.73 M^2
EST. GFR  (NON AFRICAN AMERICAN): >60 ML/MIN/1.73 M^2
GLUCOSE SERPL-MCNC: 89 MG/DL (ref 70–140)
GLUCOSE SERPL-MCNC: 91 MG/DL (ref 70–110)
HCT VFR BLD AUTO: 25.8 % (ref 37–48.5)
HCT VFR BLD AUTO: 25.8 % (ref 37–48.5)
HGB BLD-MCNC: 8.9 G/DL (ref 12–16)
HGB BLD-MCNC: 8.9 G/DL (ref 12–16)
IMM GRANULOCYTES # BLD AUTO: 0.03 K/UL (ref 0–0.04)
IMM GRANULOCYTES NFR BLD AUTO: 0.5 % (ref 0–0.5)
LYMPHOCYTES # BLD AUTO: 0.7 K/UL (ref 1–4.8)
LYMPHOCYTES NFR BLD: 12.6 % (ref 18–48)
MCH RBC QN AUTO: 30.2 PG (ref 27–31)
MCH RBC QN AUTO: 30.2 PG (ref 27–31)
MCHC RBC AUTO-ENTMCNC: 34.5 G/DL (ref 32–36)
MCHC RBC AUTO-ENTMCNC: 34.5 G/DL (ref 32–36)
MCV RBC AUTO: 88 FL (ref 82–98)
MCV RBC AUTO: 88 FL (ref 82–98)
MONOCYTES # BLD AUTO: 0.2 K/UL (ref 0.3–1)
MONOCYTES NFR BLD: 4.2 % (ref 4–15)
NEUTROPHILS # BLD AUTO: 4.7 K/UL (ref 1.8–7.7)
NEUTROPHILS NFR BLD: 82.3 % (ref 38–73)
NRBC BLD-RTO: 0 /100 WBC
PLATELET # BLD AUTO: 121 K/UL (ref 150–350)
PLATELET # BLD AUTO: 121 K/UL (ref 150–350)
PLATELET BLD QL SMEAR: ABNORMAL
PMV BLD AUTO: ABNORMAL FL (ref 9.2–12.9)
PMV BLD AUTO: ABNORMAL FL (ref 9.2–12.9)
POLYCHROMASIA BLD QL SMEAR: ABNORMAL
POTASSIUM SERPL-SCNC: 3.8 MMOL/L (ref 3.5–5.1)
PROT SERPL-MCNC: 6.8 G/DL (ref 6–8.4)
RBC # BLD AUTO: 2.95 M/UL (ref 4–5.4)
RBC # BLD AUTO: 2.95 M/UL (ref 4–5.4)
RETICS/RBC NFR AUTO: 5.5 % (ref 0.5–2.5)
SODIUM SERPL-SCNC: 137 MMOL/L (ref 136–145)
TOXIC GRANULES BLD QL SMEAR: PRESENT
WBC # BLD AUTO: 5.65 K/UL (ref 3.9–12.7)
WBC # BLD AUTO: 5.65 K/UL (ref 3.9–12.7)

## 2020-04-30 PROCEDURE — 76816 OB US FOLLOW-UP PER FETUS: CPT | Mod: 26,S$PBB,, | Performed by: OBSTETRICS & GYNECOLOGY

## 2020-04-30 PROCEDURE — 80053 COMPREHEN METABOLIC PANEL: CPT

## 2020-04-30 PROCEDURE — 85045 AUTOMATED RETICULOCYTE COUNT: CPT

## 2020-04-30 PROCEDURE — 99999 PR PBB SHADOW E&M-EST. PATIENT-LVL III: CPT | Mod: PBBFAC,,, | Performed by: OBSTETRICS & GYNECOLOGY

## 2020-04-30 PROCEDURE — 85025 COMPLETE CBC W/AUTO DIFF WBC: CPT

## 2020-04-30 PROCEDURE — 76816 PR  US,PREGNANT UTERUS,F/U,TRANSABD APP: ICD-10-PCS | Mod: 26,S$PBB,, | Performed by: OBSTETRICS & GYNECOLOGY

## 2020-04-30 PROCEDURE — 99213 PR OFFICE/OUTPT VISIT, EST, LEVL III, 20-29 MIN: ICD-10-PCS | Mod: S$PBB,25,TH, | Performed by: OBSTETRICS & GYNECOLOGY

## 2020-04-30 PROCEDURE — 76816 OB US FOLLOW-UP PER FETUS: CPT | Mod: PBBFAC | Performed by: OBSTETRICS & GYNECOLOGY

## 2020-04-30 PROCEDURE — 99213 OFFICE O/P EST LOW 20 MIN: CPT | Mod: PBBFAC | Performed by: OBSTETRICS & GYNECOLOGY

## 2020-04-30 PROCEDURE — 99999 PR PBB SHADOW E&M-EST. PATIENT-LVL III: ICD-10-PCS | Mod: PBBFAC,,, | Performed by: OBSTETRICS & GYNECOLOGY

## 2020-04-30 PROCEDURE — 82950 GLUCOSE TEST: CPT

## 2020-04-30 PROCEDURE — 36415 COLL VENOUS BLD VENIPUNCTURE: CPT

## 2020-04-30 PROCEDURE — 99213 OFFICE O/P EST LOW 20 MIN: CPT | Mod: S$PBB,25,TH, | Performed by: OBSTETRICS & GYNECOLOGY

## 2020-04-30 NOTE — TELEPHONE ENCOUNTER
24 y.o. female pt with hx of hemoglobin SC disease, currently pregnant was supposed to present today virtually for a routine f/u. Pt did not come for visit. Attempted two times to contact pt who did not answer.      Carrol Bateman DNP, NP  Hematology/Oncology

## 2020-04-30 NOTE — PROGRESS NOTES
Indication  ========    Follow-up evaluation for fetal growth    History  ======    General History  Smoking: yes  Other: Occasionally smokes  Previous Outcomes   3  Para 2  Preg. no. 1  Outcome: live YOB: 2016  Gest. age 37 w + 4 d  Gender: male  Details: C/S; 6 lbs 5 oz  Preg. no. 2  Outcome: live YOB: 2017  Gest. age 37 w + 5 d  Gender: male  Details: Repeat C/S; 6 lbs 7 oz  Risk Factors  Details: Sickle Cell Trait  Details: Hx of Blood Transfusion  Details: Hx of Stroke in   Details: Eye Surgery     Fetal Growth Overview  =================    Exam date        GA              BPD (mm)         HC (mm)        AC (mm)        FL (mm)         HL (mm)        EFW (g)  2020        19w 0d        40.6                  157.7             148.1             30.2              29.6               301  2020        23w 1d        56.8                  220.6             187.7             43.2                                   631    56%  2020        28w 1d        73.6                  270.5             244.0             53.7                                   1,275    53%      Method  ======    Transabdominal ultrasound examination, 2D Color Doppler, Voluson E10. View: Good view    Pregnancy  =========    Cardenas pregnancy. Number of fetuses: 1    Dating  ======    LMP on: 10/16/2019  GA by LMP 28 w + 1 d  BECKY by LMP: 2020  Ultrasound examination on: 2020  GA by U/S based upon: AC, BPD, Femur, HC  GA by U/S 29 w + 1 d  BECKY by U/S: 7/15/2020  Assigned: based on ultrasound (CRL), selected on 2019  Assigned GA 28 w + 1 d  Assigned BECKY: 2020  Pregnancy length 280 d    General Evaluation  ==============    Cardiac activity present.  bpm.  Fetal movements visualized.  Presentation cephalic.  Placenta Placental site: posterior.  Umbilical cord Cord vessels: 3 vessel cord.  Amniotic fluid MVP 6.3 cm.    Fetal Biometry  ============    Standard  BPD 73.6  mm  29w 4d                Hadlock    OFD 95.8 mm  31w 0d                Clarisse    .5 mm  29w 4d                Hadlock    .0 mm  28w 5d                Hadlock    Femur 53.7 mm  28w 3d                Hadlock    HC / AC 1.11    EFW 1,275 g          53%        Hansel    EFW (lb) 2 lb  EFW (oz) 13 oz  EFW by: Hadlock (BPD-HC-AC-FL)  Extended   3.6 mm  Head / Face / Neck  Cephalic index 0.77    Extremities / Bony Struc  FL / BPD 0.73    FL / HC 0.20    FL / AC 0.22    Other Structures   bpm    Fetal Anatomy  ============    Cranium: appears normal  4-chamber view: normal  Stomach: normal  Kidneys: normal  Bladder: normal  Wants to know gender: no  Other: A full anatomic survey has been previously performed.    Consultation  ==========    F/u Hemoglobin SC disease.    Doing well. +FM. Occasional BH. No s/sx of pain crisis.    Reviewed follow-up plans.    Discussed OB/ED precautions if c/f PTL, dec FM or sickle cell pain crisis.    Start PNT at 32 weeks.    F/u growth at 32 weeks; re-check placenta location. Discussed need for TV scan at that visit.    Advised patient to discuss TOLAC v. scheduled rC/S (2 prior) with primary OB.    Time  I overall spent approximately 15 minutes in face to face time with the patient, greater than 50% of which was in counseling and care  coordination.    Impression  =========    Fetal size is AGA with the EFW at the 53%.  Normal repeat limited fetal anatomic survey. AFV is normal.    Recommendation  ==============    F/u ultrasound with MFM in 4 weeks (fetal growth, TV scan for placenta location, BPP).  Primary OB should initiate twice weekly antepartum surveillance (BPP and NST) at 32 weeks.  Continue lovenox, aspirin, folic acid, prenatal vitamin.  See recommendations from initial consultation on 2/26/2020.    Message sent to primary OB to reschedule f/u.  Has heme/onc virtual f/u today.

## 2020-05-28 ENCOUNTER — INITIAL CONSULT (OUTPATIENT)
Dept: MATERNAL FETAL MEDICINE | Facility: CLINIC | Age: 25
End: 2020-05-28
Payer: MEDICAID

## 2020-05-28 ENCOUNTER — PROCEDURE VISIT (OUTPATIENT)
Dept: MATERNAL FETAL MEDICINE | Facility: CLINIC | Age: 25
End: 2020-05-28
Payer: MEDICAID

## 2020-05-28 VITALS — WEIGHT: 170.63 LBS | SYSTOLIC BLOOD PRESSURE: 100 MMHG | DIASTOLIC BLOOD PRESSURE: 58 MMHG | BODY MASS INDEX: 28.4 KG/M2

## 2020-05-28 DIAGNOSIS — Z36.89 ENCOUNTER FOR ULTRASOUND TO ASSESS FETAL GROWTH: ICD-10-CM

## 2020-05-28 DIAGNOSIS — D57.20 SICKLE CELL-HEMOGLOBIN C DISEASE WITHOUT CRISIS: ICD-10-CM

## 2020-05-28 PROCEDURE — 99213 OFFICE O/P EST LOW 20 MIN: CPT | Mod: PBBFAC | Performed by: OBSTETRICS & GYNECOLOGY

## 2020-05-28 PROCEDURE — 76816 OB US FOLLOW-UP PER FETUS: CPT | Mod: PBBFAC | Performed by: OBSTETRICS & GYNECOLOGY

## 2020-05-28 PROCEDURE — 99214 OFFICE O/P EST MOD 30 MIN: CPT | Mod: S$PBB,25,TH, | Performed by: OBSTETRICS & GYNECOLOGY

## 2020-05-28 PROCEDURE — 76817 PR US, OB, TRANSVAG APPROACH: ICD-10-PCS | Mod: 26,S$PBB,, | Performed by: OBSTETRICS & GYNECOLOGY

## 2020-05-28 PROCEDURE — 99999 PR PBB SHADOW E&M-EST. PATIENT-LVL III: ICD-10-PCS | Mod: PBBFAC,,, | Performed by: OBSTETRICS & GYNECOLOGY

## 2020-05-28 PROCEDURE — 76817 TRANSVAGINAL US OBSTETRIC: CPT | Mod: 26,S$PBB,, | Performed by: OBSTETRICS & GYNECOLOGY

## 2020-05-28 PROCEDURE — 99999 PR PBB SHADOW E&M-EST. PATIENT-LVL III: CPT | Mod: PBBFAC,,, | Performed by: OBSTETRICS & GYNECOLOGY

## 2020-05-28 PROCEDURE — 76816 PR  US,PREGNANT UTERUS,F/U,TRANSABD APP: ICD-10-PCS | Mod: 26,S$PBB,, | Performed by: OBSTETRICS & GYNECOLOGY

## 2020-05-28 PROCEDURE — 76816 OB US FOLLOW-UP PER FETUS: CPT | Mod: 26,S$PBB,, | Performed by: OBSTETRICS & GYNECOLOGY

## 2020-05-28 PROCEDURE — 99214 PR OFFICE/OUTPT VISIT, EST, LEVL IV, 30-39 MIN: ICD-10-PCS | Mod: S$PBB,25,TH, | Performed by: OBSTETRICS & GYNECOLOGY

## 2020-05-28 PROCEDURE — 76817 TRANSVAGINAL US OBSTETRIC: CPT | Mod: PBBFAC | Performed by: OBSTETRICS & GYNECOLOGY

## 2020-06-01 ENCOUNTER — HOSPITAL ENCOUNTER (OUTPATIENT)
Dept: PERINATAL CARE | Facility: OTHER | Age: 25
Discharge: HOME | End: 2020-06-01
Attending: OBSTETRICS & GYNECOLOGY
Payer: MEDICAID

## 2020-06-01 ENCOUNTER — OFFICE VISIT (OUTPATIENT)
Dept: OPTOMETRY | Facility: CLINIC | Age: 25
End: 2020-06-01
Payer: MEDICAID

## 2020-06-01 DIAGNOSIS — D57.1 MATERNAL SICKLE CELL ANEMIA COMPLICATING PREGNANCY IN SECOND TRIMESTER: ICD-10-CM

## 2020-06-01 DIAGNOSIS — O99.012 MATERNAL SICKLE CELL ANEMIA COMPLICATING PREGNANCY IN SECOND TRIMESTER: ICD-10-CM

## 2020-06-01 DIAGNOSIS — H35.022 COATS' DISEASE OF LEFT EYE: ICD-10-CM

## 2020-06-01 DIAGNOSIS — H54.40 BLINDNESS OF LEFT EYE WITH NORMAL VISION IN CONTRALATERAL EYE: ICD-10-CM

## 2020-06-01 DIAGNOSIS — H40.013 OAG (OPEN ANGLE GLAUCOMA) SUSPECT, LOW RISK, BILATERAL: ICD-10-CM

## 2020-06-01 DIAGNOSIS — D57.20 SICKLE CELL-HEMOGLOBIN C DISEASE WITHOUT CRISIS: ICD-10-CM

## 2020-06-01 DIAGNOSIS — H43.393 VISUAL FLOATERS, BILATERAL: Primary | ICD-10-CM

## 2020-06-01 DIAGNOSIS — Z86.69 HISTORY OF RETINAL DETACHMENT: ICD-10-CM

## 2020-06-01 DIAGNOSIS — H10.522 ANGULAR BLEPHAROCONJUNCTIVITIS OF LEFT EYE: ICD-10-CM

## 2020-06-01 DIAGNOSIS — D57.219 SICKLE-CELL-HEMOGLOBIN C DISEASE WITH CRISIS: ICD-10-CM

## 2020-06-01 DIAGNOSIS — H52.11 MYOPIA WITH ASTIGMATISM, RIGHT: ICD-10-CM

## 2020-06-01 DIAGNOSIS — H52.201 MYOPIA WITH ASTIGMATISM, RIGHT: ICD-10-CM

## 2020-06-01 DIAGNOSIS — G43.109 OCULAR MIGRAINE: ICD-10-CM

## 2020-06-01 DIAGNOSIS — Z97.0 PROSTHETIC EYE GLOBE: ICD-10-CM

## 2020-06-01 PROCEDURE — 92004 COMPRE OPH EXAM NEW PT 1/>: CPT | Mod: S$PBB,,, | Performed by: OPTOMETRIST

## 2020-06-01 PROCEDURE — 99999 PR PBB SHADOW E&M-EST. PATIENT-LVL II: ICD-10-PCS | Mod: PBBFAC,,, | Performed by: OPTOMETRIST

## 2020-06-01 PROCEDURE — 76815 OB US LIMITED FETUS(S): CPT

## 2020-06-01 PROCEDURE — 92015 DETERMINE REFRACTIVE STATE: CPT | Mod: ,,, | Performed by: OPTOMETRIST

## 2020-06-01 PROCEDURE — 99212 OFFICE O/P EST SF 10 MIN: CPT | Mod: PBBFAC,25 | Performed by: OPTOMETRIST

## 2020-06-01 PROCEDURE — 76815 OB US LIMITED FETUS(S): CPT | Mod: 26,,, | Performed by: OBSTETRICS & GYNECOLOGY

## 2020-06-01 PROCEDURE — 59025 PR FETAL 2N-STRESS TEST: ICD-10-PCS | Mod: 26,,, | Performed by: OBSTETRICS & GYNECOLOGY

## 2020-06-01 PROCEDURE — 76815 PR  US,PREGNANT UTERUS,LIMITED, 1/> FETUSES: ICD-10-PCS | Mod: 26,,, | Performed by: OBSTETRICS & GYNECOLOGY

## 2020-06-01 PROCEDURE — 99999 PR PBB SHADOW E&M-EST. PATIENT-LVL II: CPT | Mod: PBBFAC,,, | Performed by: OPTOMETRIST

## 2020-06-01 PROCEDURE — 92015 PR REFRACTION: ICD-10-PCS | Mod: ,,, | Performed by: OPTOMETRIST

## 2020-06-01 PROCEDURE — 59025 FETAL NON-STRESS TEST: CPT | Mod: 26,,, | Performed by: OBSTETRICS & GYNECOLOGY

## 2020-06-01 PROCEDURE — 59025 FETAL NON-STRESS TEST: CPT

## 2020-06-01 PROCEDURE — 92004 PR EYE EXAM, NEW PATIENT,COMPREHESV: ICD-10-PCS | Mod: S$PBB,,, | Performed by: OPTOMETRIST

## 2020-06-01 RX ORDER — TOBRAMYCIN 3 MG/ML
1 SOLUTION/ DROPS OPHTHALMIC 4 TIMES DAILY
Qty: 5 ML | Refills: 3 | Status: SHIPPED | OUTPATIENT
Start: 2020-06-01 | End: 2020-06-11

## 2020-06-01 NOTE — PROGRESS NOTES
HPI     ROXANA:6-7 months   Glasses? Yes   Contacts? no  H/o eye surgery, injections or laser:  laser treatment and sx 01/22/2012   OS   H/o eye injury: Stroke 2011 affected OS   Known eye conditions? RD OS Glaucoma OD   Family h/o eye conditions?   Eye gtts?H/o taking drops for glaucoma     (+) Flashes OD  (+) Floaters OD  (+) Mucous OS   (+) Tearing OS  (+) Itching OS  (-) Burning   (+) Headaches left side feels like its coming from OS , since last Tuesday    (+) Eye Pain/discomfort OD feels dry when  OS is tearing  (-) Irritation   (-) Redness (-) Double vision (-) Blurry vision    Diabetic? (-)  A1c?  (Hemoglobin A1C       Date                     Value               Ref Range             Status                01/03/2019               <4.0                4.0 - 5.6 %           Final              Comment:    ADA Screening Guidelines:  5.7-6.4%  Consistent with   prediabetes  >or=6.5%  Consistent with diabetes  High levels of fetal   hemoglobin interfere with the HbA1C  assay. Heterozygous hemoglobin   variants (HbS, HgC, etc)do  not significantly interfere with this assay.     However, presence of multiple variants may affect accuracy.    ----------)        Last edited by Azalea Wilhelm, OD on 6/1/2020 11:55 AM. (History)            Assessment /Plan     For exam results, see Encounter Report.    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 Longstanding. No e/o h/b/t 360 degrees OU. Monitor for worsening of symptoms or S/Sx of RD.   2. No e/o  retinopathy. Monitor.   3. (-) FHx. IOP 16 OD. C/d 0.65 OD. Educated pt on findings. H/o glaucoma drops. RTC 1mo IOP/Pachy/OCT/HVF.   4. Educated pt. Monitor.   5. SRx released to patient. Patient educated on lens options. Normal ocular health. RTC 1 year for routine exam.   6-7, 9-10. Per patient H/o stroke in OS of 2011. H/o retinal detachment OS. H/o coat's disease OS. H/o laser tx OS. Enucleation OS 2012.   8. Rx Tobramycin QID OS x 7 days. W/refills. Pt states she cleans prosthesis daily.

## 2020-06-01 NOTE — LETTER
June 1, 2020      Zoila Donaldson MD  [address] [facility] [address]           [facility]  [facility] [address]  Phone: [phone]  Fax: [phone]          Patient: Murali Tarango   MR Number: [id number]   YOB: 1995   Date of Visit: 6/1/2020       Dear Dr. Zoila Donaldson:    Thank you for referring Murali Tarango to me for evaluation. Attached you will find relevant portions of my assessment and plan of care.    If you have questions, please do not hesitate to call me. I look forward to following Murali Tarango along with you.    Sincerely,    Azalea Wilhelm, OD    Enclosure  CC:  No Recipients    If you would like to receive this communication electronically, please contact [email or web address] or [phone] to request more information on [employer] Link access.    For providers and/or their staff who would like to refer a patient to [facility], please contact us through our one-stop-shop provider referral line, [facility], at [phone].    If you feel you have received this communication in error or would no longer like to receive these types of communications, please e-mail [email or web address]

## 2020-06-04 ENCOUNTER — ROUTINE PRENATAL (OUTPATIENT)
Dept: OBSTETRICS AND GYNECOLOGY | Facility: CLINIC | Age: 25
End: 2020-06-04
Payer: MEDICAID

## 2020-06-04 ENCOUNTER — HOSPITAL ENCOUNTER (OUTPATIENT)
Dept: PERINATAL CARE | Facility: OTHER | Age: 25
Discharge: HOME | End: 2020-06-04
Attending: OBSTETRICS & GYNECOLOGY
Payer: MEDICAID

## 2020-06-04 VITALS
SYSTOLIC BLOOD PRESSURE: 118 MMHG | BODY MASS INDEX: 27.51 KG/M2 | WEIGHT: 165.38 LBS | DIASTOLIC BLOOD PRESSURE: 64 MMHG

## 2020-06-04 DIAGNOSIS — Z3A.33 33 WEEKS GESTATION OF PREGNANCY: Primary | ICD-10-CM

## 2020-06-04 DIAGNOSIS — Z30.9 ENCOUNTER FOR CONTRACEPTIVE MANAGEMENT, UNSPECIFIED TYPE: ICD-10-CM

## 2020-06-04 DIAGNOSIS — D57.219 SICKLE-CELL-HEMOGLOBIN C DISEASE WITH CRISIS: ICD-10-CM

## 2020-06-04 PROCEDURE — 76819 FETAL BIOPHYS PROFIL W/O NST: CPT

## 2020-06-04 PROCEDURE — 99999 PR PBB SHADOW E&M-EST. PATIENT-LVL II: CPT | Mod: PBBFAC,,, | Performed by: OBSTETRICS & GYNECOLOGY

## 2020-06-04 PROCEDURE — 99213 PR OFFICE/OUTPT VISIT, EST, LEVL III, 20-29 MIN: ICD-10-PCS | Mod: S$PBB,TH,, | Performed by: OBSTETRICS & GYNECOLOGY

## 2020-06-04 PROCEDURE — 76819 FETAL BIOPHYS PROFIL W/O NST: CPT | Mod: 26,,, | Performed by: OBSTETRICS & GYNECOLOGY

## 2020-06-04 PROCEDURE — 99999 PR PBB SHADOW E&M-EST. PATIENT-LVL II: ICD-10-PCS | Mod: PBBFAC,,, | Performed by: OBSTETRICS & GYNECOLOGY

## 2020-06-04 PROCEDURE — 99213 OFFICE O/P EST LOW 20 MIN: CPT | Mod: S$PBB,TH,, | Performed by: OBSTETRICS & GYNECOLOGY

## 2020-06-04 PROCEDURE — 99212 OFFICE O/P EST SF 10 MIN: CPT | Mod: PBBFAC,25 | Performed by: OBSTETRICS & GYNECOLOGY

## 2020-06-04 PROCEDURE — 76819 PR US, OB, FETAL BIOPHYSICAL, W/O NST: ICD-10-PCS | Mod: 26,,, | Performed by: OBSTETRICS & GYNECOLOGY

## 2020-06-04 PROCEDURE — 87086 URINE CULTURE/COLONY COUNT: CPT

## 2020-06-05 LAB — BACTERIA UR CULT: NORMAL

## 2020-06-06 ENCOUNTER — TELEPHONE (OUTPATIENT)
Dept: OPTOMETRY | Facility: CLINIC | Age: 25
End: 2020-06-06

## 2020-06-09 ENCOUNTER — HOSPITAL ENCOUNTER (INPATIENT)
Facility: OTHER | Age: 25
LOS: 2 days | Discharge: HOME | DRG: 831 | End: 2020-06-11
Attending: OBSTETRICS & GYNECOLOGY | Admitting: OBSTETRICS & GYNECOLOGY
Payer: MEDICAID

## 2020-06-09 DIAGNOSIS — Z36.9 ENCOUNTER FOR FETAL ULTRASOUND: Primary | ICD-10-CM

## 2020-06-09 DIAGNOSIS — D57.00 SICKLE CELL CRISIS: Primary | ICD-10-CM

## 2020-06-09 LAB
BASOPHILS # BLD AUTO: 0.02 K/UL (ref 0–0.2)
BASOPHILS NFR BLD: 0.3 % (ref 0–1.9)
DIFFERENTIAL METHOD: ABNORMAL
EOSINOPHIL # BLD AUTO: 0 K/UL (ref 0–0.5)
EOSINOPHIL NFR BLD: 0.5 % (ref 0–8)
ERYTHROCYTE [DISTWIDTH] IN BLOOD BY AUTOMATED COUNT: 18.1 % (ref 11.5–14.5)
HCT VFR BLD AUTO: 22.4 % (ref 37–48.5)
HGB BLD-MCNC: 7.8 G/DL (ref 12–16)
IMM GRANULOCYTES # BLD AUTO: 0.08 K/UL (ref 0–0.04)
IMM GRANULOCYTES NFR BLD AUTO: 1.2 % (ref 0–0.5)
LYMPHOCYTES # BLD AUTO: 1 K/UL (ref 1–4.8)
LYMPHOCYTES NFR BLD: 15.6 % (ref 18–48)
MCH RBC QN AUTO: 30 PG (ref 27–31)
MCHC RBC AUTO-ENTMCNC: 34.8 G/DL (ref 32–36)
MCV RBC AUTO: 86 FL (ref 82–98)
MONOCYTES # BLD AUTO: 0.4 K/UL (ref 0.3–1)
MONOCYTES NFR BLD: 5.5 % (ref 4–15)
NEUTROPHILS # BLD AUTO: 5 K/UL (ref 1.8–7.7)
NEUTROPHILS NFR BLD: 76.9 % (ref 38–73)
NRBC BLD-RTO: 1 /100 WBC
PLATELET # BLD AUTO: 96 K/UL (ref 150–350)
PMV BLD AUTO: ABNORMAL FL (ref 9.2–12.9)
RBC # BLD AUTO: 2.6 M/UL (ref 4–5.4)
RETICS/RBC NFR AUTO: 6.2 % (ref 0.5–2.5)
WBC # BLD AUTO: 6.53 K/UL (ref 3.9–12.7)

## 2020-06-09 PROCEDURE — 96361 HYDRATE IV INFUSION ADD-ON: CPT

## 2020-06-09 PROCEDURE — 99285 EMERGENCY DEPT VISIT HI MDM: CPT | Mod: 25

## 2020-06-09 PROCEDURE — 96374 THER/PROPH/DIAG INJ IV PUSH: CPT

## 2020-06-09 PROCEDURE — 11000001 HC ACUTE MED/SURG PRIVATE ROOM

## 2020-06-09 PROCEDURE — 85045 AUTOMATED RETICULOCYTE COUNT: CPT

## 2020-06-09 PROCEDURE — 85025 COMPLETE CBC W/AUTO DIFF WBC: CPT

## 2020-06-09 PROCEDURE — 80053 COMPREHEN METABOLIC PANEL: CPT

## 2020-06-10 ENCOUNTER — ANESTHESIA EVENT (OUTPATIENT)
Dept: OBSTETRICS AND GYNECOLOGY | Facility: OTHER | Age: 25
End: 2020-06-10

## 2020-06-10 ENCOUNTER — ANESTHESIA (OUTPATIENT)
Dept: OBSTETRICS AND GYNECOLOGY | Facility: OTHER | Age: 25
End: 2020-06-10

## 2020-06-10 PROBLEM — Z3A.34 34 WEEKS GESTATION OF PREGNANCY: Status: ACTIVE | Noted: 2020-06-10

## 2020-06-10 LAB
ABO + RH BLD: NORMAL
ALBUMIN SERPL BCP-MCNC: 3 G/DL (ref 3.5–5.2)
ALP SERPL-CCNC: 91 U/L (ref 55–135)
ALT SERPL W/O P-5'-P-CCNC: 10 U/L (ref 10–44)
ANION GAP SERPL CALC-SCNC: 10 MMOL/L (ref 8–16)
AST SERPL-CCNC: 18 U/L (ref 10–40)
BASOPHILS # BLD AUTO: 0.01 K/UL (ref 0–0.2)
BASOPHILS # BLD AUTO: 0.01 K/UL (ref 0–0.2)
BASOPHILS NFR BLD: 0.2 % (ref 0–1.9)
BASOPHILS NFR BLD: 0.2 % (ref 0–1.9)
BILIRUB SERPL-MCNC: 0.9 MG/DL (ref 0.1–1)
BILIRUB SERPL-MCNC: NORMAL MG/DL
BLD GP AB SCN CELLS X3 SERPL QL: NORMAL
BLD PROD TYP BPU: NORMAL
BLOOD UNIT EXPIRATION DATE: NORMAL
BLOOD UNIT TYPE CODE: 9500
BLOOD UNIT TYPE: NORMAL
BLOOD URINE, POC: NORMAL
BUN SERPL-MCNC: 4 MG/DL (ref 6–20)
CALCIUM SERPL-MCNC: 8.5 MG/DL (ref 8.7–10.5)
CHLORIDE SERPL-SCNC: 110 MMOL/L (ref 95–110)
CO2 SERPL-SCNC: 18 MMOL/L (ref 23–29)
CODING SYSTEM: NORMAL
COLOR, POC UA: NORMAL
CREAT SERPL-MCNC: 0.7 MG/DL (ref 0.5–1.4)
CREAT SERPL-MCNC: 0.7 MG/DL (ref 0.5–1.4)
DIFFERENTIAL METHOD: ABNORMAL
DIFFERENTIAL METHOD: ABNORMAL
DISPENSE STATUS: NORMAL
EOSINOPHIL # BLD AUTO: 0 K/UL (ref 0–0.5)
EOSINOPHIL # BLD AUTO: 0 K/UL (ref 0–0.5)
EOSINOPHIL NFR BLD: 0.4 % (ref 0–8)
EOSINOPHIL NFR BLD: 0.8 % (ref 0–8)
ERYTHROCYTE [DISTWIDTH] IN BLOOD BY AUTOMATED COUNT: 17.2 % (ref 11.5–14.5)
ERYTHROCYTE [DISTWIDTH] IN BLOOD BY AUTOMATED COUNT: 17.6 % (ref 11.5–14.5)
EST. GFR  (AFRICAN AMERICAN): >60 ML/MIN/1.73 M^2
EST. GFR  (AFRICAN AMERICAN): >60 ML/MIN/1.73 M^2
EST. GFR  (NON AFRICAN AMERICAN): >60 ML/MIN/1.73 M^2
EST. GFR  (NON AFRICAN AMERICAN): >60 ML/MIN/1.73 M^2
GLUCOSE SERPL-MCNC: 93 MG/DL (ref 70–110)
GLUCOSE UR QL STRIP: NORMAL
HCT VFR BLD AUTO: 20.1 % (ref 37–48.5)
HCT VFR BLD AUTO: 27 % (ref 37–48.5)
HGB BLD-MCNC: 6.9 G/DL (ref 12–16)
HGB BLD-MCNC: 9.2 G/DL (ref 12–16)
IMM GRANULOCYTES # BLD AUTO: 0.04 K/UL (ref 0–0.04)
IMM GRANULOCYTES # BLD AUTO: 0.04 K/UL (ref 0–0.04)
IMM GRANULOCYTES NFR BLD AUTO: 0.7 % (ref 0–0.5)
IMM GRANULOCYTES NFR BLD AUTO: 0.8 % (ref 0–0.5)
KETONES UR QL STRIP: NORMAL
LEUKOCYTE ESTERASE URINE, POC: NORMAL
LYMPHOCYTES # BLD AUTO: 0.8 K/UL (ref 1–4.8)
LYMPHOCYTES # BLD AUTO: 1 K/UL (ref 1–4.8)
LYMPHOCYTES NFR BLD: 13.6 % (ref 18–48)
LYMPHOCYTES NFR BLD: 19.5 % (ref 18–48)
MCH RBC QN AUTO: 29.5 PG (ref 27–31)
MCH RBC QN AUTO: 29.7 PG (ref 27–31)
MCHC RBC AUTO-ENTMCNC: 34.1 G/DL (ref 32–36)
MCHC RBC AUTO-ENTMCNC: 34.3 G/DL (ref 32–36)
MCV RBC AUTO: 87 FL (ref 82–98)
MCV RBC AUTO: 87 FL (ref 82–98)
MONOCYTES # BLD AUTO: 0.2 K/UL (ref 0.3–1)
MONOCYTES # BLD AUTO: 0.3 K/UL (ref 0.3–1)
MONOCYTES NFR BLD: 4.3 % (ref 4–15)
MONOCYTES NFR BLD: 6 % (ref 4–15)
NEUTROPHILS # BLD AUTO: 3.9 K/UL (ref 1.8–7.7)
NEUTROPHILS # BLD AUTO: 4.5 K/UL (ref 1.8–7.7)
NEUTROPHILS NFR BLD: 72.7 % (ref 38–73)
NEUTROPHILS NFR BLD: 80.8 % (ref 38–73)
NITRITE, POC UA: NORMAL
NRBC BLD-RTO: 0 /100 WBC
NRBC BLD-RTO: 0 /100 WBC
NUM UNITS TRANS PACKED RBC: NORMAL
PH, POC UA: 6
PLATELET # BLD AUTO: 79 K/UL (ref 150–350)
PLATELET # BLD AUTO: 88 K/UL (ref 150–350)
PLATELET BLD QL SMEAR: ABNORMAL
PMV BLD AUTO: ABNORMAL FL (ref 9.2–12.9)
PMV BLD AUTO: ABNORMAL FL (ref 9.2–12.9)
POTASSIUM SERPL-SCNC: 3.6 MMOL/L (ref 3.5–5.1)
PROT SERPL-MCNC: 6.4 G/DL (ref 6–8.4)
PROTEIN, POC: NORMAL
RBC # BLD AUTO: 2.32 M/UL (ref 4–5.4)
RBC # BLD AUTO: 3.12 M/UL (ref 4–5.4)
SARS-COV-2 RDRP RESP QL NAA+PROBE: NEGATIVE
SODIUM SERPL-SCNC: 138 MMOL/L (ref 136–145)
SPECIFIC GRAVITY, POC UA: 1.01
UROBILINOGEN, POC UA: NORMAL
WBC # BLD AUTO: 5.29 K/UL (ref 3.9–12.7)
WBC # BLD AUTO: 5.6 K/UL (ref 3.9–12.7)

## 2020-06-10 PROCEDURE — 85025 COMPLETE CBC W/AUTO DIFF WBC: CPT | Mod: 91

## 2020-06-10 PROCEDURE — 83020 HEMOGLOBIN ELECTROPHORESIS: CPT

## 2020-06-10 PROCEDURE — 36415 COLL VENOUS BLD VENIPUNCTURE: CPT

## 2020-06-10 PROCEDURE — 36430 TRANSFUSION BLD/BLD COMPNT: CPT

## 2020-06-10 PROCEDURE — 99223 PR INITIAL HOSPITAL CARE,LEVL III: ICD-10-PCS | Mod: 25,,, | Performed by: OBSTETRICS & GYNECOLOGY

## 2020-06-10 PROCEDURE — 63600175 PHARM REV CODE 636 W HCPCS: Performed by: STUDENT IN AN ORGANIZED HEALTH CARE EDUCATION/TRAINING PROGRAM

## 2020-06-10 PROCEDURE — P9040 RBC LEUKOREDUCED IRRADIATED: HCPCS

## 2020-06-10 PROCEDURE — 86850 RBC ANTIBODY SCREEN: CPT

## 2020-06-10 PROCEDURE — 99223 1ST HOSP IP/OBS HIGH 75: CPT | Mod: 25,,, | Performed by: OBSTETRICS & GYNECOLOGY

## 2020-06-10 PROCEDURE — 59025 PR FETAL 2N-STRESS TEST: ICD-10-PCS | Mod: 26,,, | Performed by: OBSTETRICS & GYNECOLOGY

## 2020-06-10 PROCEDURE — 83020 HEMOGLOBIN ELECTROPHORESIS: CPT | Mod: 91

## 2020-06-10 PROCEDURE — U0002 COVID-19 LAB TEST NON-CDC: HCPCS

## 2020-06-10 PROCEDURE — 82565 ASSAY OF CREATININE: CPT

## 2020-06-10 PROCEDURE — 25000003 PHARM REV CODE 250: Performed by: STUDENT IN AN ORGANIZED HEALTH CARE EDUCATION/TRAINING PROGRAM

## 2020-06-10 PROCEDURE — 59025 FETAL NON-STRESS TEST: CPT | Mod: 26,,, | Performed by: OBSTETRICS & GYNECOLOGY

## 2020-06-10 PROCEDURE — 86920 COMPATIBILITY TEST SPIN: CPT

## 2020-06-10 PROCEDURE — 11000001 HC ACUTE MED/SURG PRIVATE ROOM

## 2020-06-10 RX ORDER — HYDROMORPHONE HYDROCHLORIDE 2 MG/ML
0.5 INJECTION, SOLUTION INTRAMUSCULAR; INTRAVENOUS; SUBCUTANEOUS ONCE
Status: DISCONTINUED | OUTPATIENT
Start: 2020-06-10 | End: 2020-06-10

## 2020-06-10 RX ORDER — OXYCODONE HYDROCHLORIDE 5 MG/1
5 TABLET ORAL EVERY 6 HOURS PRN
Status: DISCONTINUED | OUTPATIENT
Start: 2020-06-10 | End: 2020-06-10

## 2020-06-10 RX ORDER — OXYCODONE HYDROCHLORIDE 5 MG/1
5 TABLET ORAL EVERY 4 HOURS PRN
Status: DISCONTINUED | OUTPATIENT
Start: 2020-06-10 | End: 2020-06-11 | Stop reason: HOSPADM

## 2020-06-10 RX ORDER — OXYCODONE HYDROCHLORIDE 5 MG/1
10 TABLET ORAL ONCE
Status: COMPLETED | OUTPATIENT
Start: 2020-06-10 | End: 2020-06-10

## 2020-06-10 RX ORDER — SIMETHICONE 80 MG
1 TABLET,CHEWABLE ORAL EVERY 6 HOURS PRN
Status: DISCONTINUED | OUTPATIENT
Start: 2020-06-10 | End: 2020-06-11 | Stop reason: HOSPADM

## 2020-06-10 RX ORDER — PRENATAL WITH FERROUS FUM AND FOLIC ACID 3080; 920; 120; 400; 22; 1.84; 3; 20; 10; 1; 12; 200; 27; 25; 2 [IU]/1; [IU]/1; MG/1; [IU]/1; MG/1; MG/1; MG/1; MG/1; MG/1; MG/1; UG/1; MG/1; MG/1; MG/1; MG/1
1 TABLET ORAL DAILY
Status: DISCONTINUED | OUTPATIENT
Start: 2020-06-10 | End: 2020-06-11 | Stop reason: HOSPADM

## 2020-06-10 RX ORDER — ONDANSETRON 8 MG/1
8 TABLET, ORALLY DISINTEGRATING ORAL EVERY 8 HOURS PRN
Status: DISCONTINUED | OUTPATIENT
Start: 2020-06-10 | End: 2020-06-11 | Stop reason: HOSPADM

## 2020-06-10 RX ORDER — AMOXICILLIN 250 MG
1 CAPSULE ORAL NIGHTLY PRN
Status: DISCONTINUED | OUTPATIENT
Start: 2020-06-10 | End: 2020-06-11 | Stop reason: HOSPADM

## 2020-06-10 RX ORDER — DIPHENHYDRAMINE HCL 25 MG
25 CAPSULE ORAL EVERY 4 HOURS PRN
Status: DISCONTINUED | OUTPATIENT
Start: 2020-06-10 | End: 2020-06-11 | Stop reason: HOSPADM

## 2020-06-10 RX ORDER — SODIUM CHLORIDE 9 MG/ML
INJECTION, SOLUTION INTRAVENOUS CONTINUOUS
Status: DISCONTINUED | OUTPATIENT
Start: 2020-06-10 | End: 2020-06-11 | Stop reason: HOSPADM

## 2020-06-10 RX ORDER — IBUPROFEN 600 MG/1
600 TABLET ORAL EVERY 6 HOURS
Status: DISCONTINUED | OUTPATIENT
Start: 2020-06-10 | End: 2020-06-10

## 2020-06-10 RX ORDER — ASPIRIN 81 MG/1
81 TABLET ORAL DAILY
Status: DISCONTINUED | OUTPATIENT
Start: 2020-06-10 | End: 2020-06-11 | Stop reason: HOSPADM

## 2020-06-10 RX ORDER — HYDROCODONE BITARTRATE AND ACETAMINOPHEN 500; 5 MG/1; MG/1
TABLET ORAL
Status: DISCONTINUED | OUTPATIENT
Start: 2020-06-10 | End: 2020-06-11 | Stop reason: HOSPADM

## 2020-06-10 RX ORDER — HYDROMORPHONE HYDROCHLORIDE 2 MG/ML
0.5 INJECTION, SOLUTION INTRAMUSCULAR; INTRAVENOUS; SUBCUTANEOUS ONCE
Status: COMPLETED | OUTPATIENT
Start: 2020-06-10 | End: 2020-06-10

## 2020-06-10 RX ORDER — ACETAMINOPHEN 325 MG/1
650 TABLET ORAL EVERY 6 HOURS PRN
Status: DISCONTINUED | OUTPATIENT
Start: 2020-06-10 | End: 2020-06-11 | Stop reason: HOSPADM

## 2020-06-10 RX ORDER — DIPHENHYDRAMINE HYDROCHLORIDE 50 MG/ML
25 INJECTION INTRAMUSCULAR; INTRAVENOUS EVERY 4 HOURS PRN
Status: DISCONTINUED | OUTPATIENT
Start: 2020-06-10 | End: 2020-06-11 | Stop reason: HOSPADM

## 2020-06-10 RX ORDER — ENOXAPARIN SODIUM 100 MG/ML
60 INJECTION SUBCUTANEOUS DAILY
Status: DISCONTINUED | OUTPATIENT
Start: 2020-06-10 | End: 2020-06-11 | Stop reason: HOSPADM

## 2020-06-10 RX ORDER — FERROUS SULFATE 325(65) MG
325 TABLET, DELAYED RELEASE (ENTERIC COATED) ORAL DAILY
Status: DISCONTINUED | OUTPATIENT
Start: 2020-06-10 | End: 2020-06-11 | Stop reason: HOSPADM

## 2020-06-10 RX ADMIN — ENOXAPARIN SODIUM 60 MG: 60 INJECTION SUBCUTANEOUS at 09:06

## 2020-06-10 RX ADMIN — PROMETHAZINE HYDROCHLORIDE 12.5 MG: 25 INJECTION INTRAMUSCULAR; INTRAVENOUS at 10:06

## 2020-06-10 RX ADMIN — Medication 1 TABLET: at 11:06

## 2020-06-10 RX ADMIN — SODIUM CHLORIDE: 0.9 INJECTION, SOLUTION INTRAVENOUS at 02:06

## 2020-06-10 RX ADMIN — FERROUS SULFATE TAB EC 325 MG (65 MG FE EQUIVALENT) 325 MG: 325 (65 FE) TABLET DELAYED RESPONSE at 11:06

## 2020-06-10 RX ADMIN — OXYCODONE HYDROCHLORIDE 5 MG: 5 TABLET ORAL at 06:06

## 2020-06-10 RX ADMIN — SODIUM CHLORIDE, SODIUM LACTATE, POTASSIUM CHLORIDE, AND CALCIUM CHLORIDE 1000 ML: .6; .31; .03; .02 INJECTION, SOLUTION INTRAVENOUS at 12:06

## 2020-06-10 RX ADMIN — ONDANSETRON 8 MG: 8 TABLET, ORALLY DISINTEGRATING ORAL at 10:06

## 2020-06-10 RX ADMIN — PROMETHAZINE HYDROCHLORIDE 12.5 MG: 25 INJECTION INTRAMUSCULAR; INTRAVENOUS at 09:06

## 2020-06-10 RX ADMIN — PROMETHAZINE HYDROCHLORIDE 12.5 MG: 25 INJECTION INTRAMUSCULAR; INTRAVENOUS at 02:06

## 2020-06-10 RX ADMIN — ASPIRIN 81 MG: 81 TABLET, COATED ORAL at 11:06

## 2020-06-10 RX ADMIN — OXYCODONE HYDROCHLORIDE 10 MG: 5 TABLET ORAL at 03:06

## 2020-06-10 RX ADMIN — PROMETHAZINE HYDROCHLORIDE 12.5 MG: 25 INJECTION INTRAMUSCULAR; INTRAVENOUS at 05:06

## 2020-06-10 RX ADMIN — OXYCODONE HYDROCHLORIDE 5 MG: 5 TABLET ORAL at 04:06

## 2020-06-10 RX ADMIN — HYDROMORPHONE HYDROCHLORIDE 0.5 MG: 2 INJECTION, SOLUTION INTRAMUSCULAR; INTRAVENOUS; SUBCUTANEOUS at 12:06

## 2020-06-10 NOTE — ANESTHESIA PREPROCEDURE EVALUATION
[facility]  Anesthesia Pre-Operative Evaluation         Patient Name: Murali Tarango  YOB: 1995  MRN: [id number]    06/10/2020      Murali Tarango is a 24 y.o. female  @ 34w0d who is admitted to antepartum with sickle cell crisis. 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 She denies Hx of HTN, asthma, back surgery, scoliosis, or bleeding disorders. She reports no issues with previous neuraxials.       OB History    Para Term  AB Living   3 2 2 0 0 2   SAB TAB Ectopic Multiple Live Births   0 0 0 0 2      # Outcome Date GA Lbr Rusty/2nd Weight Sex Delivery Anes PTL Lv   3 Current            2 Term 12/30/15    M    MELISSA   1 Term 14 37w3d   M CS-LTranv EPI  MELISSA      Obstetric Comments   G1 - [facility] (14) - C/S - emergency for FHR abnormalities. She was 4-5 cm.   G2 - [facility] (12/30/15) - C/S - elective repeat C/S given close interval pregnancy       Review of patient's allergies indicates:  No Known Allergies    Wt Readings from Last 1 Encounters:   06/10/20 0100 75.8 kg (167 lb)       BP Readings from Last 3 Encounters:   06/10/20 (!) 123/56   20 118/64   20 (!) 100/58       Patient Active Problem List   Diagnosis    Hemoglobin S-C disease    Thrombocytopenia    Anemia due to chronic illness    Supervision of normal subsequent pregnancy    Sickle cell pain crisis    TIA (transient ischemic attack)    Sickle cell crisis    Avascular necrosis of femur head, left    History of  delivery    Coats' disease of left eye    Dyspnea on exertion    Maternal sickle cell anemia complicating pregnancy in second trimester    34 weeks gestation of pregnancy       Past Surgical History:   Procedure Laterality Date     SECTION  2014     delivery x 2    EYE SURGERY      left eye       Social History      Socioeconomic History    Marital status: Single     Spouse name: Not on file    Number of children: Not on file    Years of education: Not on file    Highest education level: Not on file   Occupational History    Not on file   Social Needs    Financial resource strain: Not on file    Food insecurity:     Worry: Not on file     Inability: Not on file    Transportation needs:     Medical: Not on file     Non-medical: Not on file   Tobacco Use    Smoking status: Current Some Day Smoker     Types: Cigarettes    Smokeless tobacco: Never Used   Substance and Sexual Activity    Alcohol use: No    Drug use: No    Sexual activity: Yes     Partners: Male     Birth control/protection: None     Comment: single   Lifestyle    Physical activity:     Days per week: Not on file     Minutes per session: Not on file    Stress: Not on file   Relationships    Social connections:     Talks on phone: Not on file     Gets together: Not on file     Attends [employer] service: Not on file     Active member of club or organization: Not on file     Attends meetings of clubs or organizations: Not on file     Relationship status: Not on file   Other Topics Concern    Not on file   Social History Narrative    Not on file         Chemistry        Component Value Date/Time     06/09/2020 2245    K 3.6 06/09/2020 2245     06/09/2020 2245    CO2 18 (L) 06/09/2020 2245    BUN 4 (L) 06/09/2020 2245    CREATININE 0.7 06/09/2020 2245    GLU 93 06/09/2020 2245        Component Value Date/Time    CALCIUM 8.5 (L) 06/09/2020 2245    ALKPHOS 91 06/09/2020 2245    AST 18 06/09/2020 2245    ALT 10 06/09/2020 2245    BILITOT 0.9 06/09/2020 2245    ESTGFRAFRICA >60 06/09/2020 2245    EGFRNONAA >60 06/09/2020 2245            Lab Results   Component Value Date    WBC 6.53 06/09/2020    HGB 7.8 (L) 06/09/2020    HCT 22.4 (L) 06/09/2020    MCV 86 06/09/2020    PLT 96 (L) 06/09/2020       No results for input(s): PT, INR, PROTIME,  APTT in the last 72 hours.        Anesthesia Evaluation    I have reviewed the Patient Summary Reports.    I have reviewed the Nursing Notes.    I have reviewed the Medications.     Review of Systems  Anesthesia Hx:  No problems with previous Anesthesia  Denies Family Hx of Anesthesia complications.   Denies Personal Hx of Anesthesia complications.   Hematology/Oncology:         -- Thrombocytopenia: Hematology Comments: Sickle cell disease   Cardiovascular:  Cardiovascular Normal     Pulmonary:  Pulmonary Normal  Denies Asthma.    Hepatic/GI:  Hepatic/GI Normal    Musculoskeletal:  Musculoskeletal Normal    Neurological:   TIA,    Endocrine:  Endocrine Normal        Physical Exam  General:  Well nourished    Airway/Jaw/Neck:  Airway Findings: Mouth Opening: Normal Tongue: Normal  Mallampati: IV  Improves to III with phonation.  TM Distance: Normal, at least 6 cm  Jaw/Neck Findings:  Neck ROM: Normal ROM      Dental:  Dental Findings: In tact   Chest/Lungs:  Chest/Lungs Clear    Heart/Vascular:  Heart Findings: Normal       Mental Status:  Mental Status Findings:  Cooperative, Alert and Oriented         Anesthesia Plan  Type of Anesthesia, risks & benefits discussed:  Anesthesia Type:  CSE, epidural, general, spinal  Patient's Preference:   Intra-op Monitoring Plan: standard ASA monitors  Intra-op Monitoring Plan Comments:   Post Op Pain Control Plan: per primary service following discharge from PACU, IV/PO Opioids PRN, multimodal analgesia and epidural analgesia  Post Op Pain Control Plan Comments:   Induction:    Beta Blocker:  Patient is not currently on a Beta-Blocker (No further documentation required).       Informed Consent: Patient understands risks and agrees with Anesthesia plan.  Questions answered. Anesthesia consent signed with patient.  ASA Score: 3     Day of Surgery Review of History & Physical:    H&P update referred to the provider.         Ready For Surgery From Anesthesia Perspective.

## 2020-06-10 NOTE — ED PROVIDER NOTES
"Encounter Date: 2020        History     Chief Complaint   Patient presents with    Contractions     Murali CHOI Emelina is a 24 y.o. [id number] at 33w6d presents complaining of contractions since 0800. She says she has been losing her mucus plug as well. She denies vaginal bleeding, loss of fluids.   This IUP is complicated by sickle cell disease.      She also complains of generalized pain in her arms and legs that feels like her sickle cell pain.     I discussed with the patient risks and benefits of obtaining CXR in pregnancy. We discussed the the amount of radiation exposure with CXR is minimal and considered acceptable in pregnancy. The fetal radiation exposure with CXR is "very low dose" per ACOG at 0.0005-0.01 mGy. The minimal threshold for adverse fetal effects is considered to be 50-60mGy, well above the level of a CXR. Additionally, the patient should be wearing an abdominal shield which would further minimize radiation exposure.     The patient agrees to proceed with chest x-ray.           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           Past Surgical History:   Procedure Laterality Date     SECTION  2014     delivery x 2    EYE SURGERY      left eye     Family History   Problem Relation Age of Onset    Breast cancer Neg Hx     Colon cancer Neg Hx     Ovarian cancer Neg Hx     Cancer Neg Hx     Diabetes Neg Hx     Eclampsia Neg Hx     Hypertension Neg Hx     Miscarriages / Stillbirths Neg Hx      labor Neg Hx     Stroke Neg Hx      Social History     Tobacco Use    Smoking status: Current Some Day Smoker     Types: Cigarettes    Smokeless tobacco: Never Used   Substance Use Topics    " Alcohol use: No    Drug use: No     Review of Systems   Constitutional: Negative for chills and fever.   HENT: Negative for facial swelling.    Eyes: Negative for visual disturbance.   Respiratory: Negative for chest tightness and shortness of breath.    Cardiovascular: Negative for chest pain.   Gastrointestinal: Positive for abdominal pain (uterine contractions). Negative for nausea and vomiting.   Genitourinary: Negative for dysuria, vaginal bleeding and vaginal discharge.   Skin: Negative.    Neurological: Negative for headaches.   Psychiatric/Behavioral: Negative.        Physical Exam     Initial Vitals   BP Pulse Resp Temp SpO2   06/09/20 2231 06/09/20 2227 06/09/20 2231 06/09/20 2231 06/09/20 2227   118/65 105 18 98 °F (36.7 °C) 100 %      MAP       --                Physical Exam    Vitals reviewed.  Constitutional: She appears well-developed and well-nourished.   HENT:   Head: Normocephalic and atraumatic.   Eyes: EOM are normal.   Neck: Normal range of motion. Neck supple.   Cardiovascular: Normal rate and regular rhythm.   Pulmonary/Chest: No respiratory distress.   Abdominal: Soft. There is no tenderness.   Musculoskeletal: Normal range of motion.   Neurological: She is alert and oriented to person, place, and time.   Skin: Skin is warm and dry.   Psychiatric: She has a normal mood and affect.     OB LABOR EXAM:       Method: Sterile vaginal exam per MD.       Dilatation: 0.   Station: -3.   Effacement: 50%.       Comments: FT/50/-3       ED Course   Obtain Fetal nonstress test (NST)  Date/Time: 6/9/2020 11:36 PM  Performed by: Candace Ruth MD  Authorized by: Lashaun King MD     Nonstress Test:     Variability:  6-25 BPM    Accelerations:  15 bpm    Baseline:  135  Biophysical Profile:     Nonstress Test Interpretation: reactive      Overall Impression:  Reassuring  Post-procedure:     Patient tolerance:  Patient tolerated the procedure well with no immediate complications      Labs Reviewed  "  CBC W/ AUTO DIFFERENTIAL - Abnormal; Notable for the following components:       Result Value    RBC 2.60 (*)     Hemoglobin 7.8 (*)     Hematocrit 22.4 (*)     RDW 18.1 (*)     Platelets 96 (*)     Immature Granulocytes 1.2 (*)     Immature Grans (Abs) 0.08 (*)     nRBC 1 (*)     Gran% 76.9 (*)     Lymph% 15.6 (*)     All other components within normal limits   COMPREHENSIVE METABOLIC PANEL - Abnormal; Notable for the following components:    CO2 18 (*)     BUN, Bld 4 (*)     Calcium 8.5 (*)     Albumin 3.0 (*)     All other components within normal limits   RETICULOCYTES - Abnormal; Notable for the following components:    Retic 6.2 (*)     All other components within normal limits   HEMOGLOBIN ELECTROPHORESIS,HGB A2 MUSHTAQ.   CBC W/ AUTO DIFFERENTIAL   SARS-COV-2 RNA AMPLIFICATION, QUAL   CREATININE, SERUM   POCT URINALYSIS, DIPSTICK OR TABLET REAGENT, AUTOMATED, WITH MICROSCOP          Imaging Results          X-Ray Chest AP Portable (Final result)  Result time 06/10/20 00:53:27    Final result by Leonid Rubio MD (06/10/20 00:53:27)                 Impression:      Mild cardiomegaly with minimally prominent interstitial lung markings.  No large focal consolidation or other acute abnormality identified on this single view.      Electronically signed by: Leonid Rubio MD  Date:    06/10/2020  Time:    00:53             Narrative:    EXAMINATION:  XR CHEST AP PORTABLE    CLINICAL HISTORY:  Provided history is "  Hb-SS disease with crisis, unspecified".    TECHNIQUE:  One view of the chest.    COMPARISON:  01/03/2019.    FINDINGS:  Lung volumes are relatively low.  Cardiomediastinal silhouette appears enlarged.  Minimally prominent interstitial lung markings in the lower lung zones.  No large focal consolidation.  No sizable pleural effusion.  No pneumothorax.  Electronic device and catheter overlies the upper abdomen, unclear significance.                                 Medical Decision Making: "   Differential Diagnosis:   PTL   Crosby Mccabe  ED Management:  NST x 20 minutes reactive and reassuring  [place] shows occasional contractions  CBC: 6/7/22/96  Retic count: 6.2 (previously 5.5)  SVE: FT/50/-3  CXR Impression:Mild cardiomegaly with minimally prominent interstitial lung markings.  No large focal consolidation or other acute abnormality identified on this single view.  Urine dip specific gravity 1.015  CMP wnl  1 liter LR bolus  Dilaudid for pain   Admit for sickle cell pain crisis. Pain management, fluid replenishment, transfusion.   Other:   I have discussed this case with another health care provider.       <> Summary of the Discussion: Dr. Yun                                    Clinical Impression:       ICD-10-CM ICD-9-CM   1. Sickle cell crisis D57.00 282.62             ED Disposition Condition    Send to L&D                  SIDNEY Brandt PGY1

## 2020-06-10 NOTE — ASSESSMENT & PLAN NOTE
- Continuous fetal monitoring & continuous toco. Switch to NST BID in AM.  - Will not give late  steroids at this time per MFM.  - hx of  x 2. Consents signed for rLTCS if indicated.   - No indication of PTL at this time.

## 2020-06-10 NOTE — H&P
[facility]  Obstetrics  History & Physical    Patient Name: Murali Tarango  MRN: [id number]  Admission Date: 2020  Primary Care Provider: Esteban Powell MD    Subjective:     Principal Problem:Sickle cell crisis    History of Present Illness:  Murali Tarango is a 24 y.o. [id number] at 33w6d presents complaining of contractions since 0800. She says she has been losing her mucus plug as well. She denies vaginal bleeding, loss of fluids.   This IUP is complicated by sickle cell disease.       She also complains of generalized pain in her arms, legs, & hips that feels like her sickle cell pain.     Obstetric HPI:  Patient reports contractions, active fetal movement, No vaginal bleeding , No loss of fluid     This pregnancy has been complicated by Hgb SC disease, thrombocytopenia, Coat's disease, hx of TIA.     OB History    Para Term  AB Living   3 2 2 0 0 2   SAB TAB Ectopic Multiple Live Births   0 0 0 0 2      # Outcome Date GA Lbr Rusty/2nd Weight Sex Delivery Anes PTL Lv   3 Current            2 Term 12/30/15    M    MELISSA   1 Term 14 37w3d   M CS-LTranv EPI  MELISSA      Apgar1: 8  Apgar5: 8      Obstetric Comments   G1 - [facility] (14) - C/S - emergency for FHR abnormalities. She was 4-5 cm.   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           Past Surgical History:   Procedure Laterality Date     SECTION  2014     delivery x 2    EYE SURGERY      left eye         (Not in a hospital admission)    Review of patient's allergies indicates:  No Known Allergies     Family History     None        Tobacco Use    Smoking status: Current  Some Day Smoker     Types: Cigarettes    Smokeless tobacco: Never Used   Substance and Sexual Activity    Alcohol use: No    Drug use: No    Sexual activity: Yes     Partners: Male     Birth control/protection: None     Comment: single     Review of Systems   Objective:     Vital Signs (Most Recent):  Temp: 97.8 °F (36.6 °C) (06/10/20 0207)  Pulse: 75 (06/10/20 0207)  Resp: 18 (06/10/20 0207)  BP: (!) 123/56 (06/10/20 0207)  SpO2: 100 % (06/10/20 0207) Vital Signs (24h Range):  Temp:  [97.8 °F (36.6 °C)-98 °F (36.7 °C)] 97.8 °F (36.6 °C)  Pulse:  [] 75  Resp:  [18] 18  SpO2:  [98 %-100 %] 100 %  BP: (118-123)/(56-65) 123/56     Weight: 75.8 kg (167 lb)  Body mass index is 27.79 kg/m².    FHT: Baseline 135, mod btbv, + accels, - decels Cat 1 (reassuring)  TOCO:  not present    Physical Exam:   Constitutional: She is oriented to person, place, and time. She appears well-developed and well-nourished.    HENT:   Head: Normocephalic and atraumatic.    Eyes: Pupils are equal, round, and reactive to light. EOM are normal.    Neck: Normal range of motion. Neck supple.    Cardiovascular: Normal rate and regular rhythm.     Pulmonary/Chest: Effort normal.        Abdominal: Soft.     Genitourinary: Vagina normal and uterus normal.           Musculoskeletal: Normal range of motion and moves all extremeties.       Neurological: She is alert and oriented to person, place, and time.    Skin: Skin is warm and dry.        Cervix:  Dilation:  0.5  Effacement:  50%  Station: -3  Presentation: Not scanned.      Significant Labs:  Lab Results   Component Value Date    GROUPTRH O POS 12/17/2019    HEPBSAG Negative 12/17/2019    STREPBCULT No Group B Streptococcus isolated 11/10/2014       Recent Labs   Lab 06/04/20  1135 06/09/20  2245   WBC 7.02 6.53   HGB 8.2* 7.8*   HCT 23.8* 22.4*   MCV 87 86   PLT 90* 96*     Recent Labs   Lab 06/09/20  2245      K 3.6      CO2 18*   BUN 4*   CREATININE 0.7   GLU 93   PROT 6.4    BILITOT 0.9   ALKPHOS 91   ALT 10   AST 18     Reticulocyte count: 6.2  CXR: No acute abnormality.       Assessment/Plan:     24 y.o. female  at 34w0d for:    * Sickle cell crisis  - Patient with pain mimicking prior crises  - H/H: 7.8/ (baseline 8.9/)  - No signs of infection or dehydration as source of crisis. No white count. Urine dip unremarkable.  - CXR: Mild cardiomegaly with minimally prominent interstitial lung markings.  No large focal consolidation   - Hemoglobin electrophoresis collected  - IV fluid management w/ continuous normal saline infusion  - Pain management with Tylenol, Oxy IR   - Repeat CBC in AM to determine if need for blood transfusion.     34 weeks gestation of pregnancy  - Continuous fetal monitoring & continuous toco. Switch to NST BID in AM.  - Will not give late  steroids at this time per M.  - hx of  x 2. Consents signed for rLTCS if indicated.   - No indication of PTL at this time.    Coats' disease of left eye  - Patient continued on Lovenox 60mg daily inpatient   - Patient continued on Aspirin daily    Thrombocytopenia  - Has been evaluated by hem onc; no clear evaluation  - Platelets stable this admission at 96.        Candace Ruth MD  Obstetrics  [facility]

## 2020-06-10 NOTE — HPI
Murali CHOI Emleina is a 24 y.o. [id number] at 33w6d presents complaining of contractions since 0800. She says she has been losing her mucus plug as well. She denies vaginal bleeding, loss of fluids.   This IUP is complicated by sickle cell disease.       She also complains of generalized pain in her arms, legs, & hips that feels like her sickle cell pain.

## 2020-06-10 NOTE — ASSESSMENT & PLAN NOTE
- Patient with pain mimicking prior crises  - H/H: 7.8/22 (baseline 8.9/25)  - No signs of infection or dehydration as source of crisis. No white count. Urine dip unremarkable.  - CXR: Mild cardiomegaly with minimally prominent interstitial lung markings.  No large focal consolidation   - Hemoglobin electrophoresis collected  - IV fluid management w/ continuous normal saline infusion  - Pain management with Tylenol, Oxy IR   - Repeat CBC in AM to determine if need for blood transfusion.

## 2020-06-10 NOTE — HOSPITAL COURSE
06/10/2020 - Admit to L&D for sickle cell pain crisis. Will perform pain management and fluid resuscitation.   06/11/2020 - Patient feeling much better this AM. H/H 9.2/27 after receiving 1u PRBC yesterday. Will plan to discharge home today.

## 2020-06-10 NOTE — SUBJECTIVE & OBJECTIVE
Obstetric HPI:  Patient reports contractions, active fetal movement, No vaginal bleeding , No loss of fluid     This pregnancy has been complicated by Hgb SC disease, thrombocytopenia, Coat's disease, hx of TIA.     OB History    Para Term  AB Living   3 2 2 0 0 2   SAB TAB Ectopic Multiple Live Births   0 0 0 0 2      # Outcome Date GA Lbr Rusty/2nd Weight Sex Delivery Anes PTL Lv   3 Current            2 Term 12/30/15    M    MELISSA   1 Term 14 37w3d   M CS-LTranv EPI  MELISSA      Apgar1: 8  Apgar5: 8      Obstetric Comments   G1 - [facility] (14) - C/S - emergency for FHR abnormalities. She was 4-5 cm.   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           Past Surgical History:   Procedure Laterality Date     SECTION  2014     delivery x 2    EYE SURGERY      left eye         (Not in a hospital admission)    Review of patient's allergies indicates:  No Known Allergies     Family History     None        Tobacco Use    Smoking status: Current Some Day Smoker     Types: Cigarettes    Smokeless tobacco: Never Used   Substance and Sexual Activity    Alcohol use: No    Drug use: No    Sexual activity: Yes     Partners: Male     Birth control/protection: None     Comment: single     Review of Systems   Objective:     Vital Signs (Most Recent):  Temp: 97.8 °F (36.6 °C) (06/10/20 020)  Pulse: 75 (06/10/20 0207)  Resp: 18 (06/10/20 0207)  BP: (!) 123/56 (06/10/20 0207)  SpO2: 100 % (06/10/20 0207) Vital Signs (24h Range):  Temp:  [97.8 °F (36.6 °C)-98 °F (36.7 °C)] 97.8 °F (36.6 °C)  Pulse:  [] 75  Resp:  [18] 18  SpO2:  [98 %-100 %] 100 %  BP: (118-123)/(56-65) 123/56     Weight: 75.8 kg  (167 lb)  Body mass index is 27.79 kg/m².    FHT: Baseline 135, mod btbv, + accels, - decels Cat 1 (reassuring)  TOCO:  not present    Physical Exam:   Constitutional: She is oriented to person, place, and time. She appears well-developed and well-nourished.    HENT:   Head: Normocephalic and atraumatic.    Eyes: Pupils are equal, round, and reactive to light. EOM are normal.    Neck: Normal range of motion. Neck supple.    Cardiovascular: Normal rate and regular rhythm.     Pulmonary/Chest: Effort normal.        Abdominal: Soft.     Genitourinary: Vagina normal and uterus normal.           Musculoskeletal: Normal range of motion and moves all extremeties.       Neurological: She is alert and oriented to person, place, and time.    Skin: Skin is warm and dry.        Cervix:  Dilation:  0  Effacement:  0%  Station: -3  Presentation: Transverse     Significant Labs:  Lab Results   Component Value Date    GROUPTRH O POS 12/17/2019    HEPBSAG Negative 12/17/2019    STREPBCULT No Group B Streptococcus isolated 11/10/2014       Recent Labs   Lab 06/04/20  1135 06/09/20  2245   WBC 7.02 6.53   HGB 8.2* 7.8*   HCT 23.8* 22.4*   MCV 87 86   PLT 90* 96*     Recent Labs   Lab 06/09/20  2245      K 3.6      CO2 18*   BUN 4*   CREATININE 0.7   GLU 93   PROT 6.4   BILITOT 0.9   ALKPHOS 91   ALT 10   AST 18     Reticulocyte count: 6.2  CXR: No acute abnormality.

## 2020-06-11 VITALS
HEART RATE: 75 BPM | RESPIRATION RATE: 18 BRPM | SYSTOLIC BLOOD PRESSURE: 109 MMHG | OXYGEN SATURATION: 98 % | HEIGHT: 65 IN | BODY MASS INDEX: 27.82 KG/M2 | DIASTOLIC BLOOD PRESSURE: 56 MMHG | WEIGHT: 167 LBS | TEMPERATURE: 97 F

## 2020-06-11 PROBLEM — D57.00 SICKLE CELL CRISIS: Status: RESOLVED | Noted: 2017-07-30 | Resolved: 2020-06-11

## 2020-06-11 LAB
HGB A2 MFR BLD HPLC: 3.2 % (ref 2.2–3.2)
HGB FRACT BLD ELPH-IMP: NORMAL
HGB FRACT BLD ELPH-IMP: NORMAL

## 2020-06-11 PROCEDURE — 59025 PR FETAL 2N-STRESS TEST: ICD-10-PCS | Mod: 26,,, | Performed by: OBSTETRICS & GYNECOLOGY

## 2020-06-11 PROCEDURE — 25000003 PHARM REV CODE 250: Performed by: STUDENT IN AN ORGANIZED HEALTH CARE EDUCATION/TRAINING PROGRAM

## 2020-06-11 PROCEDURE — 99238 PR HOSPITAL DISCHARGE DAY,<30 MIN: ICD-10-PCS | Mod: 25,,, | Performed by: OBSTETRICS & GYNECOLOGY

## 2020-06-11 PROCEDURE — 99238 HOSP IP/OBS DSCHRG MGMT 30/<: CPT | Mod: 25,,, | Performed by: OBSTETRICS & GYNECOLOGY

## 2020-06-11 PROCEDURE — 59025 FETAL NON-STRESS TEST: CPT | Mod: 26,,, | Performed by: OBSTETRICS & GYNECOLOGY

## 2020-06-11 PROCEDURE — 63600175 PHARM REV CODE 636 W HCPCS: Performed by: STUDENT IN AN ORGANIZED HEALTH CARE EDUCATION/TRAINING PROGRAM

## 2020-06-11 RX ORDER — OXYCODONE HYDROCHLORIDE 5 MG/1
5 TABLET ORAL EVERY 6 HOURS PRN
Qty: 30 TABLET | Refills: 0 | Status: SHIPPED | OUTPATIENT
Start: 2020-06-11 | End: 2020-06-11

## 2020-06-11 RX ORDER — OXYCODONE HYDROCHLORIDE 5 MG/1
5 TABLET ORAL EVERY 6 HOURS PRN
Qty: 30 TABLET | Refills: 0 | Status: ON HOLD | OUTPATIENT
Start: 2020-06-11 | End: 2020-07-12 | Stop reason: HOSPADM

## 2020-06-11 RX ADMIN — ENOXAPARIN SODIUM 60 MG: 60 INJECTION SUBCUTANEOUS at 09:06

## 2020-06-11 RX ADMIN — PROMETHAZINE HYDROCHLORIDE 12.5 MG: 25 INJECTION INTRAMUSCULAR; INTRAVENOUS at 04:06

## 2020-06-11 RX ADMIN — ASPIRIN 81 MG: 81 TABLET, COATED ORAL at 09:06

## 2020-06-11 RX ADMIN — OXYCODONE HYDROCHLORIDE 5 MG: 5 TABLET ORAL at 04:06

## 2020-06-11 RX ADMIN — FERROUS SULFATE TAB EC 325 MG (65 MG FE EQUIVALENT) 325 MG: 325 (65 FE) TABLET DELAYED RESPONSE at 09:06

## 2020-06-11 RX ADMIN — SODIUM CHLORIDE: 0.9 INJECTION, SOLUTION INTRAVENOUS at 07:06

## 2020-06-11 NOTE — DISCHARGE SUMMARY
[facility]  Obstetrics  Discharge Summary      Patient Name: Murali Tarango  MRN: [id number]  Admission Date: 2020  Hospital Length of Stay: 1 days  Discharge Date and Time:  2020 9:53 AM  Attending Physician: Zoila Donaldson MD   Discharging Provider: Neida Clark MD   Primary Care Provider: Esteban Powell MD    HPI: Murali Tarango is a 24 y.o. [id number] at 33w6d presents complaining of contractions since 0800. She says she has been losing her mucus plug as well. She denies vaginal bleeding, loss of fluids.   This IUP is complicated by sickle cell disease.       She also complains of generalized pain in her arms, legs, & hips that feels like her sickle cell pain.     FHT: 140, mod variability, + accels, - decels, reassuring  TOCO:  no ctx    * No surgery found *     Hospital Course:   06/10/2020 - Admit to L&D for sickle cell pain crisis. Will perform pain management and fluid resuscitation.   2020 - Patient feeling much better this AM. H/H 9.2 after receiving 1u PRBC yesterday. Will plan to discharge home today.         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  Pending Diagnostic Studies:     Procedure Component Value Units Date/Time    Hemoglobin Electrophoresis,Hgb A2 Ghulam. [[id number]] Collected:  06/10/20 0125    Order Status:  Sent Lab Status:  In process Updated:  06/10/20 0942    Specimen:  Blood           Discharged Condition: good    Disposition: Home or Self Care    Follow Up:  Follow-up Information     TESTING,  PRENATAL. Go on 6/12/2020.    Specialty:  Obstetrics and Gynecology           Caryl Hartman MD. Go on 6/29/2020.    Specialties:  Obstetrics and Gynecology, Maternal and Fetal Medicine  Why:  [facility] appointment  Contact information:  [facility] [address] [address]  [phone]                 Patient Instructions:      Diet Adult Regular     Notify your health care provider if you experience any of the following:   Order Comments: Heavy vaginal bleeding saturating more than 1 pad per hr for at least consecutive 2 hrs.     Notify your health care provider if you experience any of the following:  difficulty breathing or increased cough     Notify your health care provider if you experience any of the following:  severe persistent headache     Notify your health care provider if you experience any of the following:  persistent dizziness, light-headedness, or visual disturbances     Notify your health care provider if you experience any of the following:  increased confusion or weakness     Notify your health care provider if you experience any of the following:  severe uncontrolled pain     Notify your health care provider if you experience any of the following:  persistent nausea and vomiting or diarrhea     Notify your health care provider if you experience any of the following:  temperature >100.4     Activity as tolerated     Medications:  Current Discharge Medication List      START taking these medications    Details   oxyCODONE (ROXICODONE) 5 MG immediate release tablet Take 1 tablet (5 mg total) by mouth every 6 (six) hours as needed.  Qty: 30 tablet, Refills: 0    Comments: Quantity prescribed more than 7 day supply? Yes, quantity medically necessary         CONTINUE these medications which have NOT CHANGED    Details   aspirin (ECOTRIN) 81 MG EC tablet Take 1 tablet (81 mg total) by mouth once daily.  Qty: 150 tablet, Refills: 2      enoxaparin (LOVENOX) 60 mg/0.6 mL Syrg Inject 0.6  mLs (60 mg total) into the skin once daily.  Qty: 30 Syringe, Refills: 10    Associated Diagnoses: Thrombocytopenia; Anemia due to chronic illness; Sickle-cell-hemoglobin C disease with crisis; Dyspnea on exertion      ferrous sulfate (FEOSOL) 325 mg (65 mg iron) Tab tablet Take 1 tablet (325 mg total) by mouth once daily.  Qty: 30 tablet, Refills: 0      folic acid (FOLVITE) 1 MG tablet Take 4 tablets (4 mg total) by mouth once daily.  Qty: 150 tablet, Refills: 3      glutamine, bulk, (L-GLUTAMINE) Take 14 g by mouth 2 (two) times daily.  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      promethazine (PHENERGAN) 6.25 mg/5 mL syrup Take 20 mLs (25 mg total) by mouth every 6 (six) hours as needed for Nausea.  Qty: 118 mL, Refills: 2      tobramycin sulfate 0.3% (TOBREX) 0.3 % ophthalmic solution Place 1 drop into the left eye 4 (four) times daily. for 10 days  Qty: 5 mL, Refills: 3    Associated Diagnoses: Angular blepharoconjunctivitis of left eye             Neida Clark MD  Obstetrics  [facility]

## 2020-06-11 NOTE — ASSESSMENT & PLAN NOTE
- Patient with pain similar to prior crises on admit  - No signs of infection or dehydration as source of crisis. No white count. Urine dip unremarkable.  - CXR: Mild cardiomegaly with minimally prominent interstitial lung markings.  No large focal consolidation   - Hgb electrophoresis pending  - H/H: 7.8/22>6.9/20.1>1u PRBC>9.2/27  - IV fluid management w/ continuous normal saline infusion  - Pain management with Tylenol, Oxy IR   - Patient overall feeling much better. Will plan to discharge home today.

## 2020-06-11 NOTE — PROGRESS NOTES
[facility]  Obstetrics  Antepartum Progress Note    Patient Name: Murali Tarango  MRN: [id number]  Admission Date: 2020  Hospital Length of Stay: 1 days  Attending Physician: Zoila Donaldson MD  Primary Care Provider: Esteban Powell MD    Subjective:     Principal Problem:Sickle cell crisis    HPI:  Murali Tarango is a 24 y.o. [id number] at 33w6d presents complaining of contractions since 0800. She says she has been losing her mucus plug as well. She denies vaginal bleeding, loss of fluids.   This IUP is complicated by sickle cell disease.       She also complains of generalized pain in her arms, legs, & hips that feels like her sickle cell pain.     Hospital Course:  06/10/2020 - Admit to L&D for sickle cell pain crisis. Will perform pain management and fluid resuscitation.   2020 - Patient feeling much better this AM. H/H 9. after receiving 1u PRBC yesterday. Will plan to discharge home today.    Obstetric HPI:  Patient states that she is overall feeling better. Her pain is well controlled with oxy IR 5mg x1 overnight. She is tolerating PO and ambulating without difficulty. She denies any light-headedness, dizziness, or SOB. She denies contractions, vaginal bleeding, and LOF. She reports good fetal movement.      Objective:     Vital Signs (Most Recent):  Temp: 97.2 °F (36.2 °C) (20 0603)  Pulse: 68 (20 0602)  Resp: 18 (20 06)  BP: (!) 105/57 (20 0601)  SpO2: 95 % (20 06) Vital Signs (24h Range):  Temp:  [96.3 °F (35.7 °C)-98 °F (36.7 °C)] 97.2 °F (36.2 °C)  Pulse:  [67-85] 68  Resp:  [18-20] 18  SpO2:  [95 %-100 %] 95 %  BP: ()/(49-73) 105/57     Weight: 75.8 kg (167 lb)  Body mass index is 27.79 kg/m².    FHT: 140, mod variability, + accels, - decels, reassuring  TOCO:  no CTX      Intake/Output Summary (Last 24 hours) at 2020 0652  Last data filed at 6/10/2020 1745  Gross per 24 hour   Intake 3400 ml    Output 5100 ml   Net -1700 ml       Cervical Exam: deferred     Significant Labs:  Recent Lab Results       06/10/20  1723        Baso # 0.01     Basophil% 0.2     Differential Method Automated     Eos # 0.0     Eosinophil% 0.4     Gran # (ANC) 4.5     Gran% 80.8     Hematocrit 27.0     Hemoglobin 9.2     Immature Grans (Abs) 0.04  Comment:  Mild elevation in immature granulocytes is non specific and   can be seen in a variety of conditions including stress response,   acute inflammation, trauma and pregnancy. Correlation with other   laboratory and clinical findings is essential.       Immature Granulocytes 0.7     Lymph # 0.8     Lymph% 13.6     MCH 29.5     MCHC 34.1     MCV 87     Mono # 0.2     Mono% 4.3     MPV SEE COMMENT  Comment:  Result not available.     nRBC 0     Platelet Estimate Decreased     Platelets 88     RBC 3.12     RDW 17.2     WBC 5.60           Physical Exam:   Constitutional: She is oriented to person, place, and time. She appears well-developed and well-nourished. No distress.    HENT:   Head: Normocephalic and atraumatic.      Cardiovascular: Normal rate.     Pulmonary/Chest: Effort normal. No respiratory distress.        Abdominal: Soft. There is no tenderness. There is no rebound and no guarding.   Gravid. Non-tender.             Musculoskeletal: Normal range of motion. She exhibits no edema or tenderness.       Neurological: She is alert and oriented to person, place, and time.    Skin: Skin is warm and dry.    Psychiatric: She has a normal mood and affect. Her behavior is normal. Judgment and thought content normal.       Assessment/Plan:     24 y.o. female  at 34w1d for:    * Sickle cell crisis  - Patient with pain similar to prior crises on admit  - No signs of infection or dehydration as source of crisis. No white count. Urine dip unremarkable.  - CXR: Mild cardiomegaly with minimally prominent interstitial lung markings.  No large focal consolidation   - Hgb  electrophoresis pending  - H/H: 7.8/22>6.9/20.1>1u PRBC>9.2/27  - IV fluid management w/ continuous normal saline infusion  - Pain management with Tylenol, Oxy IR   - Patient overall feeling much better. Will plan to discharge home today.    34 weeks gestation of pregnancy  - NST BID  - hx of  x 2. Consents signed for rLTCS if indicated    Coats' disease of left eye  - Patient continued on Lovenox 60mg daily inpatient   - Patient continued on Aspirin daily    Thrombocytopenia  - Has been evaluated by hem onc; no clear evaluation  - Platelets 88            Neida Clark MD  Obstetrics  [facility]

## 2020-06-11 NOTE — SUBJECTIVE & OBJECTIVE
Obstetric HPI:  Patient states that she is overall feeling better. Her pain is well controlled with oxy IR 5mg x1 overnight. She is tolerating PO and ambulating without difficulty. She denies any light-headedness, dizziness, or SOB. She denies contractions, vaginal bleeding, and LOF. She reports good fetal movement.      Objective:     Vital Signs (Most Recent):  Temp: 97.2 °F (36.2 °C) (06/11/20 0603)  Pulse: 68 (06/11/20 0602)  Resp: 18 (06/11/20 0602)  BP: (!) 105/57 (06/11/20 0601)  SpO2: 95 % (06/11/20 0602) Vital Signs (24h Range):  Temp:  [96.3 °F (35.7 °C)-98 °F (36.7 °C)] 97.2 °F (36.2 °C)  Pulse:  [67-85] 68  Resp:  [18-20] 18  SpO2:  [95 %-100 %] 95 %  BP: ()/(49-73) 105/57     Weight: 75.8 kg (167 lb)  Body mass index is 27.79 kg/m².    FHT: 140, mod variability, + accels, - decels, reassuring  TOCO:  no CTX      Intake/Output Summary (Last 24 hours) at 6/11/2020 0652  Last data filed at 6/10/2020 1745  Gross per 24 hour   Intake 3400 ml   Output 5100 ml   Net -1700 ml       Cervical Exam: deferred     Significant Labs:  Recent Lab Results       06/10/20  1723        Baso # 0.01     Basophil% 0.2     Differential Method Automated     Eos # 0.0     Eosinophil% 0.4     Gran # (ANC) 4.5     Gran% 80.8     Hematocrit 27.0     Hemoglobin 9.2     Immature Grans (Abs) 0.04  Comment:  Mild elevation in immature granulocytes is non specific and   can be seen in a variety of conditions including stress response,   acute inflammation, trauma and pregnancy. Correlation with other   laboratory and clinical findings is essential.       Immature Granulocytes 0.7     Lymph # 0.8     Lymph% 13.6     MCH 29.5     MCHC 34.1     MCV 87     Mono # 0.2     Mono% 4.3     MPV SEE COMMENT  Comment:  Result not available.     nRBC 0     Platelet Estimate Decreased     Platelets 88     RBC 3.12     RDW 17.2     WBC 5.60           Physical Exam:   Constitutional: She is oriented to person, place, and time. She appears  well-developed and well-nourished. No distress.    HENT:   Head: Normocephalic and atraumatic.      Cardiovascular: Normal rate.     Pulmonary/Chest: Effort normal. No respiratory distress.        Abdominal: Soft. There is no tenderness. There is no rebound and no guarding.   Gravid. Non-tender.             Musculoskeletal: Normal range of motion. She exhibits no edema or tenderness.       Neurological: She is alert and oriented to person, place, and time.    Skin: Skin is warm and dry.    Psychiatric: She has a normal mood and affect. Her behavior is normal. Judgment and thought content normal.

## 2020-06-15 ENCOUNTER — HOSPITAL ENCOUNTER (OUTPATIENT)
Dept: PERINATAL CARE | Facility: OTHER | Age: 25
Discharge: HOME | End: 2020-06-15
Attending: OBSTETRICS & GYNECOLOGY
Payer: MEDICAID

## 2020-06-15 DIAGNOSIS — D57.219 SICKLE-CELL-HEMOGLOBIN C DISEASE WITH CRISIS: ICD-10-CM

## 2020-06-15 PROCEDURE — 76815 PR  US,PREGNANT UTERUS,LIMITED, 1/> FETUSES: ICD-10-PCS | Mod: 26,,, | Performed by: OBSTETRICS & GYNECOLOGY

## 2020-06-15 PROCEDURE — 76815 OB US LIMITED FETUS(S): CPT | Mod: 26,,, | Performed by: OBSTETRICS & GYNECOLOGY

## 2020-06-15 PROCEDURE — 59025 PR FETAL 2N-STRESS TEST: ICD-10-PCS | Mod: 26,,, | Performed by: OBSTETRICS & GYNECOLOGY

## 2020-06-15 PROCEDURE — 59025 FETAL NON-STRESS TEST: CPT

## 2020-06-15 PROCEDURE — 59025 FETAL NON-STRESS TEST: CPT | Mod: 26,,, | Performed by: OBSTETRICS & GYNECOLOGY

## 2020-06-15 PROCEDURE — 76815 OB US LIMITED FETUS(S): CPT

## 2020-06-16 LAB — HGB FRACT BLD ELPH PH6.0-IMP: NORMAL

## 2020-06-18 ENCOUNTER — HOSPITAL ENCOUNTER (OUTPATIENT)
Dept: PERINATAL CARE | Facility: OTHER | Age: 25
Discharge: HOME | End: 2020-06-18
Attending: OBSTETRICS & GYNECOLOGY
Payer: MEDICAID

## 2020-06-18 ENCOUNTER — ROUTINE PRENATAL (OUTPATIENT)
Dept: OBSTETRICS AND GYNECOLOGY | Facility: CLINIC | Age: 25
End: 2020-06-18
Payer: MEDICAID

## 2020-06-18 VITALS
WEIGHT: 167.31 LBS | DIASTOLIC BLOOD PRESSURE: 70 MMHG | SYSTOLIC BLOOD PRESSURE: 124 MMHG | BODY MASS INDEX: 27.85 KG/M2

## 2020-06-18 DIAGNOSIS — D57.219 SICKLE-CELL-HEMOGLOBIN C DISEASE WITH CRISIS: ICD-10-CM

## 2020-06-18 DIAGNOSIS — Z3A.35 35 WEEKS GESTATION OF PREGNANCY: Primary | ICD-10-CM

## 2020-06-18 PROCEDURE — 99213 OFFICE O/P EST LOW 20 MIN: CPT | Mod: PBBFAC,25 | Performed by: NURSE PRACTITIONER

## 2020-06-18 PROCEDURE — 76815 OB US LIMITED FETUS(S): CPT

## 2020-06-18 PROCEDURE — 76815 PR  US,PREGNANT UTERUS,LIMITED, 1/> FETUSES: ICD-10-PCS | Mod: 26,,, | Performed by: PEDIATRICS

## 2020-06-18 PROCEDURE — 76815 OB US LIMITED FETUS(S): CPT | Mod: 26,,, | Performed by: PEDIATRICS

## 2020-06-18 PROCEDURE — 87081 CULTURE SCREEN ONLY: CPT

## 2020-06-18 PROCEDURE — 99212 OFFICE O/P EST SF 10 MIN: CPT | Mod: TH,S$PBB,, | Performed by: NURSE PRACTITIONER

## 2020-06-18 PROCEDURE — 59025 FETAL NON-STRESS TEST: CPT | Mod: 26,,, | Performed by: PEDIATRICS

## 2020-06-18 PROCEDURE — 99999 PR PBB SHADOW E&M-EST. PATIENT-LVL III: CPT | Mod: PBBFAC,,, | Performed by: NURSE PRACTITIONER

## 2020-06-18 PROCEDURE — 59025 PR FETAL 2N-STRESS TEST: ICD-10-PCS | Mod: 26,,, | Performed by: PEDIATRICS

## 2020-06-18 PROCEDURE — 59025 FETAL NON-STRESS TEST: CPT

## 2020-06-18 PROCEDURE — 99212 PR OFFICE/OUTPT VISIT, EST, LEVL II, 10-19 MIN: ICD-10-PCS | Mod: TH,S$PBB,, | Performed by: NURSE PRACTITIONER

## 2020-06-18 PROCEDURE — 99999 PR PBB SHADOW E&M-EST. PATIENT-LVL III: ICD-10-PCS | Mod: PBBFAC,,, | Performed by: NURSE PRACTITIONER

## 2020-06-18 NOTE — PROGRESS NOTES
Here for routine OB appt at 35w1d, with no complaints.  Reports good FM.  Denies LOF, denies VB, denies contractions. Having another BOY. Wants a repeat c/s-thought she was getting it at 37 weeks? Will send message to Dr. Donaldson. GBS today, twice weekly  testing. NEEDS TDAP  Reviewed warning signs of Labor and Preeclampsia.  Daily FM counts reinforced.  F/U scheduled 2 weeks  Labs/GBS/PNT today

## 2020-06-20 LAB — BACTERIA SPEC AEROBE CULT: NORMAL

## 2020-06-21 DIAGNOSIS — D57.219 SICKLE-CELL-HEMOGLOBIN C DISEASE WITH CRISIS: Primary | ICD-10-CM

## 2020-06-21 DIAGNOSIS — Z01.818 PREOP EXAMINATION: ICD-10-CM

## 2020-06-22 ENCOUNTER — TELEPHONE (OUTPATIENT)
Dept: OBSTETRICS AND GYNECOLOGY | Facility: CLINIC | Age: 25
End: 2020-06-22

## 2020-06-22 ENCOUNTER — HOSPITAL ENCOUNTER (EMERGENCY)
Facility: OTHER | Age: 25
Discharge: HOME | End: 2020-06-22
Attending: OBSTETRICS & GYNECOLOGY
Payer: MEDICAID

## 2020-06-22 VITALS
SYSTOLIC BLOOD PRESSURE: 118 MMHG | OXYGEN SATURATION: 99 % | TEMPERATURE: 98 F | DIASTOLIC BLOOD PRESSURE: 56 MMHG | HEART RATE: 73 BPM

## 2020-06-22 DIAGNOSIS — D57.1 MATERNAL SICKLE CELL ANEMIA COMPLICATING PREGNANCY IN SECOND TRIMESTER: ICD-10-CM

## 2020-06-22 DIAGNOSIS — Z98.891 HISTORY OF CESAREAN DELIVERY: ICD-10-CM

## 2020-06-22 DIAGNOSIS — D57.20 SICKLE CELL-HEMOGLOBIN C DISEASE WITHOUT CRISIS: ICD-10-CM

## 2020-06-22 DIAGNOSIS — O47.9 UTERINE CONTRACTIONS DURING PREGNANCY: Primary | ICD-10-CM

## 2020-06-22 DIAGNOSIS — O99.012 MATERNAL SICKLE CELL ANEMIA COMPLICATING PREGNANCY IN SECOND TRIMESTER: ICD-10-CM

## 2020-06-22 DIAGNOSIS — Z36.89 NST (NON-STRESS TEST) REACTIVE: ICD-10-CM

## 2020-06-22 PROCEDURE — 99284 PR EMERGENCY DEPT VISIT,LEVEL IV: ICD-10-PCS | Mod: 25,,, | Performed by: OBSTETRICS & GYNECOLOGY

## 2020-06-22 PROCEDURE — 59025 FETAL NON-STRESS TEST: CPT

## 2020-06-22 PROCEDURE — 59025 FETAL NON-STRESS TEST: CPT | Mod: 26,,, | Performed by: OBSTETRICS & GYNECOLOGY

## 2020-06-22 PROCEDURE — 59025 PR FETAL 2N-STRESS TEST: ICD-10-PCS | Mod: 26,,, | Performed by: OBSTETRICS & GYNECOLOGY

## 2020-06-22 PROCEDURE — 99284 EMERGENCY DEPT VISIT MOD MDM: CPT | Mod: 25,,, | Performed by: OBSTETRICS & GYNECOLOGY

## 2020-06-22 PROCEDURE — 99284 EMERGENCY DEPT VISIT MOD MDM: CPT | Mod: 25

## 2020-06-22 NOTE — TELEPHONE ENCOUNTER
Pt was informed of message that Dr. Donaldson sent via the portal.Thank you for seeing our nurse practitioner last week. We are having to take mandatory vacation this month due to coronavirus so the schedule is a little off. I spoke with our high risk ob doctors to clarify timing of delivery for you. They have recommended delivery at 37 weeks only if you are having recurrent pain crises. Otherwise, they recommend delivery at 39 weeks. I know your partner needed to request off of work, but we may just have to play it by ear to some extent. Why don't we plan for delivery at 39 weeks which will be 07/15, but if something changes, then we will of course move up your delivery. We are also requiring all of our patients to be tested for coronavirus 2 days prior to their scheduled surgery. I will have amaury help set this up for you. We are going to try and do the  at 1 pm on Wednesday, 07/15 and I will be there. This means nothing to eat after midnight the night before, but you can have clear liquids (water and gatorade) until you leave your house.   I will also go ahead and send you that valtrex medication that you can start now - that medication will be twice per day.      ----- Message from Zoila Donaldson MD sent at 2020  7:05 PM CDT -----  I sent her this long my chart message, but I am not sure how much she uses my chart. Do you mind calling her to make sure she received my message? Thank you!    Hi,  Thank you for seeing our nurse practitioner last week. We are having to take mandatory vacation this month due to coronavirus so the schedule is a little off. I spoke with our high risk ob doctors to clarify timing of delivery for you. They have recommended delivery at 37 weeks only if you are having recurrent pain crises. Otherwise, they recommend delivery at 39 weeks. I know your partner needed to request off of work, but we may just have to play it by ear to some extent. Why don't we plan for delivery  at 39 weeks which will be 07/15, but if something changes, then we will of course move up your delivery. We are also requiring all of our patients to be tested for coronavirus 2 days prior to their scheduled surgery. I will have amaury help set this up for you. We are going to try and do the  at 1 pm on Wednesday, 07/15 and I will be there. This means nothing to eat after midnight the night before, but you can have clear liquids (water and gatorade) until you leave your house.   I will also go ahead and send you that valtrex medication that you can start now - that medication will be twice per day.  Let me know if you have any questions/concerns.  Carlito,  Zoila Donaldson MD  Obstetrics and Gynecology  [facility]

## 2020-06-22 NOTE — ED PROVIDER NOTES
Encounter Date: 2020       History     Chief Complaint   Patient presents with    Contractions     Murali CHOI Emelina is a 24 y.o. [id number] at 35w5d presents complaining of painful contractions for the past day. The patient was on her way to PNT and was instead wheeled to the OB ED as she was seen unable to walk uninterrupted due to her painful contractions. They occur every 20-30 min, per pt.     This IUP is complicated by sickle cell anemia, h/o CS x2,. The patient was hospitalized earlier in the third trimester 2/2 pain crisis.  Patient  denies vaginal bleeding, denies LOF.   Fetal Movement: normal.      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           Past Surgical History:   Procedure Laterality Date     SECTION  2014     delivery x 2    EYE SURGERY      left eye     Family History   Problem Relation Age of Onset    Breast cancer Neg Hx     Colon cancer Neg Hx     Ovarian cancer Neg Hx     Cancer Neg Hx     Diabetes Neg Hx     Eclampsia Neg Hx     Hypertension Neg Hx     Miscarriages / Stillbirths Neg Hx      labor Neg Hx     Stroke Neg Hx      Social History     Tobacco Use    Smoking status: Current Some Day Smoker     Types: Cigarettes    Smokeless tobacco: Never Used   Substance Use Topics    Alcohol use: No    Drug use: No     Review of Systems   Constitutional: Negative for chills and fever.   HENT: Negative for sore throat.    Eyes: Negative for visual disturbance.   Respiratory: Negative for chest tightness, shortness of breath and wheezing.    Cardiovascular: Negative for chest pain and palpitations.   Gastrointestinal: Positive for abdominal pain. Negative for diarrhea.    Endocrine: Negative.    Genitourinary: Negative for difficulty urinating.   Musculoskeletal: Negative for back pain and myalgias.   Skin: Negative for color change.   Neurological: Negative for dizziness, light-headedness and headaches.   Psychiatric/Behavioral: Negative for confusion.   All other systems reviewed and are negative.      Physical Exam     Initial Vitals [06/22/20 1420]   BP Pulse Resp Temp SpO2   (!) 126/59 78 -- -- 100 %      MAP       --         Physical Exam    Vitals reviewed.  Constitutional: She appears well-developed and well-nourished. No distress.   HENT:   Head: Normocephalic and atraumatic.   Eyes: EOM are normal.   Neck: Normal range of motion.   Cardiovascular: Normal rate.   Pulmonary/Chest: She has no wheezes.   Abdominal: Soft. There is no abdominal tenderness.   Musculoskeletal: Normal range of motion.   Neurological: She is alert and oriented to person, place, and time.   Skin: Skin is warm, dry and intact. No rash noted.   Psychiatric: She has a normal mood and affect. Her speech is normal and behavior is normal.     OB LABOR EXAM:       Method: Sterile vaginal exam per MD.   Vaginal Bleeding: none present.     Dilatation: 0.   Station: -3.   Effacement: 40%.   Amniotic Fluid Color: no fluid.           ED Course   Obtain Fetal nonstress test (NST)    Date/Time: 6/22/2020 2:46 PM  Performed by: Loco Roth MD  Authorized by: Loco Roth MD     Nonstress Test:     Decelerations:  None    Baseline:  135    Contraction Frequency:  10-20  Biophysical Profile:     Nonstress Test Interpretation: reactive      Overall Impression:  Reassuring      Labs Reviewed - No data to display       Imaging Results    None          Medical Decision Making:   ED Management:  VSS  No c/o sickle crisis  FT/40/-3 on initial exam; will repeat in 1-2 hours  PO hydration  NST reactive, reassuring  MVP: 4 cm  Cervical exam unchanged after 1 hour  Stable for discharge  Pt has follow up in 1 week w/  M                                 Clinical Impression:       ICD-10-CM ICD-9-CM   1. Uterine contractions during pregnancy  O62.2 661.20   2. Maternal sickle cell anemia complicating pregnancy in second trimester  O99.012 648.23    D57.1 282.60   3. 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 History of  delivery  Z98.891 V45.89   5. NST (non-stress test) reactive  Z36.89 V28.89         Disposition:   Disposition: Discharged  Condition: Stable         PHUONG. Mundo Roth MD  OBGYN PGY1                Loco Roth MD  Resident  [phone]

## 2020-06-23 ENCOUNTER — PATIENT MESSAGE (OUTPATIENT)
Dept: HEMATOLOGY/ONCOLOGY | Facility: CLINIC | Age: 25
End: 2020-06-23

## 2020-06-23 ENCOUNTER — TELEPHONE (OUTPATIENT)
Dept: HEMATOLOGY/ONCOLOGY | Facility: CLINIC | Age: 25
End: 2020-06-23

## 2020-06-23 NOTE — TELEPHONE ENCOUNTER
"Dr Donaldson,    We are attempting to communicate regarding this patient to ensure clarification from her consult visit w/ Dr Shankar  (on 1/3/20 )  hematology/ oncology    Per yesterdays encounter, I see you had clarifications regarding her history of herpes and wanted to ensure that you received clarification on what was assessed and documented in that encounter .      Per Dr Shankar's physical  Assessment of the pt during the  visit ,he notes   the presence of "  vesicular lesions along the right lateral edge of pts lower lip and in the philtrum region" .  Again,, this is a lip lesion and there is NO other reference nor assessment of genital  history or lesions otherwise.   His note closes with summary recommendations for Herpes labialis ( aka: herpes simplex/ cold sore ) as valtrex 1gm bid x 7 days ( safe in pregnancy ) .  Again , this was back in jan when he assessed her .       From communications our staff has received from the patient, we are concerned there could have been misinterpreted clarifications regarding her history as it pertains to her upcoming delivery.   this has caused the patient to escalate to our team and suggest "wrong " documentation and assessment from the consult.  Despite our efforts to clarify.      We hope  that this has not complicated any care and would gratefully ask for your help in clarifying the mis-communications and  misperceptions with the  patient         Appreciatively ,   Hematology team     "

## 2020-06-25 ENCOUNTER — HOSPITAL ENCOUNTER (OUTPATIENT)
Dept: PERINATAL CARE | Facility: OTHER | Age: 25
Discharge: HOME | End: 2020-06-25
Attending: OBSTETRICS & GYNECOLOGY
Payer: MEDICAID

## 2020-06-25 DIAGNOSIS — D57.219 SICKLE-CELL-HEMOGLOBIN C DISEASE WITH CRISIS: ICD-10-CM

## 2020-06-25 PROCEDURE — 76815 OB US LIMITED FETUS(S): CPT | Mod: 26,,, | Performed by: OBSTETRICS & GYNECOLOGY

## 2020-06-25 PROCEDURE — 59025 FETAL NON-STRESS TEST: CPT | Mod: 26,,, | Performed by: OBSTETRICS & GYNECOLOGY

## 2020-06-25 PROCEDURE — 59025 FETAL NON-STRESS TEST: CPT

## 2020-06-25 PROCEDURE — 76815 OB US LIMITED FETUS(S): CPT

## 2020-06-25 PROCEDURE — 59025 PR FETAL 2N-STRESS TEST: ICD-10-PCS | Mod: 26,,, | Performed by: OBSTETRICS & GYNECOLOGY

## 2020-06-25 PROCEDURE — 76815 PR  US,PREGNANT UTERUS,LIMITED, 1/> FETUSES: ICD-10-PCS | Mod: 26,,, | Performed by: OBSTETRICS & GYNECOLOGY

## 2020-06-25 NOTE — PROGRESS NOTES
Doing well today. Interested in scheduling repeat  delivery. She has started PNT, continue twice weekly.   See me in 2 weeks.    Zoila Donaldson MD  Obstetrics and Gynecology  [facility]

## 2020-06-29 ENCOUNTER — TELEPHONE (OUTPATIENT)
Dept: MATERNAL FETAL MEDICINE | Facility: CLINIC | Age: 25
End: 2020-06-29

## 2020-06-29 NOTE — TELEPHONE ENCOUNTER
RN returned patient call and rescheduled appointment for next Monday.       ----- Message from Siomara Omer MA sent at 6/29/2020  2:26 PM CDT -----  Name of Who is Calling:JEM ALCANTARA [[id number]]    What is the request in detail: Pt is calling to Reschedule Appointment  . Pt is requesting a to speak to clinical team.Pt states her  never came to watch her kids so she need to reschedule. Pt was schedule for 2:00 today>Please contact to further discuss and advise        Can the clinic reply by [facility]:      What Number to Call Back if not in [facility]:[phone]

## 2020-07-02 ENCOUNTER — HOSPITAL ENCOUNTER (OUTPATIENT)
Dept: PERINATAL CARE | Facility: OTHER | Age: 25
Discharge: HOME | End: 2020-07-02
Attending: OBSTETRICS & GYNECOLOGY
Payer: MEDICAID

## 2020-07-02 ENCOUNTER — ROUTINE PRENATAL (OUTPATIENT)
Dept: OBSTETRICS AND GYNECOLOGY | Facility: CLINIC | Age: 25
End: 2020-07-02
Payer: MEDICAID

## 2020-07-02 VITALS
BODY MASS INDEX: 28.21 KG/M2 | DIASTOLIC BLOOD PRESSURE: 80 MMHG | WEIGHT: 169.56 LBS | SYSTOLIC BLOOD PRESSURE: 118 MMHG

## 2020-07-02 DIAGNOSIS — D57.219 SICKLE-CELL-HEMOGLOBIN C DISEASE WITH CRISIS: ICD-10-CM

## 2020-07-02 DIAGNOSIS — Z3A.37 37 WEEKS GESTATION OF PREGNANCY: Primary | ICD-10-CM

## 2020-07-02 PROCEDURE — 99213 OFFICE O/P EST LOW 20 MIN: CPT | Mod: PBBFAC,25 | Performed by: NURSE PRACTITIONER

## 2020-07-02 PROCEDURE — 87086 URINE CULTURE/COLONY COUNT: CPT

## 2020-07-02 PROCEDURE — 99212 PR OFFICE/OUTPT VISIT, EST, LEVL II, 10-19 MIN: ICD-10-PCS | Mod: TH,S$PBB,, | Performed by: NURSE PRACTITIONER

## 2020-07-02 PROCEDURE — 76815 PR  US,PREGNANT UTERUS,LIMITED, 1/> FETUSES: ICD-10-PCS | Mod: 26,,, | Performed by: OBSTETRICS & GYNECOLOGY

## 2020-07-02 PROCEDURE — 59025 PR FETAL 2N-STRESS TEST: ICD-10-PCS | Mod: 26,,, | Performed by: OBSTETRICS & GYNECOLOGY

## 2020-07-02 PROCEDURE — 76815 OB US LIMITED FETUS(S): CPT

## 2020-07-02 PROCEDURE — 99999 PR PBB SHADOW E&M-EST. PATIENT-LVL III: CPT | Mod: PBBFAC,,, | Performed by: NURSE PRACTITIONER

## 2020-07-02 PROCEDURE — 59025 FETAL NON-STRESS TEST: CPT | Mod: 26,,, | Performed by: OBSTETRICS & GYNECOLOGY

## 2020-07-02 PROCEDURE — 99999 PR PBB SHADOW E&M-EST. PATIENT-LVL III: ICD-10-PCS | Mod: PBBFAC,,, | Performed by: NURSE PRACTITIONER

## 2020-07-02 PROCEDURE — 76815 OB US LIMITED FETUS(S): CPT | Mod: 26,,, | Performed by: OBSTETRICS & GYNECOLOGY

## 2020-07-02 PROCEDURE — 59025 FETAL NON-STRESS TEST: CPT

## 2020-07-02 PROCEDURE — 99212 OFFICE O/P EST SF 10 MIN: CPT | Mod: TH,S$PBB,, | Performed by: NURSE PRACTITIONER

## 2020-07-02 NOTE — PROGRESS NOTES
Here for routine OB appt at 37w1d.  Reports good FM.  Denies LOF, denies VB, denies contractions. Just came from PNT. GBS was negative, taking Valtrex. Currently scheduled on 7/20 for repeat c/s. Given rx for oxycodone by hem/onc on 6/11 for pain-went to pharmacy but it was not there. She will send message today about getting it. C/o some shoulder and leg pain from SC, tolerable-does not feel like crisis. Drinking plenty of water. Reviewed warning signs of Labor and Preeclampsia.  Daily FM counts reinforced.  F/U scheduled 1 week with Dr. Donaldson  Continue prenatal testing twice weekly

## 2020-07-02 NOTE — PATIENT INSTRUCTIONS
LABOR AND DELIVERY PHONE NUMBER, [phone] (OPEN 24/7, LOCATED ON 6TH FLOOR OF HOSPITAL)  [address] PHONE NUMBER, [phone] (OPEN MON-FRI, 8a-5p)

## 2020-07-04 LAB
BACTERIA UR CULT: NORMAL
BACTERIA UR CULT: NORMAL

## 2020-07-06 ENCOUNTER — INITIAL CONSULT (OUTPATIENT)
Dept: MATERNAL FETAL MEDICINE | Facility: CLINIC | Age: 25
End: 2020-07-06
Attending: OBSTETRICS & GYNECOLOGY
Payer: MEDICAID

## 2020-07-06 ENCOUNTER — PROCEDURE VISIT (OUTPATIENT)
Dept: MATERNAL FETAL MEDICINE | Facility: CLINIC | Age: 25
End: 2020-07-06
Payer: MEDICAID

## 2020-07-06 VITALS
WEIGHT: 173.75 LBS | BODY MASS INDEX: 28.91 KG/M2 | SYSTOLIC BLOOD PRESSURE: 108 MMHG | DIASTOLIC BLOOD PRESSURE: 68 MMHG

## 2020-07-06 DIAGNOSIS — G45.9 TIA (TRANSIENT ISCHEMIC ATTACK): ICD-10-CM

## 2020-07-06 DIAGNOSIS — D63.8 ANEMIA DUE TO CHRONIC ILLNESS: Primary | ICD-10-CM

## 2020-07-06 DIAGNOSIS — Z36.9 ENCOUNTER FOR FETAL ULTRASOUND: ICD-10-CM

## 2020-07-06 DIAGNOSIS — D57.219 SICKLE-CELL-HEMOGLOBIN C DISEASE WITH CRISIS: ICD-10-CM

## 2020-07-06 DIAGNOSIS — D57.00 SICKLE CELL PAIN CRISIS: ICD-10-CM

## 2020-07-06 PROCEDURE — 99213 OFFICE O/P EST LOW 20 MIN: CPT | Mod: S$PBB,25,TH, | Performed by: OBSTETRICS & GYNECOLOGY

## 2020-07-06 PROCEDURE — 76819 FETAL BIOPHYS PROFIL W/O NST: CPT | Mod: PBBFAC | Performed by: OBSTETRICS & GYNECOLOGY

## 2020-07-06 PROCEDURE — 99213 OFFICE O/P EST LOW 20 MIN: CPT | Mod: PBBFAC | Performed by: OBSTETRICS & GYNECOLOGY

## 2020-07-06 PROCEDURE — 99213 PR OFFICE/OUTPT VISIT, EST, LEVL III, 20-29 MIN: ICD-10-PCS | Mod: S$PBB,25,TH, | Performed by: OBSTETRICS & GYNECOLOGY

## 2020-07-06 PROCEDURE — 76816 OB US FOLLOW-UP PER FETUS: CPT | Mod: 26,S$PBB,, | Performed by: OBSTETRICS & GYNECOLOGY

## 2020-07-06 PROCEDURE — 76819 FETAL BIOPHYS PROFIL W/O NST: CPT | Mod: 26,S$PBB,, | Performed by: OBSTETRICS & GYNECOLOGY

## 2020-07-06 PROCEDURE — 99999 PR PBB SHADOW E&M-EST. PATIENT-LVL III: ICD-10-PCS | Mod: PBBFAC,,, | Performed by: OBSTETRICS & GYNECOLOGY

## 2020-07-06 PROCEDURE — 99999 PR PBB SHADOW E&M-EST. PATIENT-LVL III: CPT | Mod: PBBFAC,,, | Performed by: OBSTETRICS & GYNECOLOGY

## 2020-07-06 PROCEDURE — 76816 PR  US,PREGNANT UTERUS,F/U,TRANSABD APP: ICD-10-PCS | Mod: 26,S$PBB,, | Performed by: OBSTETRICS & GYNECOLOGY

## 2020-07-06 PROCEDURE — 76816 OB US FOLLOW-UP PER FETUS: CPT | Mod: PBBFAC | Performed by: OBSTETRICS & GYNECOLOGY

## 2020-07-06 PROCEDURE — 76819 PR US, OB, FETAL BIOPHYSICAL, W/O NST: ICD-10-PCS | Mod: 26,S$PBB,, | Performed by: OBSTETRICS & GYNECOLOGY

## 2020-07-06 NOTE — PROGRESS NOTES
See viewpoint US report  Patient states she is scheduled at 39 weeks for C/S.    She will discuss earlier delivery with her primary.

## 2020-07-07 DIAGNOSIS — D57.00 HB-SS DISEASE WITH CRISIS: Primary | ICD-10-CM

## 2020-07-09 ENCOUNTER — HOSPITAL ENCOUNTER (OUTPATIENT)
Dept: PERINATAL CARE | Facility: OTHER | Age: 25
Discharge: HOME | End: 2020-07-09
Attending: OBSTETRICS & GYNECOLOGY
Payer: MEDICAID

## 2020-07-09 DIAGNOSIS — D57.219 SICKLE-CELL-HEMOGLOBIN C DISEASE WITH CRISIS: ICD-10-CM

## 2020-07-09 PROCEDURE — 59025 PR FETAL 2N-STRESS TEST: ICD-10-PCS | Mod: 26,,, | Performed by: OBSTETRICS & GYNECOLOGY

## 2020-07-09 PROCEDURE — 59025 FETAL NON-STRESS TEST: CPT | Mod: 26,,, | Performed by: OBSTETRICS & GYNECOLOGY

## 2020-07-09 PROCEDURE — 76815 OB US LIMITED FETUS(S): CPT

## 2020-07-09 PROCEDURE — 59025 FETAL NON-STRESS TEST: CPT

## 2020-07-10 ENCOUNTER — ANESTHESIA EVENT (OUTPATIENT)
Dept: OBSTETRICS AND GYNECOLOGY | Facility: OTHER | Age: 25
End: 2020-07-10
Payer: MEDICAID

## 2020-07-10 ENCOUNTER — HOSPITAL ENCOUNTER (INPATIENT)
Facility: OTHER | Age: 25
LOS: 2 days | Discharge: HOME | End: 2020-07-12
Attending: OBSTETRICS & GYNECOLOGY | Admitting: OBSTETRICS & GYNECOLOGY
Payer: MEDICAID

## 2020-07-10 ENCOUNTER — ANESTHESIA (OUTPATIENT)
Dept: OBSTETRICS AND GYNECOLOGY | Facility: OTHER | Age: 25
End: 2020-07-10
Payer: MEDICAID

## 2020-07-10 DIAGNOSIS — Z98.891 S/P REPEAT LOW TRANSVERSE C-SECTION: Primary | ICD-10-CM

## 2020-07-10 LAB
ABO + RH BLD: NORMAL
BASOPHILS # BLD AUTO: 0.02 K/UL (ref 0–0.2)
BASOPHILS # BLD AUTO: 0.02 K/UL (ref 0–0.2)
BASOPHILS NFR BLD: 0.2 % (ref 0–1.9)
BASOPHILS NFR BLD: 0.3 % (ref 0–1.9)
BLD GP AB SCN CELLS X3 SERPL QL: NORMAL
DIFFERENTIAL METHOD: ABNORMAL
DIFFERENTIAL METHOD: ABNORMAL
EOSINOPHIL # BLD AUTO: 0 K/UL (ref 0–0.5)
EOSINOPHIL # BLD AUTO: 0 K/UL (ref 0–0.5)
EOSINOPHIL NFR BLD: 0 % (ref 0–8)
EOSINOPHIL NFR BLD: 0.3 % (ref 0–8)
ERYTHROCYTE [DISTWIDTH] IN BLOOD BY AUTOMATED COUNT: 18.1 % (ref 11.5–14.5)
ERYTHROCYTE [DISTWIDTH] IN BLOOD BY AUTOMATED COUNT: 18.5 % (ref 11.5–14.5)
GIANT PLATELETS BLD QL SMEAR: PRESENT
HCT VFR BLD AUTO: 27.2 % (ref 37–48.5)
HCT VFR BLD AUTO: 27.9 % (ref 37–48.5)
HGB BLD-MCNC: 9.2 G/DL (ref 12–16)
HGB BLD-MCNC: 9.2 G/DL (ref 12–16)
IMM GRANULOCYTES # BLD AUTO: 0.07 K/UL (ref 0–0.04)
IMM GRANULOCYTES # BLD AUTO: 0.08 K/UL (ref 0–0.04)
IMM GRANULOCYTES NFR BLD AUTO: 0.7 % (ref 0–0.5)
IMM GRANULOCYTES NFR BLD AUTO: 1 % (ref 0–0.5)
LYMPHOCYTES # BLD AUTO: 0.6 K/UL (ref 1–4.8)
LYMPHOCYTES # BLD AUTO: 0.9 K/UL (ref 1–4.8)
LYMPHOCYTES NFR BLD: 11.6 % (ref 18–48)
LYMPHOCYTES NFR BLD: 5.8 % (ref 18–48)
MCH RBC QN AUTO: 29.6 PG (ref 27–31)
MCH RBC QN AUTO: 30.1 PG (ref 27–31)
MCHC RBC AUTO-ENTMCNC: 33 G/DL (ref 32–36)
MCHC RBC AUTO-ENTMCNC: 33.8 G/DL (ref 32–36)
MCV RBC AUTO: 89 FL (ref 82–98)
MCV RBC AUTO: 90 FL (ref 82–98)
MONOCYTES # BLD AUTO: 0.2 K/UL (ref 0.3–1)
MONOCYTES # BLD AUTO: 0.4 K/UL (ref 0.3–1)
MONOCYTES NFR BLD: 1.4 % (ref 4–15)
MONOCYTES NFR BLD: 5.1 % (ref 4–15)
NEUTROPHILS # BLD AUTO: 6.4 K/UL (ref 1.8–7.7)
NEUTROPHILS # BLD AUTO: 9.7 K/UL (ref 1.8–7.7)
NEUTROPHILS NFR BLD: 81.7 % (ref 38–73)
NEUTROPHILS NFR BLD: 91.9 % (ref 38–73)
NRBC BLD-RTO: 0 /100 WBC
NRBC BLD-RTO: 0 /100 WBC
PLATELET # BLD AUTO: 113 K/UL (ref 150–350)
PLATELET # BLD AUTO: 116 K/UL (ref 150–350)
PLATELET BLD QL SMEAR: ABNORMAL
PMV BLD AUTO: 12.7 FL (ref 9.2–12.9)
PMV BLD AUTO: ABNORMAL FL (ref 9.2–12.9)
RBC # BLD AUTO: 3.06 M/UL (ref 4–5.4)
RBC # BLD AUTO: 3.11 M/UL (ref 4–5.4)
RPR SER QL: NORMAL
SARS-COV-2 RDRP RESP QL NAA+PROBE: NEGATIVE
WBC # BLD AUTO: 10.54 K/UL (ref 3.9–12.7)
WBC # BLD AUTO: 7.84 K/UL (ref 3.9–12.7)

## 2020-07-10 PROCEDURE — 86703 HIV-1/HIV-2 1 RESULT ANTBDY: CPT

## 2020-07-10 PROCEDURE — 59514 PRA REAN DELIVERY ONLY: ICD-10-PCS | Mod: ,,, | Performed by: ANESTHESIOLOGY

## 2020-07-10 PROCEDURE — 37000009 HC ANESTHESIA EA ADD 15 MINS: Performed by: OBSTETRICS & GYNECOLOGY

## 2020-07-10 PROCEDURE — 63600175 PHARM REV CODE 636 W HCPCS: Performed by: STUDENT IN AN ORGANIZED HEALTH CARE EDUCATION/TRAINING PROGRAM

## 2020-07-10 PROCEDURE — 51702 INSERT TEMP BLADDER CATH: CPT

## 2020-07-10 PROCEDURE — 86920 COMPATIBILITY TEST SPIN: CPT

## 2020-07-10 PROCEDURE — 36004725 HC OB OR TIME LEV III - EA ADD 15 MIN: Performed by: OBSTETRICS & GYNECOLOGY

## 2020-07-10 PROCEDURE — 25000003 PHARM REV CODE 250: Performed by: STUDENT IN AN ORGANIZED HEALTH CARE EDUCATION/TRAINING PROGRAM

## 2020-07-10 PROCEDURE — 11000001 HC ACUTE MED/SURG PRIVATE ROOM

## 2020-07-10 PROCEDURE — 86850 RBC ANTIBODY SCREEN: CPT

## 2020-07-10 PROCEDURE — 85025 COMPLETE CBC W/AUTO DIFF WBC: CPT | Mod: 91

## 2020-07-10 PROCEDURE — C1751 CATH, INF, PER/CENT/MIDLINE: HCPCS | Performed by: ANESTHESIOLOGY

## 2020-07-10 PROCEDURE — 59514 PR CESAREAN DELIVERY ONLY: ICD-10-PCS | Mod: AT,,, | Performed by: OBSTETRICS & GYNECOLOGY

## 2020-07-10 PROCEDURE — 59514 CESAREAN DELIVERY ONLY: CPT | Mod: ,,, | Performed by: ANESTHESIOLOGY

## 2020-07-10 PROCEDURE — 36415 COLL VENOUS BLD VENIPUNCTURE: CPT

## 2020-07-10 PROCEDURE — 86592 SYPHILIS TEST NON-TREP QUAL: CPT

## 2020-07-10 PROCEDURE — 71000039 HC RECOVERY, EACH ADD'L HOUR: Performed by: OBSTETRICS & GYNECOLOGY

## 2020-07-10 PROCEDURE — 59514 CESAREAN DELIVERY ONLY: CPT | Mod: AT,,, | Performed by: OBSTETRICS & GYNECOLOGY

## 2020-07-10 PROCEDURE — 37000008 HC ANESTHESIA 1ST 15 MINUTES: Performed by: OBSTETRICS & GYNECOLOGY

## 2020-07-10 PROCEDURE — 72200005 HC VAGINAL DELIVERY LEVEL II

## 2020-07-10 PROCEDURE — 36004724 HC OB OR TIME LEV III - 1ST 15 MIN: Performed by: OBSTETRICS & GYNECOLOGY

## 2020-07-10 PROCEDURE — U0002 COVID-19 LAB TEST NON-CDC: HCPCS

## 2020-07-10 PROCEDURE — S0028 INJECTION, FAMOTIDINE, 20 MG: HCPCS | Performed by: STUDENT IN AN ORGANIZED HEALTH CARE EDUCATION/TRAINING PROGRAM

## 2020-07-10 PROCEDURE — 71000033 HC RECOVERY, INTIAL HOUR: Performed by: OBSTETRICS & GYNECOLOGY

## 2020-07-10 RX ORDER — MISOPROSTOL 200 UG/1
800 TABLET ORAL ONCE AS NEEDED
Status: DISCONTINUED | OUTPATIENT
Start: 2020-07-10 | End: 2020-07-12 | Stop reason: HOSPADM

## 2020-07-10 RX ORDER — OXYTOCIN/RINGER'S LACTATE 30/500 ML
95 PLASTIC BAG, INJECTION (ML) INTRAVENOUS ONCE
Status: COMPLETED | OUTPATIENT
Start: 2020-07-10 | End: 2020-07-10

## 2020-07-10 RX ORDER — ONDANSETRON HYDROCHLORIDE 2 MG/ML
INJECTION, SOLUTION INTRAMUSCULAR; INTRAVENOUS
Status: DISCONTINUED | OUTPATIENT
Start: 2020-07-10 | End: 2020-07-10

## 2020-07-10 RX ORDER — ENOXAPARIN SODIUM 100 MG/ML
60 INJECTION SUBCUTANEOUS
Status: DISCONTINUED | OUTPATIENT
Start: 2020-07-11 | End: 2020-07-12 | Stop reason: HOSPADM

## 2020-07-10 RX ORDER — MIDAZOLAM HYDROCHLORIDE 1 MG/ML
INJECTION, SOLUTION INTRAMUSCULAR; INTRAVENOUS
Status: DISCONTINUED | OUTPATIENT
Start: 2020-07-10 | End: 2020-07-10

## 2020-07-10 RX ORDER — FAMOTIDINE 10 MG/ML
20 INJECTION INTRAVENOUS
Status: COMPLETED | OUTPATIENT
Start: 2020-07-10 | End: 2020-07-10

## 2020-07-10 RX ORDER — DEXAMETHASONE SODIUM PHOSPHATE 4 MG/ML
INJECTION, SOLUTION INTRA-ARTICULAR; INTRALESIONAL; INTRAMUSCULAR; INTRAVENOUS; SOFT TISSUE
Status: DISCONTINUED | OUTPATIENT
Start: 2020-07-10 | End: 2020-07-10

## 2020-07-10 RX ORDER — DIPHENHYDRAMINE HCL 25 MG
25 CAPSULE ORAL EVERY 6 HOURS PRN
Status: DISCONTINUED | OUTPATIENT
Start: 2020-07-10 | End: 2020-07-12 | Stop reason: HOSPADM

## 2020-07-10 RX ORDER — PRENATAL WITH FERROUS FUM AND FOLIC ACID 3080; 920; 120; 400; 22; 1.84; 3; 20; 10; 1; 12; 200; 27; 25; 2 [IU]/1; [IU]/1; MG/1; [IU]/1; MG/1; MG/1; MG/1; MG/1; MG/1; MG/1; UG/1; MG/1; MG/1; MG/1; MG/1
1 TABLET ORAL DAILY
Status: DISCONTINUED | OUTPATIENT
Start: 2020-07-10 | End: 2020-07-12 | Stop reason: HOSPADM

## 2020-07-10 RX ORDER — ONDANSETRON 2 MG/ML
4 INJECTION INTRAMUSCULAR; INTRAVENOUS EVERY 6 HOURS PRN
Status: DISCONTINUED | OUTPATIENT
Start: 2020-07-10 | End: 2020-07-10

## 2020-07-10 RX ORDER — HYDROCORTISONE 25 MG/G
CREAM TOPICAL 3 TIMES DAILY PRN
Status: DISCONTINUED | OUTPATIENT
Start: 2020-07-10 | End: 2020-07-12 | Stop reason: HOSPADM

## 2020-07-10 RX ORDER — OXYTOCIN 10 [USP'U]/ML
INJECTION, SOLUTION INTRAMUSCULAR; INTRAVENOUS
Status: DISCONTINUED | OUTPATIENT
Start: 2020-07-10 | End: 2020-07-10

## 2020-07-10 RX ORDER — ACETAMINOPHEN 325 MG/1
650 TABLET ORAL
Status: DISCONTINUED | OUTPATIENT
Start: 2020-07-10 | End: 2020-07-12 | Stop reason: HOSPADM

## 2020-07-10 RX ORDER — ONDANSETRON 2 MG/ML
4 INJECTION INTRAMUSCULAR; INTRAVENOUS ONCE
Status: COMPLETED | OUTPATIENT
Start: 2020-07-10 | End: 2020-07-10

## 2020-07-10 RX ORDER — SODIUM CITRATE AND CITRIC ACID MONOHYDRATE 334; 500 MG/5ML; MG/5ML
30 SOLUTION ORAL
Status: COMPLETED | OUTPATIENT
Start: 2020-07-10 | End: 2020-07-10

## 2020-07-10 RX ORDER — SODIUM CHLORIDE, SODIUM LACTATE, POTASSIUM CHLORIDE, CALCIUM CHLORIDE 600; 310; 30; 20 MG/100ML; MG/100ML; MG/100ML; MG/100ML
INJECTION, SOLUTION INTRAVENOUS CONTINUOUS PRN
Status: DISCONTINUED | OUTPATIENT
Start: 2020-07-10 | End: 2020-07-10

## 2020-07-10 RX ORDER — CARBOPROST TROMETHAMINE 250 UG/ML
250 INJECTION, SOLUTION INTRAMUSCULAR
Status: DISCONTINUED | OUTPATIENT
Start: 2020-07-10 | End: 2020-07-12 | Stop reason: HOSPADM

## 2020-07-10 RX ORDER — MORPHINE SULFATE 0.5 MG/ML
INJECTION, SOLUTION EPIDURAL; INTRATHECAL; INTRAVENOUS
Status: DISCONTINUED | OUTPATIENT
Start: 2020-07-10 | End: 2020-07-10

## 2020-07-10 RX ORDER — DOCUSATE SODIUM 100 MG/1
200 CAPSULE, LIQUID FILLED ORAL 2 TIMES DAILY
Status: DISCONTINUED | OUTPATIENT
Start: 2020-07-10 | End: 2020-07-12 | Stop reason: HOSPADM

## 2020-07-10 RX ORDER — SIMETHICONE 80 MG
1 TABLET,CHEWABLE ORAL EVERY 6 HOURS PRN
Status: DISCONTINUED | OUTPATIENT
Start: 2020-07-10 | End: 2020-07-12 | Stop reason: HOSPADM

## 2020-07-10 RX ORDER — SODIUM CHLORIDE, SODIUM LACTATE, POTASSIUM CHLORIDE, CALCIUM CHLORIDE 600; 310; 30; 20 MG/100ML; MG/100ML; MG/100ML; MG/100ML
INJECTION, SOLUTION INTRAVENOUS CONTINUOUS
Status: DISCONTINUED | OUTPATIENT
Start: 2020-07-10 | End: 2020-07-12 | Stop reason: HOSPADM

## 2020-07-10 RX ORDER — ONDANSETRON 2 MG/ML
4 INJECTION INTRAMUSCULAR; INTRAVENOUS EVERY 6 HOURS PRN
Status: DISCONTINUED | OUTPATIENT
Start: 2020-07-10 | End: 2020-07-12 | Stop reason: HOSPADM

## 2020-07-10 RX ORDER — NALBUPHINE HYDROCHLORIDE 10 MG/ML
5 INJECTION, SOLUTION INTRAMUSCULAR; INTRAVENOUS; SUBCUTANEOUS ONCE AS NEEDED
Status: DISCONTINUED | OUTPATIENT
Start: 2020-07-10 | End: 2020-07-12 | Stop reason: HOSPADM

## 2020-07-10 RX ORDER — HYDROMORPHONE HYDROCHLORIDE 2 MG/ML
0.2 INJECTION, SOLUTION INTRAMUSCULAR; INTRAVENOUS; SUBCUTANEOUS EVERY 5 MIN PRN
Status: DISCONTINUED | OUTPATIENT
Start: 2020-07-10 | End: 2020-07-12 | Stop reason: HOSPADM

## 2020-07-10 RX ORDER — OXYCODONE HYDROCHLORIDE 5 MG/1
10 TABLET ORAL EVERY 4 HOURS PRN
Status: DISCONTINUED | OUTPATIENT
Start: 2020-07-10 | End: 2020-07-12 | Stop reason: HOSPADM

## 2020-07-10 RX ORDER — LIDOCAINE HCL/EPINEPHRINE/PF 2%-1:200K
VIAL (ML) INJECTION
Status: DISCONTINUED | OUTPATIENT
Start: 2020-07-10 | End: 2020-07-10

## 2020-07-10 RX ORDER — ACETAMINOPHEN 500 MG
1000 TABLET ORAL
Status: COMPLETED | OUTPATIENT
Start: 2020-07-10 | End: 2020-07-10

## 2020-07-10 RX ORDER — METHYLERGONOVINE MALEATE 0.2 MG/ML
200 INJECTION INTRAVENOUS ONCE AS NEEDED
Status: DISCONTINUED | OUTPATIENT
Start: 2020-07-10 | End: 2020-07-12 | Stop reason: HOSPADM

## 2020-07-10 RX ORDER — BUPIVACAINE HYDROCHLORIDE 7.5 MG/ML
INJECTION, SOLUTION INTRASPINAL
Status: DISCONTINUED | OUTPATIENT
Start: 2020-07-10 | End: 2020-07-10

## 2020-07-10 RX ORDER — LIDOCAINE HCL/EPINEPHRINE/PF 2%-1:200K
VIAL (ML) INJECTION CONTINUOUS PRN
Status: DISCONTINUED | OUTPATIENT
Start: 2020-07-10 | End: 2020-07-10

## 2020-07-10 RX ORDER — FENTANYL CITRATE 50 UG/ML
INJECTION, SOLUTION INTRAMUSCULAR; INTRAVENOUS
Status: DISCONTINUED | OUTPATIENT
Start: 2020-07-10 | End: 2020-07-10

## 2020-07-10 RX ORDER — KETOROLAC TROMETHAMINE 30 MG/ML
30 INJECTION, SOLUTION INTRAMUSCULAR; INTRAVENOUS
Status: COMPLETED | OUTPATIENT
Start: 2020-07-10 | End: 2020-07-11

## 2020-07-10 RX ORDER — OXYTOCIN/RINGER'S LACTATE 30/500 ML
334 PLASTIC BAG, INJECTION (ML) INTRAVENOUS ONCE
Status: DISCONTINUED | OUTPATIENT
Start: 2020-07-10 | End: 2020-07-12 | Stop reason: HOSPADM

## 2020-07-10 RX ORDER — IBUPROFEN 400 MG/1
800 TABLET ORAL
Status: DISCONTINUED | OUTPATIENT
Start: 2020-07-11 | End: 2020-07-12 | Stop reason: HOSPADM

## 2020-07-10 RX ORDER — HYDROCODONE BITARTRATE AND ACETAMINOPHEN 500; 5 MG/1; MG/1
TABLET ORAL
Status: DISCONTINUED | OUTPATIENT
Start: 2020-07-10 | End: 2020-07-12 | Stop reason: HOSPADM

## 2020-07-10 RX ORDER — KETAMINE HYDROCHLORIDE 50 MG/ML
INJECTION, SOLUTION INTRAMUSCULAR; INTRAVENOUS
Status: DISCONTINUED | OUTPATIENT
Start: 2020-07-10 | End: 2020-07-10

## 2020-07-10 RX ORDER — OXYCODONE HYDROCHLORIDE 5 MG/1
5 TABLET ORAL EVERY 4 HOURS PRN
Status: DISCONTINUED | OUTPATIENT
Start: 2020-07-10 | End: 2020-07-12 | Stop reason: HOSPADM

## 2020-07-10 RX ORDER — AMOXICILLIN 250 MG
1 CAPSULE ORAL NIGHTLY PRN
Status: DISCONTINUED | OUTPATIENT
Start: 2020-07-10 | End: 2020-07-12 | Stop reason: HOSPADM

## 2020-07-10 RX ORDER — SODIUM CHLORIDE 0.9 % (FLUSH) 0.9 %
10 SYRINGE (ML) INJECTION
Status: DISCONTINUED | OUTPATIENT
Start: 2020-07-10 | End: 2020-07-12 | Stop reason: HOSPADM

## 2020-07-10 RX ORDER — CEFAZOLIN SODIUM 2 G/50ML
2 SOLUTION INTRAVENOUS
Status: COMPLETED | OUTPATIENT
Start: 2020-07-10 | End: 2020-07-10

## 2020-07-10 RX ORDER — DIPHENHYDRAMINE HYDROCHLORIDE 50 MG/ML
12.5 INJECTION INTRAMUSCULAR; INTRAVENOUS
Status: COMPLETED | OUTPATIENT
Start: 2020-07-10 | End: 2020-07-11

## 2020-07-10 RX ORDER — ONDANSETRON 8 MG/1
8 TABLET, ORALLY DISINTEGRATING ORAL EVERY 8 HOURS PRN
Status: DISCONTINUED | OUTPATIENT
Start: 2020-07-10 | End: 2020-07-12 | Stop reason: HOSPADM

## 2020-07-10 RX ADMIN — ACETAMINOPHEN 650 MG: 325 TABLET ORAL at 09:07

## 2020-07-10 RX ADMIN — OXYCODONE 10 MG: 5 TABLET ORAL at 04:07

## 2020-07-10 RX ADMIN — FAMOTIDINE 20 MG: 10 INJECTION INTRAVENOUS at 12:07

## 2020-07-10 RX ADMIN — SODIUM CHLORIDE, SODIUM LACTATE, POTASSIUM CHLORIDE, AND CALCIUM CHLORIDE: .6; .31; .03; .02 INJECTION, SOLUTION INTRAVENOUS at 11:07

## 2020-07-10 RX ADMIN — MIDAZOLAM HYDROCHLORIDE 1 MG: 1 INJECTION, SOLUTION INTRAMUSCULAR; INTRAVENOUS at 03:07

## 2020-07-10 RX ADMIN — HYDROMORPHONE HYDROCHLORIDE 0.2 MG: 2 INJECTION, SOLUTION INTRAMUSCULAR; INTRAVENOUS; SUBCUTANEOUS at 06:07

## 2020-07-10 RX ADMIN — KETOROLAC TROMETHAMINE 30 MG: 30 INJECTION, SOLUTION INTRAMUSCULAR at 09:07

## 2020-07-10 RX ADMIN — Medication 0.1 MG: at 02:07

## 2020-07-10 RX ADMIN — CEFAZOLIN SODIUM 1 G: 2 SOLUTION INTRAVENOUS at 02:07

## 2020-07-10 RX ADMIN — DEXAMETHASONE SODIUM PHOSPHATE 4 MG: 4 INJECTION, SOLUTION INTRAMUSCULAR; INTRAVENOUS at 02:07

## 2020-07-10 RX ADMIN — LIDOCAINE HYDROCHLORIDE,EPINEPHRINE BITARTRATE 5 ML: 20; .005 INJECTION, SOLUTION EPIDURAL; INFILTRATION; INTRACAUDAL; PERINEURAL at 03:07

## 2020-07-10 RX ADMIN — Medication 95 MILLI-UNITS/MIN: at 03:07

## 2020-07-10 RX ADMIN — PROMETHAZINE HYDROCHLORIDE 6.25 MG: 25 INJECTION INTRAMUSCULAR; INTRAVENOUS at 03:07

## 2020-07-10 RX ADMIN — ONDANSETRON 4 MG: 2 INJECTION INTRAMUSCULAR; INTRAVENOUS at 08:07

## 2020-07-10 RX ADMIN — SODIUM CITRATE AND CITRIC ACID MONOHYDRATE 30 ML: 500; 334 SOLUTION ORAL at 12:07

## 2020-07-10 RX ADMIN — BUPIVACAINE HYDROCHLORIDE IN DEXTROSE 1.6 ML: 7.5 INJECTION, SOLUTION SUBARACHNOID at 02:07

## 2020-07-10 RX ADMIN — ONDANSETRON 4 MG: 2 INJECTION INTRAMUSCULAR; INTRAVENOUS at 05:07

## 2020-07-10 RX ADMIN — KETOROLAC TROMETHAMINE 30 MG: 30 INJECTION, SOLUTION INTRAMUSCULAR at 03:07

## 2020-07-10 RX ADMIN — OXYTOCIN 3 UNITS: 10 INJECTION, SOLUTION INTRAMUSCULAR; INTRAVENOUS at 02:07

## 2020-07-10 RX ADMIN — SODIUM CHLORIDE, SODIUM LACTATE, POTASSIUM CHLORIDE, AND CALCIUM CHLORIDE: 600; 310; 30; 20 INJECTION, SOLUTION INTRAVENOUS at 02:07

## 2020-07-10 RX ADMIN — PHENYLEPHRINE HYDROCHLORIDE 50 MCG/MIN: 10 INJECTION INTRAVENOUS at 02:07

## 2020-07-10 RX ADMIN — ONDANSETRON 4 MG: 2 INJECTION, SOLUTION INTRAMUSCULAR; INTRAVENOUS at 02:07

## 2020-07-10 RX ADMIN — KETAMINE HYDROCHLORIDE 20 MG: 50 INJECTION INTRAMUSCULAR; INTRAVENOUS at 03:07

## 2020-07-10 RX ADMIN — LIDOCAINE HYDROCHLORIDE,EPINEPHRINE BITARTRATE 5 ML: 20; .005 INJECTION, SOLUTION EPIDURAL; INFILTRATION; INTRACAUDAL; PERINEURAL at 02:07

## 2020-07-10 RX ADMIN — DOCUSATE SODIUM 200 MG: 100 CAPSULE, LIQUID FILLED ORAL at 09:07

## 2020-07-10 RX ADMIN — FENTANYL CITRATE 10 MCG: 50 INJECTION, SOLUTION INTRAMUSCULAR; INTRAVENOUS at 02:07

## 2020-07-10 RX ADMIN — ACETAMINOPHEN 1000 MG: 500 TABLET ORAL at 12:07

## 2020-07-10 RX ADMIN — DIPHENHYDRAMINE HYDROCHLORIDE 12.5 MG: 50 INJECTION, SOLUTION INTRAMUSCULAR; INTRAVENOUS at 09:07

## 2020-07-10 RX ADMIN — SODIUM CHLORIDE, SODIUM LACTATE, POTASSIUM CHLORIDE, AND CALCIUM CHLORIDE: .6; .31; .03; .02 INJECTION, SOLUTION INTRAVENOUS at 01:07

## 2020-07-10 RX ADMIN — SODIUM CHLORIDE, SODIUM LACTATE, POTASSIUM CHLORIDE, AND CALCIUM CHLORIDE: .6; .31; .03; .02 INJECTION, SOLUTION INTRAVENOUS at 08:07

## 2020-07-10 RX ADMIN — FENTANYL CITRATE 50 MCG: 50 INJECTION, SOLUTION INTRAMUSCULAR; INTRAVENOUS at 03:07

## 2020-07-10 NOTE — L&D DELIVERY NOTE
Section Operative Note  Procedure Date: 7/10/20    Procedure: repeat  Section via Pfannenstiel skin incision    Indications:  1. History of  delivery x 2  2. Hemoglobin SC disease, with multiple pain crises     Post-operative Diagnosis: same    Surgeon: Zoila Donaldson MD     Assistants: Carli Freitas, PGY4  Katalina Dickinson, PGY1    Anesthesia: Epidural anesthesia    Findings:   1. Extensive adhesions between the rectus muscle and the fascia.  2. Normal appearing tubes, ovaries bilaterally  3. Normal appearing uterus  4. Viable male infant with apgars of 9,9     Estimated Blood Loss:  470           Total IV Fluids: 1500 mL     UOP: 200 mL    Specimens:   1. Placenta which was discarded    PreOp CBC:   Lab Results   Component Value Date    WBC 7.84 07/10/2020    HGB 9.2 (L) 07/10/2020    HCT 27.9 (L) 07/10/2020    MCV 90 07/10/2020     (L) 07/10/2020                     Complications:  None; patient tolerated the procedure well.           Disposition: PACU - hemodynamically stable.           Condition: stable    Procedure Details   The patient was seen in the Holding Room. The risks, benefits, complications, treatment options, and expected outcomes were discussed with the patient.  The patient concurred with the proposed plan, giving informed consent.  The site of surgery properly noted/marked. The patient was taken to Operating Room, identified as Lenidoronadriana Tarango and the procedure verified as repeat  Delivery. A Time Out was held and the above information confirmed.    After induction of anesthesia, the patient was prepped and draped in the usual sterile manner while placed in a dorsal supine position with a left lateral tilt.  A marley catheter was also placed per nursing. Preoperative antibiotics Ancef were administered and an allis test was performed yielding adequate anesthesia.  A Pfannenstiel incision was made and carried down through the subcutaneous tissue  to the fascia. Fascial incision was made and extended transversely. The fascia was grasped with Kocher clamps and  from the underlying rectus tissue superiorly and inferiorly. Extensive adhesions were present between the fascia and muscle and were dissected down sharply. The peritoneum was grossly adherent to the rectus muscle, identified, found to be free of adherent bowl and entered sharply. Peritoneal incision was extended longitudinally. The vesico-uterine peritoneum was identified and bladder blade was inserted. The vesico-uterine peritoneum was incised transversely and the bladder flap was bluntly freed from the lower uterine segment. The bladder blade was reinserted to keep the bladder out of the operative field. A low transverse uterine incision was made with knife and extended with finger fraction. The amniotic sac was ruptured sharply and the infant was noted to be in cephalic position. The head was brought to the incision and elevated out of the pelvis. The patient delivered a single viable male infant without difficulty.  Infant weighed 3320 grams with Apgar scores of 9/9 at one and five minutes respectively. After the umbilical cord was clamped and cut cord blood was obtained for evaluation. The placenta was removed intact and appeared normal and was discarded. The uterus was exteriorized. The uterine outline, tubes and ovaries appeared normal. The uterine incision was closed with running locked sutures of 0-vicryl and an imbricating layer was placed using #0 Vicryl. Hemostasis was observed. The uterus was returned to the abdominal cavity. Incision was reinspected and good hemostasis was noted. A moist lap was used to remove clots behind the uterus. The fascia was then reapproximated with running sutures of 1-PDS. The skin was reapproximated with 4-0 Monocryl.    Instrument, sponge, and needle counts were correct prior the abdominal closure and at the conclusion of the case.     Pt tolerated  procedure well.     Katalina Dickinson MD  OBGYN PGY-1    I was present and scrubbed for the entire procedure until closure of the skin. I was immediately available if needed.     Zoila Donaldson MD  Obstetrics and Gynecology  [facility]

## 2020-07-10 NOTE — ANESTHESIA PROCEDURE NOTES
CSE    Patient location during procedure: OR  Start time: 7/10/2020 2:05 PM  Timeout: 7/10/2020 2:00 PM  End time: 7/10/2020 2:15 PM    Staffing  Authorizing Provider: Joycelyn Lawrence MD  Performing Provider: Kennedy Leyva MD    Preanesthetic Checklist  Completed: patient identified, site marked, surgical consent, pre-op evaluation, timeout performed, IV checked, risks and benefits discussed and monitors and equipment checked  CSE  Patient position: sitting  Prep: ChloraPrep  Patient monitoring: continuous pulse ox and frequent blood pressure checks  Spinal Needle  Needle type: pencil-tip   Needle gauge: 25 G  Needle length: 5 in  Epidural Needle  Injection technique: EMMY saline  Needle type: Tuohy   Needle gauge: 17 G  Needle length: 3.5 in  Needle insertion depth: 6.5 cm  Location: L4-5  Needle localization: anatomical landmarks  Catheter  Catheter type: [employer]Library  Catheter size: 19 G  Catheter at skin depth: 10.5 cm  Assessment  Intrathecal Medications:  administered: primary anesthetic mcg of

## 2020-07-10 NOTE — ANESTHESIA PREPROCEDURE EVALUATION
[facility]  Anesthesia Pre-Operative Evaluation         Patient Name: Murali Tarango  YOB: 1995  MRN: [id number]    07/10/2020      Murali Tarango is a 24 y.o. female  @ 38w2d who presents with SS disease, thrombocytopenia, hx of TIAs, 2 prior C/S, 3rd trimester hospitalization due to SS crisis. Hb of 9.2 and plts 88 on 6/10/20.     OB History    Para Term  AB Living   3 2 2 0 0 2   SAB TAB Ectopic Multiple Live Births   0 0 0 0 2      # Outcome Date GA Lbr Rusty/2nd Weight Sex Delivery Anes PTL Lv   3 Current            2 Term 12/30/15    M    MELISSA   1 Term 14 37w3d   M CS-LTranv EPI  MELISSA      Obstetric Comments   G1 - [facility] (14) - C/S - emergency for FHR abnormalities. She was 4-5 cm.   G2 - [facility] (12/30/15) - C/S - elective repeat C/S given close interval pregnancy       Review of patient's allergies indicates:  No Known Allergies    Wt Readings from Last 1 Encounters:   20 1508 78.8 kg (173 lb 11.6 oz)       BP Readings from Last 3 Encounters:   20 108/68   20 118/80   20 (!) 118/56       Patient Active Problem List   Diagnosis    Hemoglobin S-C disease    Thrombocytopenia    Anemia due to chronic illness    Supervision of normal subsequent pregnancy    Sickle cell pain crisis    TIA (transient ischemic attack)    Avascular necrosis of femur head, left    History of  delivery    Coats' disease of left eye    Dyspnea on exertion    Maternal sickle cell anemia complicating pregnancy in second trimester    34 weeks gestation of pregnancy       Past Surgical History:   Procedure Laterality Date     SECTION  2014     delivery x 2    EYE SURGERY      left eye       Social History     Socioeconomic History    Marital status: Single     Spouse name: Not on file    Number of children: Not on file    Years of education: Not on file    Highest  education level: Not on file   Occupational History    Not on file   Social Needs    Financial resource strain: Not on file    Food insecurity     Worry: Not on file     Inability: Not on file    Transportation needs     Medical: Not on file     Non-medical: Not on file   Tobacco Use    Smoking status: Current Some Day Smoker     Types: Cigarettes    Smokeless tobacco: Never Used   Substance and Sexual Activity    Alcohol use: No    Drug use: No    Sexual activity: Yes     Partners: Male     Birth control/protection: None     Comment: single   Lifestyle    Physical activity     Days per week: Not on file     Minutes per session: Not on file    Stress: Not on file   Relationships    Social connections     Talks on phone: Not on file     Gets together: Not on file     Attends Bahai service: Not on file     Active member of club or organization: Not on file     Attends meetings of clubs or organizations: Not on file     Relationship status: Not on file   Other Topics Concern    Not on file   Social History Narrative    Not on file         Chemistry        Component Value Date/Time     06/09/2020 2245    K 3.6 06/09/2020 2245     06/09/2020 2245    CO2 18 (L) 06/09/2020 2245    BUN 4 (L) 06/09/2020 2245    CREATININE 0.7 06/10/2020 0425    GLU 93 06/09/2020 2245        Component Value Date/Time    CALCIUM 8.5 (L) 06/09/2020 2245    ALKPHOS 91 06/09/2020 2245    AST 18 06/09/2020 2245    ALT 10 06/09/2020 2245    BILITOT 0.9 06/09/2020 2245    ESTGFRAFRICA >60 06/10/2020 0425    EGFRNONAA >60 06/10/2020 0425            Lab Results   Component Value Date    WBC 5.60 06/10/2020    HGB 9.2 (L) 06/10/2020    HCT 27.0 (L) 06/10/2020    MCV 87 06/10/2020    PLT 88 (L) 06/10/2020       No results for input(s): PT, INR, PROTIME, APTT in the last 72 hours.        Anesthesia Evaluation    I have reviewed the Patient Summary Reports.    I have reviewed the Nursing Notes. I have reviewed the NPO Status.    I have reviewed the Medications.     Review of Systems  Anesthesia Hx:  No problems with previous Anesthesia Denies Hx of Anesthetic complications  History of prior surgery of interest to airway management or planning: Previous anesthesia: Epidural, Spinal  Denies Personal Hx of Anesthesia complications.   Social:  Non-Smoker, No Alcohol Use    Hematology/Oncology:     Oncology Normal    -- Anemia (Sickle cell anemia):   EENT/Dental:EENT/Dental Normal   Cardiovascular:  Cardiovascular Normal Exercise tolerance: good     Pulmonary:  Pulmonary Normal    Renal/:  Renal/ Normal     Hepatic/GI:  Hepatic/GI Normal    Musculoskeletal:  Musculoskeletal Normal No residual deficits   OB/GYN/PEDS:  2 prior C/S   Neurological:   TIA, CVA, no residual symptoms Left eye prothesis   Endocrine:  Endocrine Normal    Psych:  Psychiatric Normal           Physical Exam  General:  Well nourished    Airway/Jaw/Neck:  Airway Findings: Mouth Opening: Normal General Airway Assessment: Adult  Mallampati: II  Improves to I with phonation.  TM Distance: Normal, at least 6 cm  Jaw/Neck Findings:     Neck ROM: Normal ROM      Dental:  Dental Findings: In tact, Upper braces, Lower braces   Chest/Lungs:  Chest/Lungs Findings: Clear to auscultation, Normal Respiratory Rate     Heart/Vascular:  Heart Findings: Rate: Normal  Rhythm: Regular Rhythm  Sounds: Normal      Musculoskeletal:  Musculoskeletal Findings: 5/5 strength in upper and lower extremities  EOMI in right eye (left eye prothesis)       Mental Status:  Mental Status Findings:  Cooperative, Alert and Oriented         Anesthesia Plan  Type of Anesthesia, risks & benefits discussed:  Anesthesia Type:  CSE, epidural, general, spinal, MAC  Patient's Preference:   Intra-op Monitoring Plan: standard ASA monitors  Intra-op Monitoring Plan Comments:   Post Op Pain Control Plan: multimodal analgesia, per primary service following discharge from PACU and IV/PO Opioids PRN  Post Op Pain  Control Plan Comments:   Induction:    Beta Blocker:  Patient is not currently on a Beta-Blocker (No further documentation required).       Informed Consent: Patient understands risks and agrees with Anesthesia plan.  Questions answered. Anesthesia consent signed with patient.  ASA Score: 3     Day of Surgery Review of History & Physical:    H&P update referred to the provider.     Anesthesia Plan Notes: Plan for CSE  Preop labs          Ready For Surgery From Anesthesia Perspective.

## 2020-07-10 NOTE — TRANSFER OF CARE
"Anesthesia Transfer of Care Note    Patient: Murali Tarango    Procedure(s) Performed: Procedure(s) (LRB):   SECTION (N/A)    Patient location: Labor and Delivery    Anesthesia Type: CSE    Transport from OR: Transported from OR on room air with adequate spontaneous ventilation    Post pain: adequate analgesia    Post assessment: no apparent anesthetic complications    Post vital signs: stable    Level of consciousness: awake, alert and oriented    Nausea/Vomiting: no nausea/vomiting    Complications: none    Transfer of care protocol was followed      Last vitals:   Visit Vitals  BP (!) 102/59   Pulse 73   Temp 36.9 °C (98.4 °F) (Oral)   Resp 14   Ht 5' 5" (1.651 m)   Wt 78 kg (172 lb)   LMP 10/16/2019 (Exact Date)   Breastfeeding Unknown   BMI 28.62 kg/m²     "

## 2020-07-10 NOTE — H&P
HISTORY AND PHYSICAL                                                OBSTETRICS          Subjective:       Murali CHOI Emelina is a 24 y.o.  female with IUP at 38w2d weeks gestation who presents for scheduled RLTCS.    Patient denies contractions, denies vaginal bleeding, denies LOF.   Fetal Movement: normal.    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    Review of Systems   Constitutional: Negative for chills and fever.   Eyes: Negative for visual disturbance.   Respiratory: Negative for shortness of breath.    Cardiovascular: Negative for chest pain and palpitations.   Gastrointestinal: Negative for abdominal pain, constipation, diarrhea, nausea and vomiting.   Genitourinary: Negative for vaginal bleeding and vaginal discharge.   Musculoskeletal: Negative for back pain.   Integumentary:  Negative for rash.   Neurological: Negative for seizures, syncope and headaches.   Hematological: Does not bruise/bleed easily.   Psychiatric/Behavioral: Negative for depression. The patient is not nervous/anxious.      Patient denies  fever, denies cough denies SOB, denies chest pain,  denies diarrhea/abdominal pain , denies anosmia denies positive COVID test in the past 7 days.       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 150 tablet 2     enoxaparin (LOVENOX) 60 mg/0.6 mL Syrg Inject 0.6 mLs (60 mg total) into the skin once daily. 30 Syringe 10     ferrous sulfate (FEOSOL) 325 mg (65 mg iron) Tab tablet Take 1 tablet (325 mg total) by mouth once daily. 30 tablet 0     folic acid (FOLVITE) 1 MG tablet Take 4 tablets (4 mg total) by mouth once daily. 150 tablet 3     glutamine, bulk, (L-GLUTAMINE) Take 14 g by mouth 2 (two) times daily. (Patient not taking: Reported on 7/6/2020) 840 g 3     oxyCODONE (ROXICODONE) 5 MG immediate release tablet Take 1 tablet (5 mg total) by mouth every 6 (six) hours as needed. (Patient not taking: Reported on 7/2/2020) 30 tablet 0     prenatal vit calc,iron,folic (PRENATAL VITAMIN ORAL) Take by mouth.       promethazine (PHENERGAN) 6.25 mg/5 mL syrup Take 20 mLs (25 mg total) by mouth every 6 (six) hours as needed for Nausea. 118 mL 2        SH:   Social History     Socioeconomic History    Marital status: Single     Spouse name: Not on file    Number of children: Not on file    Years of education: Not on file    Highest education level: Not on file   Occupational History    Not on file   Social Needs    Financial resource strain: Not on file    Food insecurity     Worry: Not on file     Inability: Not on file    Transportation needs     Medical: Not on file     Non-medical: Not on file   Tobacco Use    Smoking status: Current Some Day Smoker     Types: Cigarettes    Smokeless tobacco: Never Used   Substance and Sexual Activity    Alcohol use: No    Drug use: No    Sexual activity: Yes     Partners: Male     Birth control/protection: None     Comment: single   Lifestyle    Physical activity     Days per week: Not on file     Minutes per session: Not on file    Stress: Not on file   Relationships    Social connections     Talks on phone: Not on file     Gets together: Not on file     Attends Restorationism service: Not on file     Active member of club or organization:  "Not on file     Attends meetings of clubs or organizations: Not on file     Relationship status: Not on file   Other Topics Concern    Not on file   Social History Narrative    Not on file       FH:   Family History   Problem Relation Age of Onset    Breast cancer Neg Hx     Colon cancer Neg Hx     Ovarian cancer Neg Hx     Cancer Neg Hx     Diabetes Neg Hx     Eclampsia Neg Hx     Hypertension Neg Hx     Miscarriages / Stillbirths Neg Hx      labor Neg Hx     Stroke Neg Hx        OBHx:   OB History    Para Term  AB Living   3 2 2 0 0 2   SAB TAB Ectopic Multiple Live Births   0 0 0 0 2      # Outcome Date GA Lbr Rusty/2nd Weight Sex Delivery Anes PTL Lv   3 Current            2 Term 12/30/15    M    MELISSA   1 Term 14 37w3d   M CS-LTranv EPI  MELISSA      Apgar1: 8  Apgar5: 8      Obstetric Comments   G1 - [facility] (14) - C/S - emergency for FHR abnormalities. She was 4-5 cm.   G2 - [facility] (12/30/15) - C/S - elective repeat C/S given close interval pregnancy       Objective:       Ht 5' 5" (1.651 m)   Wt 78 kg (172 lb)   LMP 10/16/2019 (Exact Date)   Breastfeeding No   BMI 28.62 kg/m²     Vitals:    07/10/20 1214   Weight: 78 kg (172 lb)   Height: 5' 5" (1.651 m)       General:   alert, appears stated age and cooperative, no apparent distress   HENT:  normocephalic, atraumatic   Eyes:  extraocular movements and conjunctivae normal   Neck:  supple, range of motion normal, no thyromegaly   Lungs:   no respiratory distress   Heart:   regular rate   Abdomen:  soft, non-tender, non-distended but gravid, no rebound or guarding    Extremities negative edema, negative erythema   FHT: 145, moderate BTBV, +accels, -decels;  Cat 1 (reassuring)                 TOCO: quiet   Presentations: cephalic by ultrasound   Cervix:     Deferred                         Sterile Speculum Exam: negative     EFW by Leopold's: 6#9    Recent Growth Scan: 3,330    59%  On 2020    Lab Review  Blood " Type O POS  GBBS: negative  Rubella: Immune  RPR: NR  HIV: negative  HepB: negative       Assessment:       38w2d weeks gestation with scheduled RLTCS    Active Hospital Problems    Diagnosis  POA    Indication for care or intervention related to labor and delivery [O75.9]  Yes      Resolved Hospital Problems   No resolved problems to display.          Plan:   Planned RLTCS   Risks, benefits, alternatives and possible complications have been discussed in detail with the patient.   - Consents signed and to chart  - Admit to Labor and Delivery unit  - Ancef OCTOR    - Epidural per Anesthesia  - Draw CBC, T&S  - Notify Staff  - Post-Partum Hemorrhage risk - medium    Sickle Cell Dx:  - per Heme/Onn cot on 1/3/2020: has been on lovenox 60 daily in pregnancy, recommend 81ASA pp. Will confirm with MFM if need lovenox pp  - normal Echo  - admit CBC pending  - 2uPRBC held   - continue folic acid pp   - aggressive Pain control    H/x of LTCS x 2  - will proceed with repeat LTCS     Hx of Stroke  - will confirm with MFM but likely restart lovenox 60 for 6 weeks pp    Carli Freitas M.D.  Obstetrics and Gynecology   PGY-4    I have reviewed and concur with the resident's history, physical assessment and plan.  I have personally interviewed and examined the patient at bedside.    Zoila Donaldson MD  Obstetrics and Gynecology  [facility]

## 2020-07-11 LAB
BASOPHILS # BLD AUTO: 0.01 K/UL (ref 0–0.2)
BASOPHILS NFR BLD: 0.1 % (ref 0–1.9)
DIFFERENTIAL METHOD: ABNORMAL
EOSINOPHIL # BLD AUTO: 0 K/UL (ref 0–0.5)
EOSINOPHIL NFR BLD: 0.2 % (ref 0–8)
ERYTHROCYTE [DISTWIDTH] IN BLOOD BY AUTOMATED COUNT: 18 % (ref 11.5–14.5)
HCT VFR BLD AUTO: 20.8 % (ref 37–48.5)
HGB BLD-MCNC: 7.2 G/DL (ref 12–16)
IMM GRANULOCYTES # BLD AUTO: 0.04 K/UL (ref 0–0.04)
IMM GRANULOCYTES NFR BLD AUTO: 0.5 % (ref 0–0.5)
LYMPHOCYTES # BLD AUTO: 1 K/UL (ref 1–4.8)
LYMPHOCYTES NFR BLD: 12.4 % (ref 18–48)
MCH RBC QN AUTO: 30.9 PG (ref 27–31)
MCHC RBC AUTO-ENTMCNC: 34.6 G/DL (ref 32–36)
MCV RBC AUTO: 89 FL (ref 82–98)
MONOCYTES # BLD AUTO: 0.5 K/UL (ref 0.3–1)
MONOCYTES NFR BLD: 6.4 % (ref 4–15)
NEUTROPHILS # BLD AUTO: 6.7 K/UL (ref 1.8–7.7)
NEUTROPHILS NFR BLD: 80.4 % (ref 38–73)
NRBC BLD-RTO: 0 /100 WBC
PLATELET # BLD AUTO: 102 K/UL (ref 150–350)
PMV BLD AUTO: 12.8 FL (ref 9.2–12.9)
RBC # BLD AUTO: 2.33 M/UL (ref 4–5.4)
WBC # BLD AUTO: 8.31 K/UL (ref 3.9–12.7)

## 2020-07-11 PROCEDURE — 25000003 PHARM REV CODE 250: Performed by: STUDENT IN AN ORGANIZED HEALTH CARE EDUCATION/TRAINING PROGRAM

## 2020-07-11 PROCEDURE — 63600175 PHARM REV CODE 636 W HCPCS: Performed by: STUDENT IN AN ORGANIZED HEALTH CARE EDUCATION/TRAINING PROGRAM

## 2020-07-11 PROCEDURE — 36415 COLL VENOUS BLD VENIPUNCTURE: CPT

## 2020-07-11 PROCEDURE — 99231 PR SUBSEQUENT HOSPITAL CARE,LEVL I: ICD-10-PCS | Mod: ,,, | Performed by: OBSTETRICS & GYNECOLOGY

## 2020-07-11 PROCEDURE — 99231 SBSQ HOSP IP/OBS SF/LOW 25: CPT | Mod: ,,, | Performed by: OBSTETRICS & GYNECOLOGY

## 2020-07-11 PROCEDURE — 99024 POSTOP FOLLOW-UP VISIT: CPT | Mod: ,,, | Performed by: OBSTETRICS & GYNECOLOGY

## 2020-07-11 PROCEDURE — 85025 COMPLETE CBC W/AUTO DIFF WBC: CPT

## 2020-07-11 PROCEDURE — 11000001 HC ACUTE MED/SURG PRIVATE ROOM

## 2020-07-11 PROCEDURE — 51702 INSERT TEMP BLADDER CATH: CPT

## 2020-07-11 PROCEDURE — 99024 PR POST-OP FOLLOW-UP VISIT: ICD-10-PCS | Mod: ,,, | Performed by: OBSTETRICS & GYNECOLOGY

## 2020-07-11 RX ORDER — HYDROMORPHONE HYDROCHLORIDE 1 MG/ML
0.2 INJECTION, SOLUTION INTRAMUSCULAR; INTRAVENOUS; SUBCUTANEOUS ONCE
Status: COMPLETED | OUTPATIENT
Start: 2020-07-11 | End: 2020-07-11

## 2020-07-11 RX ADMIN — ONDANSETRON 4 MG: 2 INJECTION INTRAMUSCULAR; INTRAVENOUS at 02:07

## 2020-07-11 RX ADMIN — IBUPROFEN 800 MG: 400 TABLET, FILM COATED ORAL at 11:07

## 2020-07-11 RX ADMIN — DOCUSATE SODIUM 200 MG: 100 CAPSULE, LIQUID FILLED ORAL at 09:07

## 2020-07-11 RX ADMIN — ACETAMINOPHEN 650 MG: 325 TABLET ORAL at 03:07

## 2020-07-11 RX ADMIN — DOCUSATE SODIUM 200 MG: 100 CAPSULE, LIQUID FILLED ORAL at 08:07

## 2020-07-11 RX ADMIN — ACETAMINOPHEN 650 MG: 325 TABLET ORAL at 09:07

## 2020-07-11 RX ADMIN — SIMETHICONE 80 MG: 80 TABLET, CHEWABLE ORAL at 07:07

## 2020-07-11 RX ADMIN — OXYCODONE 10 MG: 5 TABLET ORAL at 09:07

## 2020-07-11 RX ADMIN — KETOROLAC TROMETHAMINE 30 MG: 30 INJECTION, SOLUTION INTRAMUSCULAR at 09:07

## 2020-07-11 RX ADMIN — SIMETHICONE 80 MG: 80 TABLET, CHEWABLE ORAL at 04:07

## 2020-07-11 RX ADMIN — HYDROMORPHONE HYDROCHLORIDE 0.2 MG: 1 INJECTION, SOLUTION INTRAMUSCULAR; INTRAVENOUS; SUBCUTANEOUS at 11:07

## 2020-07-11 RX ADMIN — DIPHENHYDRAMINE HYDROCHLORIDE 12.5 MG: 50 INJECTION, SOLUTION INTRAMUSCULAR; INTRAVENOUS at 03:07

## 2020-07-11 RX ADMIN — PRENATAL VIT W/ FE FUMARATE-FA TAB 27-0.8 MG 1 TABLET: 27-0.8 TAB at 09:07

## 2020-07-11 RX ADMIN — IBUPROFEN 800 MG: 400 TABLET, FILM COATED ORAL at 03:07

## 2020-07-11 RX ADMIN — ENOXAPARIN SODIUM 60 MG: 100 INJECTION SUBCUTANEOUS at 01:07

## 2020-07-11 RX ADMIN — ACETAMINOPHEN 650 MG: 325 TABLET ORAL at 02:07

## 2020-07-11 RX ADMIN — KETOROLAC TROMETHAMINE 30 MG: 30 INJECTION, SOLUTION INTRAMUSCULAR at 02:07

## 2020-07-11 RX ADMIN — OXYCODONE 10 MG: 5 TABLET ORAL at 05:07

## 2020-07-11 NOTE — DISCHARGE INSTRUCTIONS
Breastfeeding Discharge Instructions       Feed the baby at the earliest sign of hunger or comfort  o Hands to mouth, sucking motions  o Rooting or searching for something to suck on  o Dont wait for crying - it is a sign of distress     The feedings may be 8-12 times per 24hrs and will not follow a schedule   Avoid pacifiers and bottles for the first 4 weeks   Alternate the breast you start the feeding with, or start with the breast that feels the fullest   Switch breasts when the baby takes himself off the breast or falls asleep   Keep offering breasts until the baby looks full, no longer gives hunger signs, and stays asleep when placed on his back in the crib   If the baby is sleepy and wont wake for a feeding, put the baby skin-to-skin dressed in a diaper against the mothers bare chest   Sleep near your baby   The baby should be positioned and latched on to the breast correctly  o Chest-to-chest, chin in the breast  o Babys lips are flipped outward  o Babys mouth is stretched open wide like a shout  o Babys sucking should feel like tugging to the mother  - The baby should be drinking at the breast:  o You should hear swallowing or gulping throughout the feeding  o You should see milk on the babys lips when he comes off the breast  o Your breasts should be softer when the baby is finished feeding  o The baby should look relaxed at the end of feedings  o After the 4th day and your milk is in:  o The babys poop should turn bright yellow and be loose, watery, and seedy  o The baby should have at least 3-4 poops the size of the palm of your hand per day  o The baby should have at least 5-6 wet diapers per day  o The urine should be light yellow in color  You should drink when you are thirsty and eat a healthy diet when you are    hungry.     Take naps to get the rest you need.   Take medications and/or drink alcohol only with permission of your obstetrician    or the babys pediatrician.  You can  also call the [facility] Risk [facility],   ([phone]), Monday-Friday, 8am-5pm Central time, to get the most   up-to-date evidence-based information on the use of medications during   pregnancy and breastfeeding.      The baby should be examined by a pediatrician at 3-5 days of age.  Once your   milk comes in, the baby should be gaining at least ½ - 1oz each day and should be back to birthweight no later than 10-14 days of age.          Community Resources    [facility] Breastfeeding Warmline: [phone]   Local [facility] clinics: provide incentives and breastpumps to eligible mothers  La Leche [employer] (LLLI):  mother-to-mother support group website        www.[employer].markedup  Local La Leche League mother-to-mother support groups:        www.Casualing        La Leche League [facility]   Dr. Jona Page website for latch videos and general information:        www.breastfeedinginc.ca  [facility] is a call center that provides information about the safety of taking medications while breastfeeding.  Call 1-936.327.7327, M-F, 8am-5pm, [place].  International Lactation Consultant Association provides resources for assistance:        www.ilca.org  [place] Breastfeeding Coalition provides informationand resources for parents  and the community    www.LaBreastfeedingSupport.org     Reyna Mendoza is a mom-to-mom support group:                             www.[employer].com.com//breastfeedng-support/  [employer] for Healthy Babies:  1-626-331-BABY(0032)  [employer]: a breastfeeding support group for women of color, 885.530.8235

## 2020-07-11 NOTE — PLAN OF CARE
VSS. Pt was experiencing pain levels of 10/10 and vomited 900 mL around 1900 last night. UO was barely adequate and a dark nikolay color- a bag of LR was started at this time. Output and nausea improved significantly over the next few hours. Mcghee removed at 0230 this morning. Ambulating and voiding without difficulty.Per patient, the oxycodone is what has been making her nauseous. Since this discussion, I have only brought schedule tylenol and toradol, which has successfully been controlling her pain so far. Patient has requested antinausea meds to accompany any pain meds to hopefully prevent any more bouts of N/V. Mother is bonding with infant well. No other complaints at this time. Will continue to monitor.

## 2020-07-11 NOTE — LACTATION NOTE
07/11/20 1150   Maternal Infant Feeding   Latch Assistance other (see comments)  (encouraged to call for assessment)   Basic lactation education reviewed. Encouraged to call for latch assessment. LC number on board.

## 2020-07-11 NOTE — PROGRESS NOTES
POSTPARTUM PROGRESS NOTE     Murali CHOI Emelina is a 24 y.o. female POD #1 status post Repeat  section at 38w2d in a pregnancy complicated by sickle cell disease and h/o stroke (requiring left eye prosthesis due to retinal detachment).   Patient is doing well this morning. She denies nausea, vomiting, fever or chills.  Patient reports moderate abdominal pain that is well relieved by oral pain medications. Lochia is mild. Patient is voiding without difficulty via marley and ambulating with no difficulty. She has passed flatus, and has not had BM.  Patient does plan to breast feed. Patient desires nexplanon for contraception. She desires circumcision.     Objective:       Temp:  [96.6 °F (35.9 °C)-98.6 °F (37 °C)] 98.6 °F (37 °C)  Pulse:  [61-85] 70  Resp:  [14-20] 20  SpO2:  [97 %-100 %] 97 %  BP: (100-128)/(55-79) 128/63    General:   alert, appears stated age and cooperative   Lungs:   clear to auscultation bilaterally   Heart:   regular rate and rhythm, S1, S2 normal, no murmur, click, rub or gallop   Abdomen:  soft, non-tender; bowel sounds normal; no masses,  no organomegaly   Uterus:  firm located at the umblicus.        Incision: Bandage in place, clean, dry and intact   Extremities: peripheral pulses normal, no pedal edema, no clubbing or cyanosis     Lab Review  No results found for this or any previous visit (from the past 4 hour(s)).    I/O    Intake/Output Summary (Last 24 hours) at 7/10/2020 2220  Last data filed at 7/10/2020 2000  Gross per 24 hour   Intake 1839.58 ml   Output 1855 ml   Net -15.42 ml        Assessment:     Patient Active Problem List   Diagnosis    Hemoglobin S-C disease    Thrombocytopenia    Anemia due to chronic illness    Supervision of normal subsequent pregnancy    Sickle cell pain crisis    TIA (transient ischemic attack)    Avascular necrosis of femur head, left    History of  delivery    Coats' disease of left eye    Dyspnea on exertion     Maternal sickle cell anemia complicating pregnancy in second trimester    34 weeks gestation of pregnancy    Indication for care or intervention related to labor and delivery        Plan:   1. Postpartum care:  - Patient doing well. Continue routine management and advances.  - Continue PO pain meds. Pain well controlled.  - Heme: H/h 9/27 > 7/20  - Encourage ambulation  - Circumcision desires and consented this AM  - Contraception-- desires nexplanon  - Lactation consultation PRN  - Rh status positive     2. H/o ocular stroke   - retinal detachment occurred at that time requiring ocular prosthesis   - patient was on lovenox 60 daily in the antepartum period   - discussed with MFM and decision made to continue this daily regimen for six weeks post partum   - ordered to restart today     3. Sickle cell disease   - follows with heme/onc -- Dr. Shankar   - last visit in January   - recommend follow up in post partum period     4. Anemia   - 7/20 post op   - patient asymptomatic   - continue to monitor and transfuse if symptomatic       Dispo: As patient meets milestones, will plan to discharge as appropriate.    Norma Jerome MD  OBGYN, PGY-3

## 2020-07-12 VITALS
WEIGHT: 172 LBS | OXYGEN SATURATION: 98 % | HEIGHT: 65 IN | RESPIRATION RATE: 16 BRPM | DIASTOLIC BLOOD PRESSURE: 61 MMHG | TEMPERATURE: 98 F | HEART RATE: 70 BPM | SYSTOLIC BLOOD PRESSURE: 112 MMHG | BODY MASS INDEX: 28.66 KG/M2

## 2020-07-12 PROCEDURE — 99024 POSTOP FOLLOW-UP VISIT: CPT | Mod: ,,, | Performed by: OBSTETRICS & GYNECOLOGY

## 2020-07-12 PROCEDURE — 25000003 PHARM REV CODE 250: Performed by: STUDENT IN AN ORGANIZED HEALTH CARE EDUCATION/TRAINING PROGRAM

## 2020-07-12 PROCEDURE — 63600175 PHARM REV CODE 636 W HCPCS: Performed by: STUDENT IN AN ORGANIZED HEALTH CARE EDUCATION/TRAINING PROGRAM

## 2020-07-12 PROCEDURE — 99238 HOSP IP/OBS DSCHRG MGMT 30/<: CPT | Mod: ,,, | Performed by: OBSTETRICS & GYNECOLOGY

## 2020-07-12 PROCEDURE — 99024 PR POST-OP FOLLOW-UP VISIT: ICD-10-PCS | Mod: ,,, | Performed by: OBSTETRICS & GYNECOLOGY

## 2020-07-12 PROCEDURE — 99238 PR HOSPITAL DISCHARGE DAY,<30 MIN: ICD-10-PCS | Mod: ,,, | Performed by: OBSTETRICS & GYNECOLOGY

## 2020-07-12 RX ORDER — OXYCODONE HYDROCHLORIDE 5 MG/1
5 TABLET ORAL EVERY 4 HOURS PRN
Qty: 25 TABLET | Refills: 0 | Status: SHIPPED | OUTPATIENT
Start: 2020-07-12 | End: 2021-03-01 | Stop reason: SDUPTHER

## 2020-07-12 RX ORDER — IBUPROFEN 800 MG/1
800 TABLET ORAL EVERY 8 HOURS
Qty: 60 TABLET | Refills: 2 | OUTPATIENT
Start: 2020-07-12 | End: 2023-06-23

## 2020-07-12 RX ORDER — DOCUSATE SODIUM 100 MG/1
200 CAPSULE, LIQUID FILLED ORAL 2 TIMES DAILY
Qty: 60 CAPSULE | Refills: 2 | Status: SHIPPED | OUTPATIENT
Start: 2020-07-12

## 2020-07-12 RX ADMIN — ACETAMINOPHEN 650 MG: 325 TABLET ORAL at 03:07

## 2020-07-12 RX ADMIN — ACETAMINOPHEN 650 MG: 325 TABLET ORAL at 09:07

## 2020-07-12 RX ADMIN — ENOXAPARIN SODIUM 60 MG: 100 INJECTION SUBCUTANEOUS at 12:07

## 2020-07-12 RX ADMIN — PRENATAL VIT W/ FE FUMARATE-FA TAB 27-0.8 MG 1 TABLET: 27-0.8 TAB at 09:07

## 2020-07-12 RX ADMIN — DOCUSATE SODIUM 200 MG: 100 CAPSULE, LIQUID FILLED ORAL at 09:07

## 2020-07-12 RX ADMIN — OXYCODONE 10 MG: 5 TABLET ORAL at 03:07

## 2020-07-12 RX ADMIN — IBUPROFEN 800 MG: 400 TABLET, FILM COATED ORAL at 09:07

## 2020-07-12 NOTE — PROGRESS NOTES
MD to bedside to evaluate 10/10 pain. Pt describes cramping pain over abdomen that has been poorly controlled with PO pain medication. Pt is nervous about precipitating a sickle cell pain crisis.    Temp:  [97.6 °F (36.4 °C)-98.7 °F (37.1 °C)] 98.3 °F (36.8 °C)  Pulse:  [66-72] 72  Resp:  [16-18] 16  SpO2:  [98 %-100 %] 98 %  BP: (100-121)/(50-58) 115/56    ABD:  incision clean, dry, intact, slight cramping tenderness upon palpation  Resp: nl WOB    Will give one time dose of dilaudid and continue to monitor.    Delmy Horowitz MD/MPH  OB/GYN PGY1

## 2020-07-12 NOTE — DISCHARGE SUMMARY
Delivery Discharge Summary  Obstetrics      Primary OB Clinician: Zoila Donaldson MD     Admission date: 7/10/2020  Discharge date: 2020    Disposition: To home, self care    Discharge Diagnosis List:  Patient Active Problem List   Diagnosis    Hemoglobin S-C disease    Thrombocytopenia    Anemia due to chronic illness    Supervision of normal subsequent pregnancy    Sickle cell pain crisis    TIA (transient ischemic attack)    Avascular necrosis of femur head, left    S/P repeat low transverse     Coats' disease of left eye    Dyspnea on exertion    Maternal sickle cell anemia complicating pregnancy in second trimester    34 weeks gestation of pregnancy    Indication for care or intervention related to labor and delivery       Procedure: , due to hx CS     Hospital Course:  Haydeesharimaricel Laureano Tarango is a 24 y.o. now , POD #2 who was admitted on 7/10/2020 at 38w2d for scheduled RLTCS. Patient was subsequently admitted to labor and delivery unit with signed consents.     Labor course was complicated by , and decision was made to proceed with delivery via  which was performed without complications.    Please see delivery note for further details. Her postpartum course was uncomplicated. On discharge day, patient's pain is controlled with oral pain medications. Pt is tolerating ambulation without SOB or CP, and regular diet without N/V. Reports lochia is mild. Denies any HA, vision changes, F/C, LE swelling. Denies any breast pain/soreness.    Pt in stable condition and ready for discharge. She has been instructed to start and/or continue medications and follow up with her obstetrics provider as listed below.    Pertinent studies:  CBC  Recent Labs   Lab 07/10/20  1205 07/10/20  1718 20  0501   WBC 7.84 10.54 8.31   HGB 9.2* 9.2* 7.2*   HCT 27.9* 27.2* 20.8*   MCV 90 89 89   * 113* 102*        Immunization History   Administered Date(s)  Administered    HiB PRP-T 2014    Influenza - Quadrivalent - PF (6 months and older) 10/21/2014    Meningococcal Conjugate (MCV4P) 2014    Pneumococcal Polysaccharide - 23 Valent 2014    Tdap 10/21/2014        Delivery:    Episiotomy: None   Lacerations: None   Repair suture: None   Repair # of packets:     Blood loss (ml):       Birth information:  YOB: 2022   Time of birth: 2:48 PM   Sex: male   Delivery type: , Low Transverse   Gestational Age: 38w2d    Delivery Clinician:      Other providers:       Additional  information:  Forceps:    Vacuum:    Breech:    Observed anomalies      Living?:           APGARS  One minute Five minutes Ten minutes   Skin color:         Heart rate:         Grimace:         Muscle tone:         Breathing:         Totals: 9  9        Placenta: Delivered:       appearance      Patient Instructions:   Current Discharge Medication List      START taking these medications    Details   docusate sodium (COLACE) 100 MG capsule Take 2 capsules (200 mg total) by mouth 2 (two) times daily.  Qty: 60 capsule, Refills: 2      ibuprofen (ADVIL,MOTRIN) 800 MG tablet Take 1 tablet (800 mg total) by mouth every 8 (eight) hours.  Qty: 60 tablet, Refills: 2         CONTINUE these medications which have CHANGED    Details   oxyCODONE (ROXICODONE) 5 MG immediate release tablet Take 1 tablet (5 mg total) by mouth every 4 (four) hours as needed.  Qty: 25 tablet, Refills: 0    Comments: Quantity prescribed more than 7 day supply? No         CONTINUE these medications which have NOT CHANGED    Details   enoxaparin (LOVENOX) 60 mg/0.6 mL Syrg Inject 0.6 mLs (60 mg total) into the skin once daily.  Qty: 30 Syringe, Refills: 10    Associated Diagnoses: Thrombocytopenia; Anemia due to chronic illness; Sickle-cell-hemoglobin C disease with crisis; Dyspnea on exertion      ferrous sulfate (FEOSOL) 325 mg (65 mg iron) Tab tablet Take 1 tablet (325 mg total) by mouth once  daily.  Qty: 30 tablet, Refills: 0      promethazine (PHENERGAN) 6.25 mg/5 mL syrup Take 20 mLs (25 mg total) by mouth every 6 (six) hours as needed for Nausea.  Qty: 118 mL, Refills: 2         STOP taking these medications       aspirin (ECOTRIN) 81 MG EC tablet Comments:   Reason for Stopping:         folic acid (FOLVITE) 1 MG tablet Comments:   Reason for Stopping:         glutamine, bulk, (L-GLUTAMINE) Comments:   Reason for Stopping:         prenatal vit calc,iron,folic (PRENATAL VITAMIN ORAL) Comments:   Reason for Stopping:               Discharge Procedure Orders   BREAST PUMP FOR HOME USE     Order Specific Question Answer Comments   Type of pump: Hospital grade electric    Weight: 78 kg (172 lb)    Length of need (1-99 months): 99      Diet Adult Regular     Pelvic Rest   Order Comments: Nothing in vagina until seen in clinic     Lifting restrictions   Order Comments: No lifting anything larger than baby for 6 weeks     No driving until:   Order Comments: No driving while taking narcotics     Notify your health care provider if you experience any of the following:  temperature >100.4     Notify your health care provider if you experience any of the following:  persistent nausea and vomiting or diarrhea     Notify your health care provider if you experience any of the following:  severe uncontrolled pain     Notify your health care provider if you experience any of the following:  redness, tenderness, or signs of infection (pain, swelling, redness, odor or green/yellow discharge around incision site)     Notify your health care provider if you experience any of the following:  difficulty breathing or increased cough     Notify your health care provider if you experience any of the following:  severe persistent headache     Notify your health care provider if you experience any of the following:  worsening rash     Notify your health care provider if you experience any of the following:  persistent  dizziness, light-headedness, or visual disturbances     Notify your health care provider if you experience any of the following:   Order Comments: Heavy vaginal bleeding     Activity as tolerated       Follow-up Information     Zoila Donaldson MD In 6 weeks.    Specialty: Obstetrics and Gynecology  Why: Postpartum Follow-Up  Contact information:  [address] [facility] [id number] [phone]                    KIM Roth MD  OBGYN PGY2

## 2020-07-12 NOTE — ANESTHESIA POSTPROCEDURE EVALUATION
Anesthesia Post Evaluation    Patient: Murali Tarango    Procedure(s) Performed: Procedure(s) (LRB):   SECTION (N/A)    Final Anesthesia Type: CSE    Patient location during evaluation: floor  Patient participation: Yes- Able to Participate  Level of consciousness: awake and alert  Post-procedure vital signs: reviewed and stable  Pain management: adequate  Airway patency: patent    PONV status at discharge: No PONV  Anesthetic complications: no      Cardiovascular status: blood pressure returned to baseline  Respiratory status: unassisted and spontaneous ventilation  Hydration status: euvolemic  Follow-up not needed.          Vitals Value Taken Time   /61 20 0747   Temp 36.7 °C (98.1 °F) 20 0747   Pulse 70 20 0747   Resp 16 20 0747   SpO2 98 % 20 0747         No case tracking events are documented in the log.      Pain/Holly Score: Pain Rating Prior to Med Admin: 3 (2020  9:58 AM)  Pain Rating Post Med Admin: 2 (2020  4:56 AM)

## 2020-07-13 LAB
BLD PROD TYP BPU: NORMAL
BLD PROD TYP BPU: NORMAL
BLOOD UNIT EXPIRATION DATE: NORMAL
BLOOD UNIT EXPIRATION DATE: NORMAL
BLOOD UNIT TYPE CODE: 9500
BLOOD UNIT TYPE CODE: 9500
BLOOD UNIT TYPE: NORMAL
BLOOD UNIT TYPE: NORMAL
CODING SYSTEM: NORMAL
CODING SYSTEM: NORMAL
DISPENSE STATUS: NORMAL
DISPENSE STATUS: NORMAL
HIV 1+2 AB+HIV1 P24 AG SERPL QL IA: NEGATIVE
NUM UNITS TRANS PACKED RBC: NORMAL
NUM UNITS TRANS PACKED RBC: NORMAL

## 2021-03-05 ENCOUNTER — TELEPHONE (OUTPATIENT)
Dept: OPTOMETRY | Facility: CLINIC | Age: 26
End: 2021-03-05

## 2021-03-10 ENCOUNTER — PATIENT MESSAGE (OUTPATIENT)
Dept: OPTOMETRY | Facility: CLINIC | Age: 26
End: 2021-03-10

## 2021-03-25 DIAGNOSIS — D63.8 ANEMIA DUE TO CHRONIC ILLNESS: ICD-10-CM

## 2021-03-25 DIAGNOSIS — D69.6 THROMBOCYTOPENIA: Primary | ICD-10-CM

## 2021-03-26 ENCOUNTER — TELEPHONE (OUTPATIENT)
Dept: HEMATOLOGY/ONCOLOGY | Facility: CLINIC | Age: 26
End: 2021-03-26

## 2021-05-04 ENCOUNTER — PATIENT MESSAGE (OUTPATIENT)
Dept: RESEARCH | Facility: HOSPITAL | Age: 26
End: 2021-05-04

## 2021-05-24 PROBLEM — Z3A.34 34 WEEKS GESTATION OF PREGNANCY: Status: RESOLVED | Noted: 2020-06-10 | Resolved: 2021-05-24

## 2021-06-14 DIAGNOSIS — H40.013 OAG (OPEN ANGLE GLAUCOMA) SUSPECT, LOW RISK, BILATERAL: Primary | ICD-10-CM

## 2021-06-15 ENCOUNTER — TELEPHONE (OUTPATIENT)
Dept: OPTOMETRY | Facility: CLINIC | Age: 26
End: 2021-06-15

## 2021-06-18 ENCOUNTER — PATIENT MESSAGE (OUTPATIENT)
Dept: HEMATOLOGY/ONCOLOGY | Facility: CLINIC | Age: 26
End: 2021-06-18

## 2021-07-14 ENCOUNTER — OFFICE VISIT (OUTPATIENT)
Dept: OPTOMETRY | Facility: CLINIC | Age: 26
End: 2021-07-14
Payer: MEDICAID

## 2021-07-14 ENCOUNTER — CLINICAL SUPPORT (OUTPATIENT)
Dept: OPHTHALMOLOGY | Facility: CLINIC | Age: 26
End: 2021-07-14
Payer: MEDICAID

## 2021-07-14 DIAGNOSIS — D57.20 SICKLE CELL-HEMOGLOBIN C DISEASE WITHOUT CRISIS: ICD-10-CM

## 2021-07-14 DIAGNOSIS — Z86.73 HISTORY OF STROKE: ICD-10-CM

## 2021-07-14 DIAGNOSIS — G43.109 OCULAR MIGRAINE: ICD-10-CM

## 2021-07-14 DIAGNOSIS — Z97.0 PROSTHETIC EYE GLOBE: ICD-10-CM

## 2021-07-14 DIAGNOSIS — H43.393 VISUAL FLOATERS, BILATERAL: ICD-10-CM

## 2021-07-14 DIAGNOSIS — H54.40 BLINDNESS OF LEFT EYE WITH NORMAL VISION IN CONTRALATERAL EYE: ICD-10-CM

## 2021-07-14 DIAGNOSIS — Z46.0 FITTING AND ADJUSTMENT OF SPECTACLES AND CONTACT LENSES: ICD-10-CM

## 2021-07-14 DIAGNOSIS — H52.201 MYOPIA WITH ASTIGMATISM, RIGHT: ICD-10-CM

## 2021-07-14 DIAGNOSIS — Z97.3 WEARS CONTACT LENSES: Primary | ICD-10-CM

## 2021-07-14 DIAGNOSIS — H10.503 BLEPHAROCONJUNCTIVITIS OF BOTH EYES, UNSPECIFIED BLEPHAROCONJUNCTIVITIS TYPE: ICD-10-CM

## 2021-07-14 DIAGNOSIS — H52.11 MYOPIA WITH ASTIGMATISM, RIGHT: ICD-10-CM

## 2021-07-14 DIAGNOSIS — H35.022 COATS' DISEASE OF LEFT EYE: ICD-10-CM

## 2021-07-14 DIAGNOSIS — Z86.69 HISTORY OF RETINAL DETACHMENT: ICD-10-CM

## 2021-07-14 DIAGNOSIS — H40.013 OAG (OPEN ANGLE GLAUCOMA) SUSPECT, LOW RISK, BILATERAL: ICD-10-CM

## 2021-07-14 DIAGNOSIS — H40.013 OAG (OPEN ANGLE GLAUCOMA) SUSPECT, LOW RISK, BILATERAL: Primary | ICD-10-CM

## 2021-07-14 PROCEDURE — 99999 PR PBB SHADOW E&M-EST. PATIENT-LVL I: CPT | Mod: PBBFAC,,, | Performed by: OPTOMETRIST

## 2021-07-14 PROCEDURE — 99999 PR PBB SHADOW E&M-EST. PATIENT-LVL II: CPT | Mod: PBBFAC,,, | Performed by: OPTOMETRIST

## 2021-07-14 PROCEDURE — 99211 OFF/OP EST MAY X REQ PHY/QHP: CPT | Mod: PBBFAC,27,PO | Performed by: OPTOMETRIST

## 2021-07-14 PROCEDURE — 99999 PR PBB SHADOW E&M-EST. PATIENT-LVL I: ICD-10-PCS | Mod: PBBFAC,,, | Performed by: OPTOMETRIST

## 2021-07-14 PROCEDURE — 92015 PR REFRACTION: ICD-10-PCS | Mod: ,,, | Performed by: OPTOMETRIST

## 2021-07-14 PROCEDURE — 99212 OFFICE O/P EST SF 10 MIN: CPT | Mod: PBBFAC,PO | Performed by: OPTOMETRIST

## 2021-07-14 PROCEDURE — 92014 PR EYE EXAM, EST PATIENT,COMPREHESV: ICD-10-PCS | Mod: S$PBB,,, | Performed by: OPTOMETRIST

## 2021-07-14 PROCEDURE — 99999 PR PBB SHADOW E&M-EST. PATIENT-LVL II: ICD-10-PCS | Mod: PBBFAC,,, | Performed by: OPTOMETRIST

## 2021-07-14 PROCEDURE — 92310 CONTACT LENS FITTING OU: CPT | Mod: ,,, | Performed by: OPTOMETRIST

## 2021-07-14 PROCEDURE — 92015 DETERMINE REFRACTIVE STATE: CPT | Mod: ,,, | Performed by: OPTOMETRIST

## 2021-07-14 PROCEDURE — 92310 PR CONTACT LENS FITTING (NO CHANGE): ICD-10-PCS | Mod: ,,, | Performed by: OPTOMETRIST

## 2021-07-14 PROCEDURE — 92014 COMPRE OPH EXAM EST PT 1/>: CPT | Mod: S$PBB,,, | Performed by: OPTOMETRIST

## 2021-07-14 RX ORDER — HYDROCODONE BITARTRATE AND ACETAMINOPHEN 5; 325 MG/1; MG/1
1 TABLET ORAL EVERY 6 HOURS PRN
COMMUNITY
Start: 2021-07-01 | End: 2021-08-11

## 2021-07-14 RX ORDER — TOBRAMYCIN 3 MG/ML
1 SOLUTION/ DROPS OPHTHALMIC 4 TIMES DAILY
Qty: 5 ML | Refills: 6 | Status: SHIPPED | OUTPATIENT
Start: 2021-07-14 | End: 2021-07-14

## 2021-07-14 RX ORDER — TOBRAMYCIN 3 MG/ML
1 SOLUTION/ DROPS OPHTHALMIC 4 TIMES DAILY
Qty: 5 ML | Refills: 6 | Status: SHIPPED | OUTPATIENT
Start: 2021-07-14 | End: 2021-07-21

## 2021-07-14 RX ORDER — AMOXICILLIN 500 MG/1
500 CAPSULE ORAL 3 TIMES DAILY
COMMUNITY
Start: 2021-07-01 | End: 2023-06-23

## 2021-07-15 ENCOUNTER — PATIENT MESSAGE (OUTPATIENT)
Dept: OPTOMETRY | Facility: CLINIC | Age: 26
End: 2021-07-15

## 2021-07-15 PROBLEM — Z97.3 WEARS CONTACT LENSES: Status: ACTIVE | Noted: 2021-07-15

## 2021-07-27 ENCOUNTER — TELEPHONE (OUTPATIENT)
Dept: OPTOMETRY | Facility: CLINIC | Age: 26
End: 2021-07-27

## 2021-07-30 ENCOUNTER — TELEPHONE (OUTPATIENT)
Dept: OPTOMETRY | Facility: CLINIC | Age: 26
End: 2021-07-30

## 2021-08-04 ENCOUNTER — TELEPHONE (OUTPATIENT)
Dept: OPTOMETRY | Facility: CLINIC | Age: 26
End: 2021-08-04

## 2022-09-02 NOTE — Clinical Note
Subjective:      DATE OF VISIT: 9/2/22     ?  Patient ID:Jaison Noel is a 43 y.o. female.?? MR#: [id number]   ?   PRIMARY ONCOLOGIST: Dr. Hathaway    ? Primary Care Providers:  Alesha Alonso MD, MD (General)     PRIMARY ONCOLOGIST: Dr. Hathaway    CHIEF COMPLAINT:  Follow-up?   ONCOLOGIC DIAGNOSIS:  Stage IB, cT1c, cN0, cM0 left breast upper outer quadrant 2:00 a.m. invasive ductal carcinoma grade 3 ER/NV negative, HER2 negative  ?   CURRENT TREATMENT:  neoadjuvant chemotherapy, carboplatin paclitaxel pembrolizumab followed by doxorubicin cyclophosphamide pembrolizumab    PAST TREATMENT:  Biopsy only  ?   INTERVAL EVENTS:    Services for her son were held last Friday and she is coping as can be expected.  Sister companies her to her visit today.  Mild neuropathy in fingertips a couple days after infusion with resolution thereafter.  No new symptoms or concerns.    ONCOLOGIC HISTORY:     Ms. Noel is a 43-year-old woman with hypothyroidism and hypertension.  She presented for routine screening mammogram 11/30/2021 showing left breast mass upper outer posterior position.    12/03/2021 diagnostic mammogram and ultrasound  Left  Mammo Digital Diagnostic Bilat with [place]  There is a mass seen in the upper outer quadrant of the left breast in the posterior depth.      US Breast Bilateral Limited  There are 3 similar complicated cysts seen in the upper outer quadrant of the left breast in the posterior depth. The largest cyst is thought to be the correlate and measures 0.7 x 0.5 x 0.5 cm.  There are 2 additional complex cysts also seen in the upper outer quadrant of the left breast that measure 0.4 x 0.2 x 0.3 cm and 0.6 x 0.3 x 0.4 cm.      Right  Mammo Digital Diagnostic Bilat with [place]  There is a mass seen in the lower central region of the right breast in the posterior depth. The posterior margin of this lesion is not seen on mammography.  The lesion appears to abut and overlie the pectoralis musculature.  The  Twice Weekly iv fluids starting next week lesion appears to measure at least 9 mm in size.      US Breast Bilateral Limited  There is a mass seen in the lower central region of the right breast. A definitive correlate is not definitely seen on ultrasound.  There is a 6 mm hypoechoic lesion likely representing a complex cyst within the central portion of the breast that appears to be too small to be the correlate for the mammographic finding.  There is an additional 1.0 x 0.4 x 0.6 cm hypoechoic area that is located within a ridge of tissue and also does not definitively correlate to the lesion.  It is possible this lesion was present on prior studies although only space visualized on the most recent study due to increased tissue included on the MLO view.  The visualized margins of the lesion also appear to be fairly smooth and therefore the lesion can be followed.      Impression:  Left  Mass: Left breast mass at the upper outer posterior position. Assessment: 3 - Probably benign. Short Interval Follow-Up in 6 Months is recommended.      Right  Mass: Right breast mass at the lower central posterior position. Assessment: 3 - Probably benign. Short Interval Follow-Up in 6 Months is recommended.      BI-RADS Category:   Overall: 3 - Probably Benign    Repeat evaluation with diagnostic mammogram and ultrasound on 05/24/2022 notable for a suspicious left breast lesion 1.3 cm 2:00 a.m.    Right breast: No detrimental mammographic change. Well-circumscribed lesion within the lower outer posterior breast is unchanged mammographically. 8.6 mm complicated cyst present at 08:00 o'clock, 12 cm from the nipple and corresponds to the stable mammographic finding.       Follow-up imaging of the previously noted central lesions demonstrate no detrimental change.  8 mm complicated cyst noted at 04:00 o'clock, 4 cm from the nipple.         Left breast: Interval increase in size of an upper outer quadrant mass on mammogram. On ultrasound there is an enlarging complicated  cystic lesion measuring 1.3 cm at 02:00 o'clock, 7 cm from the nipple. Other complicated cysts within the upper outer breast demonstrate no detrimental change.  No suspicious left axillary lymph nodes.    Ultrasound-guided biopsy left breast lesion on 2022  Pathology:  Final Pathologic Diagnosis 1. Left breast mass (2 o'clock position), biopsy:       -  High-grade invasive carcinoma of no special type (ductal) with   associated tumor necrosis,          see comment     SYNOPTIC REPORT   PROCEDURE:  Biopsy   SPECIMEN LATERALITY:  Left breast   TUMOR SITE:  2 o'clock position   HISTOLOGIC TYPE:  Invasive carcinoma of no special type (ductal)   HISTOLOGIC GRADE:  Grade 3 of 3          TUBULE FORMATION:  3          PLEOMORPHISM:  3          MITOTIC RATE:  3   TUMOR SIZE:   At least 10 mm in greatest linear dimension   DUCTAL CARCINOMA IN SITU (DCIS):  Not identified   LYMPHOVASCULAR INVASION:  Not identified   MICROCALCIFICATIONS:  Not identified   BREAST BIOMARKERS (PERFORMED WITH APPROPRIATE CONTROLS):           ER:   Negative (0)           OK:   Negative (0)           HER2:  Negative (1+)           KI67:  Greater than 95%       2022 PET-CT without evidence of distant metastatic disease.     FDG avid left breast lesion consistent with the known malignancy.  No other sites of suspicious uptake identified.    Menarche 9 years old  ; 1 miscarriage, age of 1st pregnancy 13 years old  Postmenopausal last menstrual period at 41 years old s/p PIERRE/BSO  Exogenous estrogen for about 1 year last when 42yo.  No history of prior radiation  No prior breast biopsy    Family history notable for mother with either breast or ovarian cancer diagnosis 64 years old.    Cycle 1 day 1 neoadjuvant chemotherapy 07/15/2022    Interval events:  Completed thrombectomy improvement in edema still mild asymmetry right hand/arm greater than left exacerbated with activity.  Some nausea using Zofran, olanzapine and dexamethasone  unclear if she is using Compazine.    Review of Systems    ?   A comprehensive 14-point review of systems was reviewed with patient and was negative other than as specified above.   ?     Objective:      Physical Exam      ?   Vitals:    09/02/22 0822   BP: 120/82   Pulse: 109   Temp: 97.2 °F (36.2 °C)      ?   ECOG:?0   General appearance: Generally well appearing, in no acute distress.   Head, eyes, ears, nose, and throat: moist mucous membranes.   Respiratory:  Normal work of breathing  Abdomen: nontender, nondistended.   Extremities: Warm, with mild asymmetry right upper extremity as compared to left.  Neurologic: Alert and oriented. Grossly normal strength, coordination, and gait.   Skin: No rashes, ecchymoses or petechial lesion.   Psychiatric:  Normal mood and affect.      Labs    Lab Results   Component Value Date    WBC 14.06 (H) 09/02/2022    HGB 11.7 (L) 09/02/2022    HCT 35.6 (L) 09/02/2022    MCV 83 09/02/2022     09/02/2022         Chemistry        Component Value Date/Time     09/02/2022 0815    K 4.2 09/02/2022 0815     09/02/2022 0815    CO2 23 09/02/2022 0815    BUN 17 09/02/2022 0815    CREATININE 0.8 09/02/2022 0815     (H) 09/02/2022 0815        Component Value Date/Time    CALCIUM 9.4 09/02/2022 0815    ALKPHOS 98 09/02/2022 0815    AST 88 (H) 09/02/2022 0815     (H) 09/02/2022 0815    BILITOT 0.2 09/02/2022 0815    ESTGFRAFRICA >60 07/29/2022 1006    EGFRNONAA >60 07/29/2022 1006        Lab Results   Component Value Date    TSH 0.469 09/02/2022       Imaging:  ?    No results found for this or any previous visit (from the past 2160 hour(s)).  No results found for this or any previous visit (from the past 2160 hour(s)).  Results for orders placed or performed during the hospital encounter of 06/20/22 (from the past 2160 hour(s))   NM PET CT Routine Skull to Mid Thigh    Impression    FDG avid left breast lesion consistent with the known malignancy.  No other  "sites of suspicious uptake identified.      Electronically signed by: Zay Dickerson DO  Date:    06/20/2022  Time:    13:58         Pathology:    No visits with results within 1 Day(s) from this visit.   Latest known visit with results is:   Hospital Outpatient Visit on 06/09/2022   Component Date Value Ref Range Status    Final Pathologic Diagnosis 06/09/2022    Final                    Value:1. Left breast mass (2 o'clock position), biopsy:      -  High-grade invasive carcinoma of no special type (ductal) with  associated tumor necrosis,         see comment    SYNOPTIC REPORT  PROCEDURE:  Biopsy  SPECIMEN LATERALITY:  Left breast  TUMOR SITE:  2 o'clock position  HISTOLOGIC TYPE:  Invasive carcinoma of no special type (ductal)  HISTOLOGIC GRADE:  Grade 3 of 3         TUBULE FORMATION:  3         PLEOMORPHISM:  3         MITOTIC RATE:  3  TUMOR SIZE:   At least 10 mm in greatest linear dimension  DUCTAL CARCINOMA IN SITU (DCIS):  Not identified  LYMPHOVASCULAR INVASION:  Not identified  MICROCALCIFICATIONS:  Not identified  BREAST BIOMARKERS (PERFORMED WITH APPROPRIATE CONTROLS):          ER:   Negative (0)          MD:   Negative (0)          HER2:  Negative (1+)          KI67:  Greater than 95%      Gross 06/09/2022    Final                    Value:Patient ID/Pathology ID [id number]  In formalin, labeled "left breast 02:00 o'clock, in formalin at 08:40", is a  2.5 x 2.5 cm aggregate of pink-yellow needle biopsy cores.  Entirely  submitted in cassette [id number]  Ischemic time is not provided.  The fixation time is greater than 6 hrs and  less than 72 hrs.  A gross picture of the container and cassette is taken and  added to file.  Charly HOLGUIN (ASCP) cm      Comment 06/09/2022    Final                    Value:Immunohistochemical stain with appropriate control for p63 was performed and  shows loss of myoepithelial cells throughout the lesion, with no evidence of  ductal carcinoma in situ.  The patient's " history of ovarian carcinoma is  noted.  Immunohistochemical stains for GATA3 and PAX8 appropriate controls  show tumor cell positivity for GATA3 and negativity for [place] 8, confirming  ductal (breast) origin.  This case seen in consultation with Dr. Montesinos who agrees with the above  diagnosis.      Disclaimer 06/09/2022    Final                    Value:Unless the case is a 'gross only' or additional testing only, the final  diagnosis for each specimen is based on a microscopic examination of  appropriate tissue sections.  ER immunohistochemical staining (clone SP1, DAB detection method) is  performed on formalin-fixed, paraffin embedded tissue sections. The  percentage of cell nuclei stained and the strength of staining is reported  (weak, moderate, strong), using the 2010 ACSO/CAP scoring guidelines. Tumors  used for determing predictive markers are fixed in10% neutral buffered  formalin for 6-72 hours. It has been cleared by the [place] FDA for use as an  IVD test. This assay has not been validated on decalcified tissues. Results  should be interpreted with caution given the likelihood of false negativity  on decalcified specimens.  HER-2/aston IHC (4B5) clone, DAB detection method) is done on 10% buffered  formalin-fixed (for 6-72 hrs), paraffin embedded tissue sections. The scoring  is completed on a 4-tiered scoring system of membran                          e staining using the 2014  ASCO/CAP scoring guidelines. It has been cleared by the [place] FDA for use as  an IVD test. This assay has not been validated on decalcified tissues.  Results should be interpreted with caution given the likelihood of false  negativity on decalcified specimens.  PgR immunohistochemical staining (clone 1E2, DAB detection method) is  performed on formalin-fixed, paraffin embedded tissue sections. The  percentage of cell nuclei stained and the strength of staining is report  (weak, moderate, strong), using 2010 ASCO/CAP scoring guidelines.  Tumors used  for determing predictive markers are fixed in 10% neutral buffered formalin  for 6-72 hours. It has been cleared by the [place] FDA for use as an IVD test.  This assay has not been validated on decalcified tissues. Results should be  interpreted with caution given the likelihood of false negativity on  decalcified specimens.            ?   Assessment/Plan:   Malignant neoplasm of upper-outer quadrant of left breast in female, estrogen receptor negative  -     Cancel: dexamethasone (DECADRON) 10 mg in sodium chloride 0.9% 50 mL IVPB  -     Cancel: diphenhydrAMINE (BENADRYL) 50 mg in sodium chloride 0.9% 50 mL IVPB  -     Cancel: famotidine (PF) injection 20 mg  -     Cancel: PACLitaxeL (TAXOL) 80 mg/m2 = 150 mg in sodium chloride 0.9% 250 mL chemo infusion  -     Cancel: sodium chloride 0.9% 250 mL flush bag  -     Cancel: sodium chloride 0.9% flush 10 mL  -     Cancel: heparin, porcine (PF) 100 unit/mL injection flush 500 Units    Acute deep vein thrombosis (DVT) of right upper extremity, unspecified vein    Transaminitis    Nausea    Other orders  -     EPINEPHrine (EPIPEN) 0.3 mg/0.3 mL pen injection 0.3 mg  -     diphenhydrAMINE injection 50 mg  -     hydrocortisone sodium succinate injection 100 mg  -     alteplase injection 2 mg       1. Malignant neoplasm of upper-outer quadrant of left breast in female, estrogen receptor negative    2. Acute deep vein thrombosis (DVT) of right upper extremity, unspecified vein    3. Transaminitis    4. Nausea            Plan:     Problem List Items Addressed This Visit          Hematology    Acute deep vein thrombosis (DVT) of right upper extremity       Oncology    Malignant neoplasm of upper-outer quadrant of left breast in female, estrogen receptor negative - Primary     Other Visit Diagnoses       Transaminitis        Nausea              Stage IB, cT1c cN0 cM0 left breast upper outer quadrant 2:00 a.m. invasive ductal carcinoma grade 3 ER/DC negative, HER2  negative:  - [facility] germline genetic testing negative   Clinical exam and mammogram/ultrasound without axillary adenopathy.  PET scan without evidence of distant metastatic disease.   We discussed that while early stage and T1c, higher risk features given triple negative and high grade 3 and high ki-67 >95% with noted growth over just 6 month interval recommendation for neoadjuavant approach with chemo immunotherapy per Guadalupe et al. NEJ 2020. She is healthy aside from prior hypothyroidism now not on supplement.  We did discuss risks and benefits of therapy and importance of re-evaluation with plan for surgery, radiation and further adjuvant therapy as indicated.  She expressed good understanding and agree with the plan for follow-up per below.    Contralateral breast biopsy benign    Recommend repeat physical exam and consider imaging evaluation following carboplatin paclitaxel and pembrolizumab portion of neoadjuvant therapy.      Prior transaminitis resolved now recurrent mild increase.  Okay to proceed with treatment today but will need to monitor closely with repeat labs next week.      Nausea:  Integrate Compazine p.r.n. addition to Zofran and standing after treatment olanzapine at night and dexamethasone in morning.      Thrombosis:  Status post thrombectomy port removal on anticoagulation to be restarted after procedure per IR instructions.  Will discuss with IR new central line placement which will be needed with her chemotherapy.    Message sent     Follow-Up:   C2D15 today  RV in 1 wk cbc, cmp D1 [id number], of to move later by 1-2 days pt would like to ultimately get back to Friday infusions; do not make earlier than 1 wk after last infusion.

## 2023-04-06 NOTE — TELEPHONE ENCOUNTER
"Subjective   Patient ID: Srinivas German is a 27 y.o. female who presents for Establish Care (New patient here to establish care).    HPI   C/O right knee pain, 2 months duration.  Had similar symptoms in the past, PT and medications resolved the pain.  Review of Systems  Right knee pain  Objective   Pulse 90   Ht 1.6 m (5' 3\")   Wt 110 kg (242 lb)   LMP 03/26/2023 (Exact Date)   SpO2 97%   BMI 42.87 kg/m²     Physical Exam  NAD. Cooperative.  HEENT: WNLLungs CTA  Heart: RRR  Musculoskeletal system: no effusion on knee exam B/L. ROM WNL, antalgic gait  Assessment/Plan   Diagnoses and all orders for this visit:  Morbid obesity with body mass index (BMI) of 40.0 or higher (CMS/[facility])  Oral contraception initiation  -     norethindrone-e.estradioL-iron (Microgestin FE 1.5/30) 1.5 mg-30 mcg (21)/75 mg (7) tablet; Take 1 tablet by mouth once daily.  Patellar bursitis of right knee  -     meloxicam (Mobic) 7.5 mg tablet; Take 1-2 tablets (7.5-15 mg) by mouth once daily as needed for moderate pain (4 - 6).  -     Referral to Physical Therapy; Future    Discussed weight goals, recommended gradual weight loss by daily caloric reduction and low to moderate intensity exercise as tolerated, optimum BMI is 27.  Discussed health benefits from whole food plant based diet. Bibliotherapy: The [place] Study, KOBE Lazcano, PhD, suggested        " Please see correspondence to GYN team and in communication gained the assistance from the GYN team with resolving this issue and miscommunications with patient

## 2023-06-22 ENCOUNTER — HOSPITAL ENCOUNTER (EMERGENCY)
Facility: OTHER | Age: 28
Discharge: HOME | End: 2023-06-23
Attending: EMERGENCY MEDICINE
Payer: MEDICAID

## 2023-06-22 DIAGNOSIS — S32.009A CLOSED FRACTURE OF TRANSVERSE PROCESS OF LUMBAR VERTEBRA, INITIAL ENCOUNTER: Primary | ICD-10-CM

## 2023-06-22 LAB
B-HCG UR QL: NEGATIVE
CTP QC/QA: YES

## 2023-06-22 PROCEDURE — 63600175 PHARM REV CODE 636 W HCPCS: Performed by: EMERGENCY MEDICINE

## 2023-06-22 PROCEDURE — 99285 EMERGENCY DEPT VISIT HI MDM: CPT | Mod: 25

## 2023-06-22 PROCEDURE — 96372 THER/PROPH/DIAG INJ SC/IM: CPT | Mod: 59 | Performed by: EMERGENCY MEDICINE

## 2023-06-22 PROCEDURE — 81025 URINE PREGNANCY TEST: CPT | Performed by: EMERGENCY MEDICINE

## 2023-06-22 PROCEDURE — 25000003 PHARM REV CODE 250: Performed by: EMERGENCY MEDICINE

## 2023-06-22 RX ORDER — KETOROLAC TROMETHAMINE 30 MG/ML
10 INJECTION, SOLUTION INTRAMUSCULAR; INTRAVENOUS
Status: COMPLETED | OUTPATIENT
Start: 2023-06-22 | End: 2023-06-23

## 2023-06-22 RX ORDER — METHOCARBAMOL 750 MG/1
750 TABLET, FILM COATED ORAL
Status: COMPLETED | OUTPATIENT
Start: 2023-06-22 | End: 2023-06-23

## 2023-06-22 RX ORDER — ORPHENADRINE CITRATE 30 MG/ML
60 INJECTION INTRAMUSCULAR; INTRAVENOUS
Status: COMPLETED | OUTPATIENT
Start: 2023-06-22 | End: 2023-06-22

## 2023-06-22 RX ORDER — HYDROMORPHONE HYDROCHLORIDE 1 MG/ML
1 INJECTION, SOLUTION INTRAMUSCULAR; INTRAVENOUS; SUBCUTANEOUS
Status: COMPLETED | OUTPATIENT
Start: 2023-06-22 | End: 2023-06-22

## 2023-06-22 RX ORDER — HYDROCODONE BITARTRATE AND ACETAMINOPHEN 10; 325 MG/1; MG/1
1 TABLET ORAL
Status: COMPLETED | OUTPATIENT
Start: 2023-06-22 | End: 2023-06-22

## 2023-06-22 RX ORDER — NAPROXEN 500 MG/1
500 TABLET ORAL
Status: DISCONTINUED | OUTPATIENT
Start: 2023-06-22 | End: 2023-06-22

## 2023-06-22 RX ORDER — LIDOCAINE 50 MG/G
1 PATCH TOPICAL
Status: DISCONTINUED | OUTPATIENT
Start: 2023-06-22 | End: 2023-06-23 | Stop reason: HOSPADM

## 2023-06-22 RX ADMIN — LIDOCAINE 1 PATCH: 50 PATCH CUTANEOUS at 08:06

## 2023-06-22 RX ADMIN — ORPHENADRINE CITRATE 60 MG: 30 INJECTION INTRAMUSCULAR; INTRAVENOUS at 08:06

## 2023-06-22 RX ADMIN — HYDROCODONE BITARTRATE AND ACETAMINOPHEN 1 TABLET: 10; 325 TABLET ORAL at 08:06

## 2023-06-22 RX ADMIN — HYDROMORPHONE HYDROCHLORIDE 1 MG: 1 INJECTION, SOLUTION INTRAMUSCULAR; INTRAVENOUS; SUBCUTANEOUS at 08:06

## 2023-06-22 RX ADMIN — IOHEXOL 75 ML: 350 INJECTION, SOLUTION INTRAVENOUS at 11:06

## 2023-06-22 NOTE — Clinical Note
"Murali August" Emelina was seen and treated in our emergency department on 6/22/2023.  She may return to work on 06/30/2023.       If you have any questions or concerns, please don't hesitate to call.      Lance Andrade LPN    "

## 2023-06-23 ENCOUNTER — TELEPHONE (OUTPATIENT)
Dept: ORTHOPEDICS | Facility: CLINIC | Age: 28
End: 2023-06-23
Payer: MEDICAID

## 2023-06-23 VITALS
TEMPERATURE: 98 F | OXYGEN SATURATION: 97 % | BODY MASS INDEX: 24.95 KG/M2 | HEART RATE: 98 BPM | RESPIRATION RATE: 18 BRPM | SYSTOLIC BLOOD PRESSURE: 154 MMHG | DIASTOLIC BLOOD PRESSURE: 62 MMHG | WEIGHT: 149.94 LBS

## 2023-06-23 PROCEDURE — 63600175 PHARM REV CODE 636 W HCPCS: Performed by: EMERGENCY MEDICINE

## 2023-06-23 PROCEDURE — 25000003 PHARM REV CODE 250: Performed by: EMERGENCY MEDICINE

## 2023-06-23 PROCEDURE — 96375 TX/PRO/DX INJ NEW DRUG ADDON: CPT

## 2023-06-23 PROCEDURE — 25500020 PHARM REV CODE 255: Performed by: EMERGENCY MEDICINE

## 2023-06-23 PROCEDURE — 96374 THER/PROPH/DIAG INJ IV PUSH: CPT

## 2023-06-23 RX ORDER — OXYCODONE HYDROCHLORIDE 5 MG/1
5 TABLET ORAL
Status: COMPLETED | OUTPATIENT
Start: 2023-06-23 | End: 2023-06-23

## 2023-06-23 RX ORDER — METHOCARBAMOL 750 MG/1
1500 TABLET, FILM COATED ORAL 3 TIMES DAILY PRN
Qty: 30 TABLET | Refills: 0 | Status: SHIPPED | OUTPATIENT
Start: 2023-06-23 | End: 2023-06-28

## 2023-06-23 RX ORDER — ONDANSETRON 2 MG/ML
8 INJECTION INTRAMUSCULAR; INTRAVENOUS ONCE AS NEEDED
Status: COMPLETED | OUTPATIENT
Start: 2023-06-23 | End: 2023-06-23

## 2023-06-23 RX ORDER — NAPROXEN 500 MG/1
500 TABLET ORAL 2 TIMES DAILY PRN
Qty: 28 TABLET | Refills: 0 | Status: SHIPPED | OUTPATIENT
Start: 2023-06-23 | End: 2023-07-07

## 2023-06-23 RX ORDER — HYDROCODONE BITARTRATE AND ACETAMINOPHEN 5; 325 MG/1; MG/1
1 TABLET ORAL EVERY 4 HOURS PRN
Qty: 10 TABLET | Refills: 0 | Status: SHIPPED | OUTPATIENT
Start: 2023-06-23 | End: 2023-06-26

## 2023-06-23 RX ORDER — LIDOCAINE 50 MG/G
PATCH TOPICAL
Qty: 30 PATCH | Refills: 0 | Status: SHIPPED | OUTPATIENT
Start: 2023-06-23

## 2023-06-23 RX ORDER — METHOCARBAMOL 750 MG/1
1500 TABLET, FILM COATED ORAL 3 TIMES DAILY PRN
Qty: 30 TABLET | Refills: 0 | Status: SHIPPED | OUTPATIENT
Start: 2023-06-23 | End: 2023-06-23 | Stop reason: SDUPTHER

## 2023-06-23 RX ORDER — ACETAMINOPHEN 325 MG/1
650 TABLET ORAL EVERY 6 HOURS PRN
Qty: 30 TABLET | Refills: 0 | Status: SHIPPED | OUTPATIENT
Start: 2023-06-23

## 2023-06-23 RX ORDER — DICLOFENAC SODIUM 10 MG/G
2 GEL TOPICAL 3 TIMES DAILY PRN
Qty: 100 G | Refills: 0 | Status: SHIPPED | OUTPATIENT
Start: 2023-06-23

## 2023-06-23 RX ORDER — METOCLOPRAMIDE HYDROCHLORIDE 5 MG/ML
10 INJECTION INTRAMUSCULAR; INTRAVENOUS ONCE AS NEEDED
Status: DISCONTINUED | OUTPATIENT
Start: 2023-06-23 | End: 2023-06-23 | Stop reason: HOSPADM

## 2023-06-23 RX ADMIN — METHOCARBAMOL 750 MG: 750 TABLET ORAL at 12:06

## 2023-06-23 RX ADMIN — KETOROLAC TROMETHAMINE 10 MG: 30 INJECTION, SOLUTION INTRAMUSCULAR; INTRAVENOUS at 12:06

## 2023-06-23 RX ADMIN — OXYCODONE HYDROCHLORIDE 5 MG: 5 TABLET ORAL at 01:06

## 2023-06-23 RX ADMIN — ONDANSETRON 8 MG: 2 INJECTION INTRAMUSCULAR; INTRAVENOUS at 12:06

## 2023-06-23 NOTE — ED NOTES
"Pt states "the shot helped with the minor pains but not the big pains". Pt is able to move around in bed without difficulty.  "

## 2023-06-23 NOTE — ED PROVIDER NOTES
"  Source of History:  Medical record, patient, EMS.    Chief complaint:  Per triage note: "Back Pain (Pt arrives via EMS for lower back pain that shoots down lower extremities after falling about 6 feet off of porch. )  "    HPI:    Patient presents for evaluation after falling off a porch onto her back just prior to arrival onto her back.  She denies any loss of consciousness.  She notes abrasion and contusion to her left posterior shoulder but denies any significant left shoulder pain.  Her back pain is worse at her left flank.  It is improved with putting pressure and holding the area.  She denies any focal deficits.  History is limited by patient condition - she was tearful, moaning, difficult to redirect.   She states that she already was having chronic low back pain prior to the episode which acutely worsened her pain.    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  BP (!) 154/62   Pulse 98   Temp 98.4 °F (36.9 °C)   Resp 18   Wt 68 kg (149 lb 14.6 oz)   SpO2 97%   BMI 24.95 kg/m²   Constitutional: AAOx3. Moaning. Tearful. Inconsistently " participates in exam and interview. Very difficult to redirect. Attempts to continuously keep her hand over left flank.   HENT:   Oropharynx clear, no palpable skull deformity, no nasal septal hematoma, no otorrhea, no orta sign or raccoon eyes.  Mouth/Throat: Oropharynx is clear and moist.  Eyes: EOMI.  PERRL. No discharge. Anicteric.  Neck:  Cervical collar in place.  trachea midline, no crepitus, no seatbelt sign, no midline spinal tenderness, no palpable bony step-offs or deformities.   Cardiovascular: Normal rate. No murmur, no gallop and no friction rub heard.   Pulmonary/Chest: BS Clear and equal Bilaterally; chest rise symmetric, no flail segments. No reproducible chest wall tenderness. No crepitus.   Abdominal: No abrasions or contusions, soft, ND/NT, no guarding/rebound.    Musculoskeletal: No midline spinal tenderness. No palpable bony step-offs or deformities.   Circulation: 2+ radial and DP pulses. Normal capillary refill.  NEURO:  GCS: 15   Gross deficits in light touch or strength: None  Skin: Skin is warm and dry    Medical Decision Making / Independent Interpretations / External Records Reviewed:      Patient is a 27-year-old female with sickle cell anemia who presents with back pain after falling off a porch just prior to arrival.  She denies any focal deficits.  On exam, she was initially in distress due to pain, difficult to redirect, with difficulty attending to exam and interview.   She had no midline spinal tenderness, no back tenderness, abdominal tenderness, evidence of head injury, no reported loss of consciousness.    The initial differential included contusion, lumbar fracture.     ED Course as of 06/23/23 0139   Thu Jun 22, 2023   0219 Patient appears more comfortable, is much more calm. She reports significant improvement of her pain.   I independently interpreted and reviewed the patient's films, notable for no acute fracture, dislocation, or radiopaque foreign body.      Now she is  "able to clearly state that her fall was from a second floor balcony and her fall was at least 10-12 ft.  She initially endorsed a fall approximately 5 ft, and EMS report to me was that she fell off a porch, hence workup was initially predicated on these reports.   Given the significantly greater risk of significant injury from her reported higher height, I ordered CTs of her head, cervical spine, abdomen/pelvis.  [RC]   Fri Jun 23, 2023   0011 I independently reviewed and interpreted CT head and cervical spine which show no acute intracranial bleeding, mass, skull fracture, acute intracranial process, vertebral fracture, or cord injury.    I independently reviewed and interpreted CT abdomen/pelvis, notable for no free air, fluid collection, or evidence of obstructive process.     [RC]   0027 I independently interpreted and reviewed the patient's CT abdomen/pelvis, notable for "Nondisplaced fractures of the left L2, L3, and L4 transverse processes." [RC]   0043 Notify the patient of her acute findings on her CT.  She clarifies that she was with her brother, alert family on the balcony.  She had previously noted that the railing appeared loose or weak.  She brushed up against the railing which apparently gave way because the next thing she knew, she was on the ground.   She denies any focal deficits.  She states that her pain is much improved since her arrival, but requests another dose of analgesics.  TLSO brace is not immediately available. Outpatient case management order and referrals for pain management, physical therapy, back & spine clinic placed. [RC]   0118 Pt discussed with [facility] rep who states he will see the patient shortly for LSO placement.  [RC]      ED Course User Index  [RC] Vance Baig MD       I decided to obtain the patient's medical records. I reviewed patient's prior external notes / results: specialist note   and external hospital emergency department encounter.       Medications "   LIDOcaine 5 % patch 1 patch (1 patch Transdermal Patch Applied 6/22/23 2033)   metoclopramide HCl injection 10 mg (has no administration in time range)   HYDROcodone-acetaminophen  mg per tablet 1 tablet (1 tablet Oral Given 6/22/23 2012)   orphenadrine injection 60 mg (60 mg Intramuscular Given 6/22/23 2059)   HYDROmorphone injection 1 mg (1 mg Intramuscular Given 6/22/23 2033)   methocarbamoL tablet 750 mg (750 mg Oral Given 6/23/23 0014)   ketorolac injection 9.999 mg (9.999 mg Intravenous Given 6/23/23 0016)   iohexoL (OMNIPAQUE 350) injection 75 mL (75 mLs Intravenous Given 6/22/23 2347)   ondansetron injection 8 mg (8 mg Intravenous Given 6/23/23 0016)   oxyCODONE immediate release tablet 5 mg (5 mg Oral Given 6/23/23 0112)            No future appointments.     Diagnostic Impression:    1. Closed fracture of transverse process of lumbar vertebra, initial encounter         ED Disposition Condition    Discharge Good          ED Prescriptions       Medication Sig Dispense Start Date End Date Auth. Provider    naproxen (NAPROSYN) 500 MG tablet Take 1 tablet (500 mg total) by mouth 2 (two) times daily as needed (pain). 28 tablet 6/23/2023 7/7/2023 Vance Baig MD    acetaminophen (TYLENOL) 325 MG tablet Take 2 tablets (650 mg total) by mouth every 6 (six) hours as needed for Pain or Temperature greater than (100.3). 30 tablet 6/23/2023 -- Vance Baig MD    LIDOcaine (LIDODERM) 5 % Apply to affected area as needed for pain for 12 hours, then off for 12 hours. Discard after each use 30 patch 6/23/2023 -- Vance Baig MD    diclofenac sodium (VOLTAREN) 1 % Gel Apply 2 g topically 3 (three) times daily as needed (joint or muscle pain). Apply 2 grams to affected area 3 times daily as needed 100 g 6/23/2023 -- Vance Baig MD    HYDROcodone-acetaminophen (NORCO) 5-325 mg per tablet Take 1 tablet by mouth every 4 (four) hours as needed (severe pain not improved by other measures.). 10 tablet  6/23/2023 6/26/2023 Vance Baig MD    methocarbamoL (ROBAXIN) 750 MG Tab  (Status: Discontinued) Take 2 tablets (1,500 mg total) by mouth 3 (three) times daily as needed (Muscle spasm pain). 30 tablet 6/23/2023 6/23/2023 Vance Baig MD    methocarbamoL (ROBAXIN) 750 MG Tab Take 2 tablets (1,500 mg total) by mouth 3 (three) times daily as needed (Muscle spasm pain). 30 tablet 6/23/2023 6/28/2023 Vance Baig MD          Follow-up Information       Follow up With Specialties Details Why Contact Info Additional Information    Esteban Powell MD Pediatrics Schedule an appointment as soon as possible for a visit  For recheck with specialist [facility] [address]  [phone]       [facility] Schedule an appointment as soon as possible for a visit  For recheck with specialist [address], [address]  [facility] [address] [phone] [facility] - [facility], 4th Floor Please park in [address] and use [address] elevators    [facility] Pain Medicine Schedule an appointment as soon as possible for a visit  For recheck with pain specialist [address]  [facility] [address] [phone] Pain Management Clinic - [facility], 9th Floor, [address] Please park in [address] and use [address] elevators    [facility] Physical Therapy Schedule an appointment as soon as possible for a visit  For recheck with specialist [address]  [facility] [id number] [phone]                Vance Baig MD  06/23/23 9309

## 2023-06-23 NOTE — DISCHARGE INSTRUCTIONS
Thank you for letting us take care of you today! It was nice meeting you, and I hope you feel better soon.     Call your primary care doctor to make the first available appointment.     Keep all your medical appointments.     Take your regular medication as prescribed. Contact your primary care provider before running out of medication, or for any problems obtaining them.    Do not drive or operate heavy machinery while taking opioid or muscle relaxing medications. Examples include norco, percocet, xanax, valium, flexeril.     Overuse or long term use of pain and sedating medication may lead to addiction, dependence, organ failure, family and work problems, legal problems, accidental overdose, and death.    If you do not have health insurance, you probably can afford it:  Call [phone] ([facility] hotline) or go to www.[place].gov    Your evaluation in the ER does not suggest any emergent or life threatening medical condition requiring admission or immediate intervention beyond that provided in the ER.   However, the signs of a serious problem sometimes take more time to appear.     Do not hesitate to return to the ER if any of the following occur:    Weakness, dizziness, fainting, or loss of consciousness   Fever of 100.4ºF (38ºC) or higher  Any worse symptoms  Any new or concerning symptoms    To protect yourself and others from COVID19:  Get vaccinated.   Anyone over 6 months old is eligible for vaccination.   If your last dose was over 6 months ago, you are probably due for another shot.   Vaccination is shown to prevent getting sick, ending up in the hospital, or dying because of COVID19.     If not vaccinated or over a long time since your last dose:  Your shot is waiting for you. To get it:   Text your ZIP code to GETVAX ([id number]) or VACUNA ([id number]) in Croatian  call 311, or [phone], or [phone], or [phone],   go to www.vaccines.gov, or  Call your health provider    If exposed to someone with  cold, flu, or COVID symptoms, consider quarantining or at least wearing a mask around others for at least 5 days.   If exposed to someone with COVID19, wear a mask around others for 10 days, and test yourself at 5 days if you have no symptoms.    Every [place] household is eligible to order 4 free at-home COVID-19 tests from www.covidtests.gov      Contusion is the medical term for a bruise. It is the result of a direct blow or an impact, such as a fall. Contusions are common sports injuries.    Most people think of a bruise as a black-and-blue spot. This happens when small blood vessels get torn and leak blood under the skin. But bones, muscles, and organs can also get bruised. This may damage deep tissues but not cause a bruise you can see.    The doctor will do a physical exam to find the location of your contusion. You may also have tests to make sure you do not have a more serious injury, such as a broken bone or nerve damage. These may include X-rays or other imaging tests like a CT scan or MRI.    Deep-tissue contusions may cause pain and swelling. But if there is no serious damage, they will often get better in a few weeks with home treatment.    The doctor has checked you carefully, but problems can develop later. If you notice any problems or new symptoms, get medical treatment right away.    Follow-up care is a key part of your treatment and safety. Be sure to make and go to all appointments, and call your doctor if you are having problems. It's also a good idea to know your test results and keep a list of the medicines you take.    How can you care for yourself at home?  Put ice or a cold pack on the sore area for 10 to 20 minutes at a time to stop swelling. Put a thin cloth between the ice pack and your skin.  Be safe with medicines. Read and follow all instructions on the label.  If the doctor gave you a prescription medicine for pain, take it as prescribed.  If you are not taking a prescription pain  medicine, ask your doctor if you can take an over-the-counter medicine.  If you can, prop up the sore area on pillows as much as possible for the next few days. Try to keep the sore area above the level of your heart.

## 2023-06-23 NOTE — ED TRIAGE NOTES
C/o lower back pain that shoots down lower extremities after falling off porch +pms intact. Pt rolling around in bed. Aaox3

## 2023-06-26 ENCOUNTER — TELEPHONE (OUTPATIENT)
Dept: ORTHOPEDICS | Facility: CLINIC | Age: 28
End: 2023-06-26
Payer: MEDICAID

## 2023-06-29 ENCOUNTER — TELEPHONE (OUTPATIENT)
Dept: ADMINISTRATIVE | Facility: OTHER | Age: 28
End: 2023-06-29
Payer: MEDICAID

## 2023-08-24 ENCOUNTER — HOSPITAL ENCOUNTER (EMERGENCY)
Facility: OTHER | Age: 28
Discharge: HOME | End: 2023-08-24
Attending: EMERGENCY MEDICINE
Payer: MEDICAID

## 2023-08-24 VITALS
SYSTOLIC BLOOD PRESSURE: 104 MMHG | HEART RATE: 87 BPM | BODY MASS INDEX: 25.49 KG/M2 | HEIGHT: 65 IN | TEMPERATURE: 98 F | RESPIRATION RATE: 18 BRPM | WEIGHT: 153 LBS | DIASTOLIC BLOOD PRESSURE: 52 MMHG | OXYGEN SATURATION: 100 %

## 2023-08-24 DIAGNOSIS — N12 PYELONEPHRITIS: Primary | ICD-10-CM

## 2023-08-24 DIAGNOSIS — R00.0 TACHYCARDIA: ICD-10-CM

## 2023-08-24 LAB
ALBUMIN SERPL BCP-MCNC: 3.9 G/DL (ref 3.5–5.2)
ALP SERPL-CCNC: 83 U/L (ref 55–135)
ALT SERPL W/O P-5'-P-CCNC: 19 U/L (ref 10–44)
ANION GAP SERPL CALC-SCNC: 10 MMOL/L (ref 8–16)
AST SERPL-CCNC: 29 U/L (ref 10–40)
B-HCG UR QL: NEGATIVE
BACTERIA #/AREA URNS HPF: ABNORMAL /HPF
BASOPHILS # BLD AUTO: 0.01 K/UL (ref 0–0.2)
BASOPHILS NFR BLD: 0.2 % (ref 0–1.9)
BILIRUB SERPL-MCNC: 1.7 MG/DL (ref 0.1–1)
BILIRUB UR QL STRIP: NEGATIVE
BUN SERPL-MCNC: 7 MG/DL (ref 6–20)
CALCIUM SERPL-MCNC: 8.6 MG/DL (ref 8.7–10.5)
CHLORIDE SERPL-SCNC: 104 MMOL/L (ref 95–110)
CLARITY UR: ABNORMAL
CO2 SERPL-SCNC: 21 MMOL/L (ref 23–29)
COLOR UR: ABNORMAL
CREAT SERPL-MCNC: 0.8 MG/DL (ref 0.5–1.4)
CTP QC/QA: YES
DIFFERENTIAL METHOD: ABNORMAL
EOSINOPHIL # BLD AUTO: 0 K/UL (ref 0–0.5)
EOSINOPHIL NFR BLD: 0.5 % (ref 0–8)
ERYTHROCYTE [DISTWIDTH] IN BLOOD BY AUTOMATED COUNT: 15.6 % (ref 11.5–14.5)
EST. GFR  (NO RACE VARIABLE): >60 ML/MIN/1.73 M^2
GLUCOSE SERPL-MCNC: 86 MG/DL (ref 70–110)
GLUCOSE UR QL STRIP: NEGATIVE
HCT VFR BLD AUTO: 23 % (ref 37–48.5)
HCV AB SERPL QL IA: NEGATIVE
HGB BLD-MCNC: 8.2 G/DL (ref 12–16)
HGB UR QL STRIP: ABNORMAL
HIV 1+2 AB+HIV1 P24 AG SERPL QL IA: NEGATIVE
HYALINE CASTS #/AREA URNS LPF: 0 /LPF
IMM GRANULOCYTES # BLD AUTO: 0.03 K/UL (ref 0–0.04)
IMM GRANULOCYTES NFR BLD AUTO: 0.5 % (ref 0–0.5)
KETONES UR QL STRIP: NEGATIVE
LDH SERPL L TO P-CCNC: 0.49 MMOL/L (ref 0.5–2.2)
LEUKOCYTE ESTERASE UR QL STRIP: ABNORMAL
LYMPHOCYTES # BLD AUTO: 0.4 K/UL (ref 1–4.8)
LYMPHOCYTES NFR BLD: 7.3 % (ref 18–48)
MCH RBC QN AUTO: 30.3 PG (ref 27–31)
MCHC RBC AUTO-ENTMCNC: 35.7 G/DL (ref 32–36)
MCV RBC AUTO: 85 FL (ref 82–98)
MICROSCOPIC COMMENT: ABNORMAL
MONOCYTES # BLD AUTO: 0.3 K/UL (ref 0.3–1)
MONOCYTES NFR BLD: 5.2 % (ref 4–15)
NEUTROPHILS # BLD AUTO: 5.2 K/UL (ref 1.8–7.7)
NEUTROPHILS NFR BLD: 86.3 % (ref 38–73)
NITRITE UR QL STRIP: NEGATIVE
NRBC BLD-RTO: 0 /100 WBC
PH UR STRIP: 6 [PH] (ref 5–8)
PLATELET # BLD AUTO: 104 K/UL (ref 150–450)
PMV BLD AUTO: ABNORMAL FL (ref 9.2–12.9)
POC MOLECULAR INFLUENZA A AGN: NEGATIVE
POC MOLECULAR INFLUENZA B AGN: NEGATIVE
POTASSIUM SERPL-SCNC: 3.6 MMOL/L (ref 3.5–5.1)
PROT SERPL-MCNC: 7.5 G/DL (ref 6–8.4)
PROT UR QL STRIP: ABNORMAL
RBC # BLD AUTO: 2.71 M/UL (ref 4–5.4)
RBC #/AREA URNS HPF: >100 /HPF (ref 0–4)
SAMPLE: ABNORMAL
SARS-COV-2 RDRP RESP QL NAA+PROBE: NEGATIVE
SODIUM SERPL-SCNC: 135 MMOL/L (ref 136–145)
SP GR UR STRIP: 1.01 (ref 1–1.03)
URN SPEC COLLECT METH UR: ABNORMAL
UROBILINOGEN UR STRIP-ACNC: NEGATIVE EU/DL
WBC # BLD AUTO: 5.99 K/UL (ref 3.9–12.7)
WBC #/AREA URNS HPF: 9 /HPF (ref 0–5)

## 2023-08-24 PROCEDURE — 87088 URINE BACTERIA CULTURE: CPT | Performed by: NURSE PRACTITIONER

## 2023-08-24 PROCEDURE — 87077 CULTURE AEROBIC IDENTIFY: CPT | Performed by: NURSE PRACTITIONER

## 2023-08-24 PROCEDURE — 25000003 PHARM REV CODE 250: Performed by: NURSE PRACTITIONER

## 2023-08-24 PROCEDURE — 63600175 PHARM REV CODE 636 W HCPCS: Performed by: EMERGENCY MEDICINE

## 2023-08-24 PROCEDURE — 87086 URINE CULTURE/COLONY COUNT: CPT | Performed by: NURSE PRACTITIONER

## 2023-08-24 PROCEDURE — 63600175 PHARM REV CODE 636 W HCPCS: Performed by: NURSE PRACTITIONER

## 2023-08-24 PROCEDURE — 99285 EMERGENCY DEPT VISIT HI MDM: CPT | Mod: 25

## 2023-08-24 PROCEDURE — 93010 ELECTROCARDIOGRAM REPORT: CPT | Mod: ,,, | Performed by: INTERNAL MEDICINE

## 2023-08-24 PROCEDURE — 86803 HEPATITIS C AB TEST: CPT | Performed by: NURSE PRACTITIONER

## 2023-08-24 PROCEDURE — 81000 URINALYSIS NONAUTO W/SCOPE: CPT | Performed by: NURSE PRACTITIONER

## 2023-08-24 PROCEDURE — 87635 SARS-COV-2 COVID-19 AMP PRB: CPT | Performed by: NURSE PRACTITIONER

## 2023-08-24 PROCEDURE — 93005 ELECTROCARDIOGRAM TRACING: CPT

## 2023-08-24 PROCEDURE — 80053 COMPREHEN METABOLIC PANEL: CPT | Performed by: NURSE PRACTITIONER

## 2023-08-24 PROCEDURE — 96365 THER/PROPH/DIAG IV INF INIT: CPT

## 2023-08-24 PROCEDURE — 81025 URINE PREGNANCY TEST: CPT | Performed by: NURSE PRACTITIONER

## 2023-08-24 PROCEDURE — 96375 TX/PRO/DX INJ NEW DRUG ADDON: CPT

## 2023-08-24 PROCEDURE — 87186 SC STD MICRODIL/AGAR DIL: CPT | Performed by: NURSE PRACTITIONER

## 2023-08-24 PROCEDURE — 87389 HIV-1 AG W/HIV-1&-2 AB AG IA: CPT | Performed by: NURSE PRACTITIONER

## 2023-08-24 PROCEDURE — 85025 COMPLETE CBC W/AUTO DIFF WBC: CPT | Performed by: NURSE PRACTITIONER

## 2023-08-24 PROCEDURE — 93010 EKG 12-LEAD: ICD-10-PCS | Mod: ,,, | Performed by: INTERNAL MEDICINE

## 2023-08-24 PROCEDURE — 87040 BLOOD CULTURE FOR BACTERIA: CPT | Performed by: NURSE PRACTITIONER

## 2023-08-24 RX ORDER — ACETAMINOPHEN 500 MG
1000 TABLET ORAL
Status: COMPLETED | OUTPATIENT
Start: 2023-08-24 | End: 2023-08-24

## 2023-08-24 RX ORDER — KETOROLAC TROMETHAMINE 30 MG/ML
30 INJECTION, SOLUTION INTRAMUSCULAR; INTRAVENOUS
Status: COMPLETED | OUTPATIENT
Start: 2023-08-24 | End: 2023-08-24

## 2023-08-24 RX ORDER — MORPHINE SULFATE 4 MG/ML
4 INJECTION, SOLUTION INTRAMUSCULAR; INTRAVENOUS
Status: COMPLETED | OUTPATIENT
Start: 2023-08-24 | End: 2023-08-24

## 2023-08-24 RX ORDER — HYDROCODONE BITARTRATE AND ACETAMINOPHEN 5; 325 MG/1; MG/1
1 TABLET ORAL EVERY 4 HOURS PRN
Qty: 12 TABLET | Refills: 0 | Status: SHIPPED | OUTPATIENT
Start: 2023-08-24

## 2023-08-24 RX ORDER — SULFAMETHOXAZOLE AND TRIMETHOPRIM 800; 160 MG/1; MG/1
1 TABLET ORAL 2 TIMES DAILY
Qty: 20 TABLET | Refills: 0 | Status: SHIPPED | OUTPATIENT
Start: 2023-08-24

## 2023-08-24 RX ORDER — ONDANSETRON 2 MG/ML
4 INJECTION INTRAMUSCULAR; INTRAVENOUS
Status: COMPLETED | OUTPATIENT
Start: 2023-08-24 | End: 2023-08-24

## 2023-08-24 RX ADMIN — CEFTRIAXONE SODIUM 2 G: 2 INJECTION, POWDER, FOR SOLUTION INTRAMUSCULAR; INTRAVENOUS at 03:08

## 2023-08-24 RX ADMIN — ONDANSETRON 4 MG: 2 INJECTION INTRAMUSCULAR; INTRAVENOUS at 06:08

## 2023-08-24 RX ADMIN — ACETAMINOPHEN 1000 MG: 500 TABLET ORAL at 01:08

## 2023-08-24 RX ADMIN — MORPHINE SULFATE 4 MG: 4 INJECTION, SOLUTION INTRAMUSCULAR; INTRAVENOUS at 06:08

## 2023-08-24 RX ADMIN — KETOROLAC TROMETHAMINE 30 MG: 30 INJECTION, SOLUTION INTRAMUSCULAR; INTRAVENOUS at 05:08

## 2023-08-24 RX ADMIN — SODIUM CHLORIDE 2082 ML: 9 INJECTION, SOLUTION INTRAVENOUS at 03:08

## 2023-08-24 NOTE — FIRST PROVIDER EVALUATION
"Medical screening examination initiated.  I have conducted a focused provider triage encounter, findings are as follows:    Brief history of present illness:  fever, body aches, cloudy urine x 2 days.  Also reports right facial pain    Vitals:    08/24/23 1321   BP: (!) 120/93   BP Location: Left arm   Patient Position: Sitting   Pulse: (!) 120   Resp: (!) 24   Temp: (!) 102.9 °F (39.4 °C)   TempSrc: Oral   SpO2: 99%   Weight: 69.4 kg (153 lb)   Height: 5' 5" (1.651 m)       Pertinent physical exam:  ill appearing, febrile    Brief workup plan:  flu, covid, ua    Preliminary workup initiated; this workup will be continued and followed by the physician or advanced practice provider that is assigned to the patient when roomed.  "

## 2023-08-24 NOTE — FIRST PROVIDER EVALUATION
Emergency Department TeleTriage Encounter Note      CHIEF COMPLAINT    Chief Complaint   Patient presents with    Generalized Body Aches     C/o facial pain and pain to her entire body since waking up this morning. Also c/o cloudy urine this morning that is now bloody    Facial Pain     C/o right facial pain, possibly due to cracked tooth.        VITAL SIGNS   Initial Vitals [08/24/23 1321]   BP Pulse Resp Temp SpO2   (!) 120/93 (!) 120 (!) 24 (!) 102.9 °F (39.4 °C) 99 %      MAP       --            ALLERGIES    Review of patient's allergies indicates:  No Known Allergies    PROVIDER TRIAGE NOTE  Verbal consent for the teletriage evaluation was given by the patient at the start of the evaluation.  All efforts will be made to maintain patient's privacy during the evaluation.      This is a teletriage evaluation of a 28 y.o. female presenting to the ED with c/o body aches, fever, and cloudy urine that started this AM.  Also reports dental pain from a cracked tooth. Limited physical exam via telehealth: The patient is awake, alert, answering questions appropriately and is not in respiratory distress.  As the Teletriage provider, I performed an initial assessment and ordered appropriate labs and imaging studies, if any, to facilitate the patient's care once placed in the ED. Once a room is available, care and a full evaluation will be completed by an alternate ED provider.  Any additional orders and the final disposition will be determined by that provider.  All imaging and labs will not be followed-up by the Teletriage Team, including myself.          ORDERS  Labs Reviewed   URINALYSIS, REFLEX TO URINE CULTURE - Abnormal; Notable for the following components:       Result Value    Color, UA Red (*)     Appearance, UA Cloudy (*)     Protein, UA 2+ (*)     Occult Blood UA 3+ (*)     Leukocytes, UA 2+ (*)     All other components within normal limits    Narrative:     Specimen Source->Urine   URINALYSIS MICROSCOPIC -  Abnormal; Notable for the following components:    RBC, UA >100 (*)     WBC, UA 9 (*)     All other components within normal limits    Narrative:     Specimen Source->Urine   HIV 1 / 2 ANTIBODY   HEPATITIS C ANTIBODY   SARS-COV-2 RDRP GENE    Narrative:     This test utilizes isothermal nucleic acid amplification technology to detect the SARS-CoV-2 RdRp nucleic acid segment. The analytical sensitivity (limit of detection) is 500 copies/swab.     A POSITIVE result is indicative of the presence of SARS-CoV-2 RNA; clinical correlation with patient history and other diagnostic information is necessary to determine patient infection status.    A NEGATIVE result means that SARS-CoV-2 nucleic acids are not present above the limit of detection. A NEGATIVE result should be treated as presumptive. It does not rule out the possibility of COVID-19 and should not be the sole basis for treatment decisions. If COVID-19 is strongly suspected based on clinical and exposure history, re-testing using an alternate molecular assay should be considered.     This test is only for use under the Food and Drug Administration s Emergency Use Authorization (EUA).     Commercial kits are provided by [employer]. Performance characteristics of the EUA have been independently verified by [facility] Department of Pathology and Laboratory Medicine.   _________________________________________________________________   The authorized Fact Sheet for Healthcare Providers and the authorized Fact Sheet for Patients of the ID NOW COVID-19 are available on the FDA website:    https://www.fda.gov/media/463699/download      https://www.fda.gov/media/984719/download      POCT INFLUENZA A/B MOLECULAR       ED Orders (720h ago, onward)      Start Ordered     Status Ordering Provider    08/24/23 1837 08/24/23 1439  POCT Lactate #2  Once        Comments: This test should be used for VBGs.  If using this order for other tests (K, creatinine,  HCT, PT/INR, lactate etc)  ONLY do so in the case of an emergency or rapid response.Notify Physician if: see parameters below.      Ordered JOSE KIDD    08/24/23 1445 08/24/23 1439  cefTRIAXone (ROCEPHIN) 2 g in dextrose 5 % in water (D5W) 100 mL IVPB (MB+)  Once         Ordered JOSE KIDD    08/24/23 1437 08/24/23 1439  ED Preference List Used to Initiate Sepsis Orders  Until discontinued         Ordered JOSE KIDD    08/24/23 1437 08/24/23 1439  Blood culture x two cultures. Draw prior to antibiotics.  Every 15 min      Comments: Aerobic and anaerobic     Start Status Ordering Provider   08/24/23 1437 Scheduled JOSE KIDD   08/24/23 1452 Scheduled MARTI JOSE MARIO       Ordered JOSE KIDD    08/24/23 1437 08/24/23 1439  CBC auto differential  STAT         Ordered JOSE KIDD    08/24/23 1437 08/24/23 1439  Comprehensive metabolic panel  STAT         Ordered JOSE KIDD    08/24/23 1437 08/24/23 1439  Vital signs  Every 15 min        Comments: Every 15 minutes until SBP greater than 90 or MAP greater than 65, then every 30 minutes times one hour, then every one hour.    Ordered JOSE KIDD    08/24/23 1437 08/24/23 1439  Bed rest  Until discontinued         Ordered JOSE KIDD    08/24/23 1437 08/24/23 1439  Cardiac Monitoring - Adult  Continuous        Comments: Notify Physician If:    Ordered JOSE KIDD    08/24/23 1437 08/24/23 1439  Pulse Oximetry Continuous  Continuous         Ordered JOSE KIDD    08/24/23 1437 08/24/23 1439  Strict intake and output  Until discontinued         Ordered MARTI JOSE MARTIN    08/24/23 1437 08/24/23 1439  Saline lock IV  Once         Ordered MARTI JOSE MARTIN    08/24/23 1437 08/24/23 1439  EKG 12-lead  Once         Ordered JOSE KIDD    08/24/23 1437 08/24/23 1439  X-Ray Chest AP Portable  1 time imaging         Ordered MARTI JOSE MARTIN    08/24/23 1437 08/24/23 1439  POCT Lactate #1  Once        Comments: This test should be used for VBGs.  If using this  order for other tests (K, creatinine, HCT, PT/INR, lactate etc)  ONLY do so in the case of an emergency or rapid response.Notify Physician if: see parameters below.      Ordered JOSE KIDD    08/24/23 1330 08/24/23 1323  acetaminophen tablet 1,000 mg  ED 1 Time         Last MAR action: Given - by JONATHAN BACH on 08/24/23 at 1327 MANNY WISE.    08/24/23 1324 08/24/23 1323  POCT Influenza A/B Molecular  Once         Final result MANNY WISE.    08/24/23 1324 08/24/23 1323  Urinalysis, Reflex to Urine Culture Urine, Clean Catch  STAT         Final result MANNY WISE.    08/24/23 1324 08/24/23 1323  HIV 1/2 Ag/Ab (4th Gen)  STAT         In process MANNY WIES    08/24/23 1324 08/24/23 1323  Hepatitis C Antibody  STAT         In process MANNY WISE    08/24/23 1323 08/24/23 1323  POCT COVID-19 Rapid Screening  Once         Edited Result - FINAL MANNY WISE    08/24/23 1323 08/24/23 1323  Urinalysis Microscopic  Once         Final result MANNY WISE    Unscheduled 08/24/23 1439  POCT urine pregnancy  Once         Ordered JOSE KIDD              Virtual Visit Note: The provider triage portion of this emergency department evaluation and documentation was performed via iCouch, a HIPAA-compliant telemedicine application, in concert with a tele-presenter in the room. A face to face patient evaluation with one of my colleagues will occur once the patient is placed in an emergency department room.      DISCLAIMER: This note was prepared with Tivra voice recognition transcription software. Garbled syntax, mangled pronouns, and other bizarre constructions may be attributed to that software system.

## 2023-08-24 NOTE — ED PROVIDER NOTES
Encounter Date: 2023       History     Chief Complaint   Patient presents with    Generalized Body Aches     C/o facial pain and pain to her entire body since waking up this morning. Also c/o cloudy urine this morning that is now bloody    Facial Pain     C/o right facial pain, possibly due to cracked tooth.      Seen by physician at 3:45PM:    Patient is a 28-year-old female who presents to the emergency department with hematuria, urinary urgency, myalgias, and fever.  Patient started having a cloudy urine 2 days ago with some urinary urgency.  Today she started having hematuria with myalgias and fever.  She also feels very dehydrated along with feeling weak and fatigued.  She denies any nausea/vomiting/diarrhea.  She denies any actual dysuria.  She denies any chest pain or shortness of breath.    Also, patient had a cracked tooth approximately 1 month ago that has caused her persistent pain.  She has an appointment in 2 weeks to get the tooth pulled.  She denies any significant facial swelling or difficulty tolerating secretions.      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           Past Surgical History:   Procedure Laterality Date     SECTION  2014     delivery x 2     SECTION N/A 7/10/2020    Procedure:  SECTION;  Surgeon: Zoila Donaldson MD;  Location: [facility], [place], operated by [facility];  Service: OB/GYN;  Laterality: N/A;    EYE SURGERY      left eye     Family History   Problem Relation Age of Onset    Breast cancer Neg Hx     Colon cancer Neg Hx     Ovarian cancer Neg Hx     Cancer Neg Hx     Diabetes Neg Hx     Eclampsia Neg Hx     Hypertension Neg Hx     Miscarriages / Stillbirths Neg Hx      labor Neg Hx     Stroke Neg Hx       Social History     Tobacco Use    Smoking status: Some Days     Current packs/day: 0.00     Types: Cigarettes    Smokeless tobacco: Never   Substance Use Topics    Alcohol use: No    Drug use: No     Review of Systems   Constitutional:  Positive for chills, fatigue and fever.   HENT:  Positive for dental problem. Negative for congestion and rhinorrhea.    Respiratory:  Negative for chest tightness and shortness of breath.    Cardiovascular:  Negative for chest pain and palpitations.   Gastrointestinal:  Negative for abdominal pain, diarrhea, nausea and vomiting.   Genitourinary:  Positive for hematuria and urgency. Negative for dysuria and flank pain.   Musculoskeletal:  Positive for myalgias. Negative for back pain and neck pain.   Skin:  Negative for color change and wound.   Neurological:  Negative for dizziness and headaches.       Physical Exam     Initial Vitals [08/24/23 1321]   BP Pulse Resp Temp SpO2   (!) 120/93 (!) 120 (!) 24 (!) 102.9 °F (39.4 °C) 99 %      MAP       --         Physical Exam    Nursing note and vitals reviewed.  Constitutional: She appears well-developed and well-nourished.   HENT:   Head: Normocephalic and atraumatic.   Dry mucous membranes    No appreciable facial swelling.  Tooth 2 which is cracked with significant decay.  No periapical swelling, erythema, or evidence of abscess.     Eyes: Conjunctivae are normal.   Neck: Neck supple.   Normal range of motion.  Cardiovascular:  Regular rhythm and normal heart sounds.           Tachycardia   Pulmonary/Chest: Breath sounds normal. No respiratory distress. She has no wheezes. She has no rales.   Abdominal: Abdomen is soft. Bowel sounds are normal. She exhibits no distension. There is no abdominal tenderness. There is no rebound.   Musculoskeletal:         General: Normal range of motion.      Cervical back: Normal range of motion and neck supple.      Comments: Mild CVA tenderness bilaterally     Neurological: She is alert and  oriented to person, place, and time.   Ambulatory with steady gait   Skin: Skin is warm and dry. Capillary refill takes less than 2 seconds.         ED Course   Procedures  Labs Reviewed   URINALYSIS, REFLEX TO URINE CULTURE - Abnormal; Notable for the following components:       Result Value    Color, UA Red (*)     Appearance, UA Cloudy (*)     Protein, UA 2+ (*)     Occult Blood UA 3+ (*)     Leukocytes, UA 2+ (*)     All other components within normal limits    Narrative:     Specimen Source->Urine   URINALYSIS MICROSCOPIC - Abnormal; Notable for the following components:    RBC, UA >100 (*)     WBC, UA 9 (*)     All other components within normal limits    Narrative:     Specimen Source->Urine   CBC W/ AUTO DIFFERENTIAL - Abnormal; Notable for the following components:    RBC 2.71 (*)     Hemoglobin 8.2 (*)     Hematocrit 23.0 (*)     RDW 15.6 (*)     Platelets 104 (*)     Lymph # 0.4 (*)     Gran % 86.3 (*)     Lymph % 7.3 (*)     All other components within normal limits   COMPREHENSIVE METABOLIC PANEL - Abnormal; Notable for the following components:    Sodium 135 (*)     CO2 21 (*)     Calcium 8.6 (*)     Total Bilirubin 1.7 (*)     All other components within normal limits   ISTAT LACTATE - Abnormal; Notable for the following components:    POC Lactate 0.49 (*)     All other components within normal limits   CULTURE, URINE   CULTURE, BLOOD   CULTURE, BLOOD   HIV 1 / 2 ANTIBODY    Narrative:     Release to patient->Immediate   HEPATITIS C ANTIBODY    Narrative:     Release to patient->Immediate   SARS-COV-2 RDRP GENE    Narrative:     This test utilizes isothermal nucleic acid amplification technology to detect the SARS-CoV-2 RdRp nucleic acid segment. The analytical sensitivity (limit of detection) is 500 copies/swab.     A POSITIVE result is indicative of the presence of SARS-CoV-2 RNA; clinical correlation with patient history and other diagnostic information is necessary to determine patient infection  status.    A NEGATIVE result means that SARS-CoV-2 nucleic acids are not present above the limit of detection. A NEGATIVE result should be treated as presumptive. It does not rule out the possibility of COVID-19 and should not be the sole basis for treatment decisions. If COVID-19 is strongly suspected based on clinical and exposure history, re-testing using an alternate molecular assay should be considered.     This test is only for use under the Food and Drug Administration s Emergency Use Authorization (EUA).     Commercial kits are provided by [employer]. Performance characteristics of the EUA have been independently verified by [facility] Department of Pathology and Laboratory Medicine.   _________________________________________________________________   The authorized Fact Sheet for Healthcare Providers and the authorized Fact Sheet for Patients of the ID NOW COVID-19 are available on the FDA website:    https://www.fda.gov/media/531450/download      https://www.fda.gov/media/188520/download      POCT INFLUENZA A/B MOLECULAR   POCT URINE PREGNANCY     EKG Readings: (Independently Interpreted)   2:52PM:  Rate of 95.  Normal sinus rhythm.  Normal axis.  Normal intervals.  No ST or ischemic changes.       Imaging Results              CT Renal Stone Study ABD Pelvis WO (Final result)  Result time 08/24/23 17:21:39      Final result by Ousmane Armando MD (08/24/23 17:21:39)                   Impression:      1. No acute intra-abdominal abnormalities identified.  No evidence of stones or obstructive uropathy.  2. Splenomegaly.  3. Additional findings as detailed above.      Electronically signed by: Ousmane Armando MD  Date:    08/24/2023  Time:    17:21               Narrative:    EXAMINATION:  CT RENAL STONE STUDY ABD PELVIS WO    CLINICAL HISTORY:  hematuria;    TECHNIQUE:  Low dose axial images, sagittal and coronal reformations were obtained from the lung bases to the pubic symphysis.   Contrast was not administered.    COMPARISON:  CT abdomen and pelvis from 06/22/2023.    FINDINGS:  The visualized portion of the heart is unremarkable.  The lung bases are clear.    Spleen is mildly enlarged measuring 19 cm.  No significant focal hepatic abnormality seen on this noncontrast exam.  Spleen is enlarged measuring 18 cm.  There is no intra-or extrahepatic biliary ductal dilatation.  The gallbladder is unremarkable.  The stomach, pancreas, and adrenal glands are unremarkable.    Kidneys show no evidence of stones or hydronephrosis. Ureters are difficult to track, however no stones are seen along their expected courses.  Urinary bladder is nondistended.  Uterus is retroverted.  No significant adnexal abnormalities are seen.    Appendix is visualized and is unremarkable.  The visualized loops of small and large bowel show no evidence of obstruction or inflammation.  There is small umbilical hernia with mild bulging of small bowel loop at this level.  No free air or free fluid.    Aorta tapers normally.    No acute osseous abnormality identified.  Redemonstration of serpiginous sclerosis involving the bilateral femoral heads which is most consistent with avascular necrosis, slightly more pronounced on the left as compared to the right.  Findings are similar compared to prior CT dating back to 2019.                                       X-Ray Chest AP Portable (Final result)  Result time 08/24/23 15:59:16      Final result by Bhumi Rosales MD (08/24/23 15:59:16)                   Impression:      No acute cardiopulmonary process seen.      Electronically signed by: Bhumi Rosales  Date:    08/24/2023  Time:    15:59               Narrative:    EXAMINATION:  XR CHEST AP PORTABLE    CLINICAL HISTORY:  Sepsis;    TECHNIQUE:  Single frontal view of the chest was performed.    COMPARISON:  02/21/2022    FINDINGS:  Mildly reduced lung volumes.  No acute consolidation, pleural effusion, or  pneumothorax.    Cardiac silhouette is normal in size when accounting for technique and mildly reduced lung volumes..                                       Medications   acetaminophen tablet 1,000 mg (1,000 mg Oral Given 8/24/23 1327)   sodium chloride 0.9% bolus 2,082 mL 2,082 mL (0 mLs Intravenous Stopped 8/24/23 1654)   cefTRIAXone (ROCEPHIN) 2 g in dextrose 5 % in water (D5W) 100 mL IVPB (MB+) (0 g Intravenous Stopped 8/24/23 1636)   ketorolac injection 30 mg (30 mg Intravenous Given 8/24/23 1713)   morphine injection 4 mg (4 mg Intravenous Given 8/24/23 1816)   ondansetron injection 4 mg (4 mg Intravenous Given 8/24/23 1816)     Medical Decision Making  Amount and/or Complexity of Data Reviewed  Radiology: ordered.    Risk  Prescription drug management.                          Medical Decision Making:   History:   Old Medical Records: I decided to obtain old medical records.  Old Records Summarized: other records and records from another hospital.  Initial Assessment:    3:45PM:  Patient is a 28 old female who presents to the emerge department with fever, myalgias, hematuria with urinary urgency.  Patient is febrile here and tachycardic.  She is otherwise nontoxic appearing.  Will plan for labs, cultures, imaging, will continue to follow and reassess.  Differential Diagnosis:   Pyelonephritis, urinary tract infection, sepsis, COVID, nephrolithiasis,  Independently Interpreted Test(s):   I have ordered and independently interpreted X-rays - see prior notes.  I have ordered and independently interpreted EKG Reading(s) - see prior notes  Clinical Tests:   Lab Tests: Ordered and Reviewed  Radiological Study: Ordered and Reviewed  Medical Tests: Ordered and Reviewed    6:33 PM:  Patient doing well, she is feeling much better.  Labs are suggestive of a urinary tract infection with 3+ blood and 2+ leukocytes.  A urine culture is pending.  Her labs are otherwise unremarkable and her CT is negative for any acute  findings.  Her vitals have improved and she is no longer febrile.  Will plan to provide a prescription for antibiotics along with pain medication to take as needed.  I do not feel that further work up in the ED is indicated at this time.  I updated pt regarding results and I counseled pt regarding supportive care measures.  I have discussed with the pt ED return warnings and need for close PCP f/u.  Pt agreeable to plan and all questions answered.  I feel that pt is stable for discharge and management as an outpatient and no further intervention is needed at this time.  Pt is comfortable returning to the ED if needed.  Will DC home in stable condition.        Clinical Impression:   Final diagnoses:  [R00.0] Tachycardia  [N12] Pyelonephritis (Primary)        ED Disposition Condition    Discharge Stable          ED Prescriptions       Medication Sig Dispense Start Date End Date Auth. Provider    sulfamethoxazole-trimethoprim 800-160mg (BACTRIM DS) 800-160 mg Tab Take 1 tablet by mouth 2 (two) times daily. 20 tablet 8/24/2023 -- Kallie Gonzalez MD    HYDROcodone-acetaminophen (NORCO) 5-325 mg per tablet Take 1 tablet by mouth every 4 (four) hours as needed for Pain. 12 tablet 8/24/2023 -- Kallie Gonzalez MD          Follow-up Information       Follow up With Specialties Details Why Contact Info    Esteban Powell MD Pediatrics   [address] [facility] [address]  [phone]               Kallie Gonzalez MD  08/24/23 1944       Kallie Gonzalez MD  08/24/23 2012

## 2023-08-24 NOTE — ED TRIAGE NOTES
Dental pain with facial swelling that began today - known cracked tooth. Also c/o urinary frequency and dysuria - states urine has been cloudy for several days, hematuria noted today. Also recently found out that sexual partner has had multiple partners recently so she wants to be checked for STD. C/o generalized body aches, endorses a lot of personal stress. Presents in no distress.

## 2023-08-24 NOTE — DISCHARGE INSTRUCTIONS
We have prescribed you antibiotics. Please fill and take as directed. It is important that you completely finish the antibiotics.      We have prescribed you pain medication. Please fill and take as directed. Do not drink alcohol or drive while taking this medication.  Do not take any additional tylenol while taking this medication.  It is important to take stool softeners and/or eat plenty of fiber to prevent constipation while taking this medication.      Please return to the ER if you have chest pain, difficulty breathing, fevers, altered mental status, dizziness, weakness, or any other concerns.      Follow up with your primary care physician.

## 2023-08-28 LAB — BACTERIA UR CULT: ABNORMAL

## 2023-08-29 LAB
BACTERIA BLD CULT: NORMAL
BACTERIA BLD CULT: NORMAL

## 2024-04-09 ENCOUNTER — HOSPITAL ENCOUNTER (EMERGENCY)
Facility: OTHER | Age: 29
Discharge: HOME | End: 2024-04-09
Attending: EMERGENCY MEDICINE
Payer: MEDICAID

## 2024-04-09 VITALS
WEIGHT: 158 LBS | OXYGEN SATURATION: 99 % | TEMPERATURE: 98 F | RESPIRATION RATE: 17 BRPM | HEART RATE: 67 BPM | DIASTOLIC BLOOD PRESSURE: 55 MMHG | SYSTOLIC BLOOD PRESSURE: 111 MMHG | HEIGHT: 65 IN | BODY MASS INDEX: 26.33 KG/M2

## 2024-04-09 DIAGNOSIS — D57.00 SICKLE CELL PAIN CRISIS: Primary | ICD-10-CM

## 2024-04-09 DIAGNOSIS — R07.9 CHEST PAIN: ICD-10-CM

## 2024-04-09 LAB
ALBUMIN SERPL BCP-MCNC: 4.4 G/DL (ref 3.5–5.2)
ALP SERPL-CCNC: 78 U/L (ref 55–135)
ALT SERPL W/O P-5'-P-CCNC: 14 U/L (ref 10–44)
ANION GAP SERPL CALC-SCNC: 11 MMOL/L (ref 8–16)
AST SERPL-CCNC: 27 U/L (ref 10–40)
B-HCG UR QL: NEGATIVE
BASOPHILS # BLD AUTO: 0.02 K/UL (ref 0–0.2)
BASOPHILS NFR BLD: 0.2 % (ref 0–1.9)
BILIRUB SERPL-MCNC: 2 MG/DL (ref 0.1–1)
BUN SERPL-MCNC: 8 MG/DL (ref 6–20)
CALCIUM SERPL-MCNC: 9.7 MG/DL (ref 8.7–10.5)
CHLORIDE SERPL-SCNC: 104 MMOL/L (ref 95–110)
CO2 SERPL-SCNC: 22 MMOL/L (ref 23–29)
CREAT SERPL-MCNC: 0.9 MG/DL (ref 0.5–1.4)
CTP QC/QA: YES
DIFFERENTIAL METHOD BLD: ABNORMAL
EOSINOPHIL # BLD AUTO: 0 K/UL (ref 0–0.5)
EOSINOPHIL NFR BLD: 0.3 % (ref 0–8)
ERYTHROCYTE [DISTWIDTH] IN BLOOD BY AUTOMATED COUNT: 16.3 % (ref 11.5–14.5)
EST. GFR  (NO RACE VARIABLE): >60 ML/MIN/1.73 M^2
GLUCOSE SERPL-MCNC: 91 MG/DL (ref 70–110)
HCT VFR BLD AUTO: 27.1 % (ref 37–48.5)
HGB BLD-MCNC: 9.5 G/DL (ref 12–16)
IMM GRANULOCYTES # BLD AUTO: 0.06 K/UL (ref 0–0.04)
IMM GRANULOCYTES NFR BLD AUTO: 0.7 % (ref 0–0.5)
LYMPHOCYTES # BLD AUTO: 0.9 K/UL (ref 1–4.8)
LYMPHOCYTES NFR BLD: 9.4 % (ref 18–48)
MCH RBC QN AUTO: 27.4 PG (ref 27–31)
MCHC RBC AUTO-ENTMCNC: 35.1 G/DL (ref 32–36)
MCV RBC AUTO: 78 FL (ref 82–98)
MONOCYTES # BLD AUTO: 0.5 K/UL (ref 0.3–1)
MONOCYTES NFR BLD: 5 % (ref 4–15)
NEUTROPHILS # BLD AUTO: 7.6 K/UL (ref 1.8–7.7)
NEUTROPHILS NFR BLD: 84.4 % (ref 38–73)
NRBC BLD-RTO: 0 /100 WBC
PLATELET # BLD AUTO: 114 K/UL (ref 150–450)
PMV BLD AUTO: ABNORMAL FL (ref 9.2–12.9)
POC MOLECULAR INFLUENZA A AGN: NEGATIVE
POC MOLECULAR INFLUENZA B AGN: NEGATIVE
POTASSIUM SERPL-SCNC: 3.7 MMOL/L (ref 3.5–5.1)
PROT SERPL-MCNC: 8.5 G/DL (ref 6–8.4)
RBC # BLD AUTO: 3.47 M/UL (ref 4–5.4)
RETICS/RBC NFR AUTO: 4 % (ref 0.5–2.5)
SARS-COV-2 RDRP RESP QL NAA+PROBE: NEGATIVE
SODIUM SERPL-SCNC: 137 MMOL/L (ref 136–145)
WBC # BLD AUTO: 9 K/UL (ref 3.9–12.7)

## 2024-04-09 PROCEDURE — 85045 AUTOMATED RETICULOCYTE COUNT: CPT | Performed by: EMERGENCY MEDICINE

## 2024-04-09 PROCEDURE — 96365 THER/PROPH/DIAG IV INF INIT: CPT

## 2024-04-09 PROCEDURE — 96376 TX/PRO/DX INJ SAME DRUG ADON: CPT

## 2024-04-09 PROCEDURE — 85025 COMPLETE CBC W/AUTO DIFF WBC: CPT | Performed by: EMERGENCY MEDICINE

## 2024-04-09 PROCEDURE — 93005 ELECTROCARDIOGRAM TRACING: CPT

## 2024-04-09 PROCEDURE — 25000003 PHARM REV CODE 250: Performed by: EMERGENCY MEDICINE

## 2024-04-09 PROCEDURE — 87635 SARS-COV-2 COVID-19 AMP PRB: CPT | Performed by: EMERGENCY MEDICINE

## 2024-04-09 PROCEDURE — 63600175 PHARM REV CODE 636 W HCPCS: Performed by: EMERGENCY MEDICINE

## 2024-04-09 PROCEDURE — 93010 ELECTROCARDIOGRAM REPORT: CPT | Mod: ,,, | Performed by: INTERNAL MEDICINE

## 2024-04-09 PROCEDURE — 81025 URINE PREGNANCY TEST: CPT | Performed by: EMERGENCY MEDICINE

## 2024-04-09 PROCEDURE — 96361 HYDRATE IV INFUSION ADD-ON: CPT

## 2024-04-09 PROCEDURE — 96375 TX/PRO/DX INJ NEW DRUG ADDON: CPT

## 2024-04-09 PROCEDURE — 87502 INFLUENZA DNA AMP PROBE: CPT

## 2024-04-09 PROCEDURE — 80053 COMPREHEN METABOLIC PANEL: CPT | Performed by: EMERGENCY MEDICINE

## 2024-04-09 PROCEDURE — 99285 EMERGENCY DEPT VISIT HI MDM: CPT | Mod: 25

## 2024-04-09 RX ORDER — KETOROLAC TROMETHAMINE 10 MG/1
10 TABLET, FILM COATED ORAL 3 TIMES DAILY PRN
Qty: 20 TABLET | Refills: 0 | Status: SHIPPED | OUTPATIENT
Start: 2024-04-09

## 2024-04-09 RX ORDER — HYDROCODONE BITARTRATE AND ACETAMINOPHEN 5; 325 MG/1; MG/1
1-2 TABLET ORAL EVERY 6 HOURS PRN
Qty: 14 TABLET | Refills: 0 | Status: SHIPPED | OUTPATIENT
Start: 2024-04-09 | End: 2024-04-19

## 2024-04-09 RX ORDER — ONDANSETRON HYDROCHLORIDE 2 MG/ML
4 INJECTION, SOLUTION INTRAVENOUS
Status: COMPLETED | OUTPATIENT
Start: 2024-04-09 | End: 2024-04-09

## 2024-04-09 RX ORDER — KETOROLAC TROMETHAMINE 30 MG/ML
10 INJECTION, SOLUTION INTRAMUSCULAR; INTRAVENOUS
Status: COMPLETED | OUTPATIENT
Start: 2024-04-09 | End: 2024-04-09

## 2024-04-09 RX ORDER — HYDROMORPHONE HYDROCHLORIDE 1 MG/ML
1 INJECTION, SOLUTION INTRAMUSCULAR; INTRAVENOUS; SUBCUTANEOUS
Status: COMPLETED | OUTPATIENT
Start: 2024-04-09 | End: 2024-04-09

## 2024-04-09 RX ORDER — ONDANSETRON 4 MG/1
4 TABLET, ORALLY DISINTEGRATING ORAL EVERY 6 HOURS PRN
Qty: 15 TABLET | Refills: 0 | Status: SHIPPED | OUTPATIENT
Start: 2024-04-09

## 2024-04-09 RX ADMIN — SODIUM CHLORIDE 1000 ML: 9 INJECTION, SOLUTION INTRAVENOUS at 09:04

## 2024-04-09 RX ADMIN — PROMETHAZINE HYDROCHLORIDE 12.5 MG: 25 INJECTION INTRAMUSCULAR; INTRAVENOUS at 12:04

## 2024-04-09 RX ADMIN — HYDROMORPHONE HYDROCHLORIDE 1 MG: 1 INJECTION, SOLUTION INTRAMUSCULAR; INTRAVENOUS; SUBCUTANEOUS at 01:04

## 2024-04-09 RX ADMIN — HYDROMORPHONE HYDROCHLORIDE 1 MG: 1 INJECTION, SOLUTION INTRAMUSCULAR; INTRAVENOUS; SUBCUTANEOUS at 09:04

## 2024-04-09 RX ADMIN — ONDANSETRON 4 MG: 2 INJECTION INTRAMUSCULAR; INTRAVENOUS at 09:04

## 2024-04-09 RX ADMIN — HYDROMORPHONE HYDROCHLORIDE 1 MG: 1 INJECTION, SOLUTION INTRAMUSCULAR; INTRAVENOUS; SUBCUTANEOUS at 11:04

## 2024-04-09 RX ADMIN — KETOROLAC TROMETHAMINE 10 MG: 30 INJECTION, SOLUTION INTRAMUSCULAR; INTRAVENOUS at 11:04

## 2024-04-09 NOTE — ED TRIAGE NOTES
"Murali CHOI Emelina, an 28 y.o. female presents to the ED with shoulder, neck, and head pain. She complains of muscle twitching. She states, "waist up everything feels like pain but my head is the worst". She is having head pressure and vision changes. She says she is having blurred vision more than normal. Complaining of sleep sweats last night. The pain began on Sunday evening around 6 pm she started feeling tingles around her neck and hands. She complains of feeling like she can not lift her head because she is heavy. Complains of vomiting today. Complains of smelling and tasting blood when she lays down. Complains of shortness of breath, stuffy nose, and congestion. She states that her toes and feet tingle.      Chief Complaint   Patient presents with    Sickle Cell Pain Crisis     Pt states that she started to have head and neck pain yesterday hx of sickle cell, out of home medication.     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

## 2024-04-09 NOTE — ED PROVIDER NOTES
Chief complaint:  Sickle Cell Pain Crisis (Pt states that she started to have head and neck pain yesterday hx of sickle cell, out of home medication.)      Source of information:  Patient, old chart    HPI:  Haydeesharimaricel Laureano Tarango is a 28 y.o. female presenting with complaint of pain for the past 3 days getting progressively worse.  Started as neck pain, now is experiencing headache chest pain pain in her extremities.  This is similar to prior sickle cell crises.  The patient is followed by hematologist in [place], but has been doing well recently had not seen the her hematologist and has run out of her medications about a month ago, has been taking none of her controlling medications and also did not have pain medications.  She has had subjective fevers, sweats, but no cough.  She did have nausea and vomiting several times yesterday and today but no diarrhea.  No difficulty urinating.  No other acute complaints    ROS: As per HPI    Review of patient's allergies indicates:  No Known Allergies    No current facility-administered medications on file prior to encounter.     Current Outpatient Medications on File Prior to Encounter   Medication Sig Dispense Refill    acetaminophen (TYLENOL) 325 MG tablet Take 2 tablets (650 mg total) by mouth every 6 (six) hours as needed for Pain or Temperature greater than (100.3). 30 tablet 0    diclofenac sodium (VOLTAREN) 1 % Gel Apply 2 g topically 3 (three) times daily as needed (joint or muscle pain). Apply 2 grams to affected area 3 times daily as needed 100 g 0    docusate sodium (COLACE) 100 MG capsule Take 2 capsules (200 mg total) by mouth 2 (two) times daily. 60 capsule 2    enoxaparin (LOVENOX) 60 mg/0.6 mL Syrg Inject 0.6 mLs (60 mg total) into the skin once daily. 30 Syringe 10    ferrous sulfate (FEOSOL) 325 mg (65 mg iron) Tab tablet Take 1 tablet (325 mg total) by mouth once daily. 30 tablet 0    folic acid (FOLVITE) 1 MG tablet Take 1 tablet (1 mg total) by  mouth once daily. 30 tablet 0    glutamine 15 gram PwPk Take 1 Package by mouth 2 (two) times a day. 60 packet 0    HYDROcodone-acetaminophen (NORCO) 5-325 mg per tablet Take 1 tablet by mouth every 4 (four) hours as needed for Pain. 12 tablet 0    LIDOcaine (LIDODERM) 5 % Apply to affected area as needed for pain for 12 hours, then off for 12 hours. Discard after each use 30 patch 0    ondansetron (ZOFRAN) 4 MG tablet Take 1 tablet (4 mg total) by mouth every 6 (six) hours. 12 tablet 0    promethazine (PHENERGAN) 6.25 mg/5 mL syrup Take 20 mLs (25 mg total) by mouth every 6 (six) hours as needed for Nausea. 118 mL 2    sulfamethoxazole-trimethoprim 800-160mg (BACTRIM DS) 800-160 mg Tab Take 1 tablet by mouth 2 (two) times daily. 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 Well nourished.  Eyes: PERRL. EOM intact. no photophobia, no nystagmus  Conjunctivae - no pallor or icterus.   ENT: HEAD: Normal - atraumatic. Normal external ears. Normal nose.  No facial asymmetry. Mucous membranes - mildly dry  Neck: Neck supple. no meningismus. No cervical lymphadenopathy.  No thyromegaly.  No JVD.  Cardiovascular: Regular rate and rhythm. Normal S1 and S2. No murmur. No gallop. No rub.  2+ peripheral  pulses  Respiratory: Normal breath sounds. No rales. No rhonchi. No wheezes. No tachypnea with exertion.  GI: Soft. Nontender. Nondistended. No guarding. No rebound. Normal BS.  Musculoskeletal:  Normal weight-bearing and gait No deformities. Normal ROM x4.   Integument: No acute skin rashes. No clubbing or cyanosis  Neurologic: No gross neurological deficits.    Psychiatric: Awake, alert.  Oriented x3.  Normal speech and mentation.        Labs Reviewed   CBC W/ AUTO DIFFERENTIAL   COMPREHENSIVE METABOLIC PANEL   RETICULOCYTES   POCT URINE PREGNANCY   POCT INFLUENZA A/B MOLECULAR   SARS-COV-2 RDRP GENE       Medications   sodium chloride 0.9% bolus 1,000 mL 1,000 mL (has no administration in time range)   HYDROmorphone injection 1 mg (has no administration in time range)   ondansetron injection 4 mg (has no administration in time range)     Medical Decision Making  Differential diagnosis includes chronic pain disorder, sickle cell pain crisis, aplastic crisis    Amount and/or Complexity of Data Reviewed  External Data Reviewed: labs.  Labs: ordered. Decision-making details documented in ED Course.  ECG/medicine tests: ordered and independent interpretation performed. Decision-making details documented in ED Course.    Risk  Prescription drug management.  Parenteral controlled substances.  Decision regarding hospitalization.      Critical Care    Date/Time: 2024 3:30 PM    Performed by: Corby Cole II, MD  Authorized by: Corby Cole II, MD  Direct patient critical care time: 25 minutes  Additional history critical care time: 3 minutes  Ordering / reviewing critical care time: 10 minutes  Documentation critical care time: 10 minutes  Total critical care time (exclusive of procedural time) : 48 minutes  Critical care was necessary to treat or prevent imminent or life-threatening deterioration of the following conditions: circulatory failure.            Independently interpreted x-ray and/or EK  lead EKG shows sinus rhythm, rate of 80.  No ST or T-wave changes.  No ischemia arrhythmia or pericarditis.    MDM:    28 y.o. female with history of sickle cell disease, infrequent presentations to the emergency department presents with headache, body pain similar to prior episodes.  No fevers.  No chest pain or shortness of breath.  On exam she is uncomfortable appearing, but has stable vital signs.  IV fluid repletion antiemetics and parenteral analgesics begun.  Laboratory studies show stable anemia, hemoglobin improved from prior.  Reticulocyte count of 4%, similar to prior presentations.  Remainder of laboratory studies unremarkable.  After several rounds of pain medications, fluids the patient is feeling better to the point where she feels that she can manage her symptoms at home.  The Toradol helped significantly with the headache will give a prescription for Toradol, Zofran.  The patient has not had a refill of her narcotics some time and will be given a prescription for limited number.  Stressed the need to follow-up with Hematology, as it is inconvenient for her to travel out of town, will give follow-up information for hematology service at [facility].  Discussed return precautions    Medications   sodium chloride 0.9% bolus 1,000 mL 1,000 mL (has no administration in time range)   HYDROmorphone injection 1 mg (has no administration in time range)   ondansetron injection 4 mg (has no administration in time range)       ASSESSMENT:   No diagnosis found.         Corby Cole II, MD  04/09/24 0270

## 2024-04-09 NOTE — Clinical Note
"Murali August" Emelina was seen and treated in our emergency department on 4/9/2024.  She may return to school on 04/11/2024.      If you have any questions or concerns, please don't hesitate to call.      Estelita RN RN"

## 2024-04-10 LAB
OHS QRS DURATION: 82 MS
OHS QTC CALCULATION: 422 MS

## 2024-08-23 ENCOUNTER — HOSPITAL ENCOUNTER (EMERGENCY)
Facility: OTHER | Age: 29
Discharge: HOME | End: 2024-08-23
Attending: EMERGENCY MEDICINE
Payer: MEDICAID

## 2024-08-23 VITALS
SYSTOLIC BLOOD PRESSURE: 124 MMHG | WEIGHT: 159 LBS | TEMPERATURE: 100 F | RESPIRATION RATE: 14 BRPM | HEART RATE: 54 BPM | DIASTOLIC BLOOD PRESSURE: 63 MMHG | OXYGEN SATURATION: 99 % | HEIGHT: 65 IN | BODY MASS INDEX: 26.49 KG/M2

## 2024-08-23 DIAGNOSIS — R05.9 COUGH: ICD-10-CM

## 2024-08-23 DIAGNOSIS — D57.00 SICKLE CELL PAIN CRISIS: Primary | ICD-10-CM

## 2024-08-23 DIAGNOSIS — H66.90 OTITIS, UNSPECIFIED LATERALITY: ICD-10-CM

## 2024-08-23 DIAGNOSIS — Z76.0 MEDICATION REFILL: ICD-10-CM

## 2024-08-23 DIAGNOSIS — R50.9 FEVER, UNSPECIFIED FEVER CAUSE: ICD-10-CM

## 2024-08-23 LAB
ALBUMIN SERPL BCP-MCNC: 4.4 G/DL (ref 3.5–5.2)
ALP SERPL-CCNC: 69 U/L (ref 55–135)
ALT SERPL W/O P-5'-P-CCNC: 8 U/L (ref 10–44)
ANION GAP SERPL CALC-SCNC: 10 MMOL/L (ref 8–16)
AST SERPL-CCNC: 20 U/L (ref 10–40)
B-HCG UR QL: NEGATIVE
BASOPHILS # BLD AUTO: 0.02 K/UL (ref 0–0.2)
BASOPHILS NFR BLD: 0.2 % (ref 0–1.9)
BILIRUB SERPL-MCNC: 2 MG/DL (ref 0.1–1)
BILIRUB UR QL STRIP: NEGATIVE
BUN SERPL-MCNC: 9 MG/DL (ref 6–20)
CALCIUM SERPL-MCNC: 9.2 MG/DL (ref 8.7–10.5)
CHLORIDE SERPL-SCNC: 104 MMOL/L (ref 95–110)
CLARITY UR: CLEAR
CO2 SERPL-SCNC: 23 MMOL/L (ref 23–29)
COLOR UR: YELLOW
CREAT SERPL-MCNC: 0.8 MG/DL (ref 0.5–1.4)
CTP QC/QA: YES
DIFFERENTIAL METHOD BLD: ABNORMAL
EOSINOPHIL # BLD AUTO: 0.1 K/UL (ref 0–0.5)
EOSINOPHIL NFR BLD: 1.4 % (ref 0–8)
ERYTHROCYTE [DISTWIDTH] IN BLOOD BY AUTOMATED COUNT: 15.7 % (ref 11.5–14.5)
EST. GFR  (NO RACE VARIABLE): >60 ML/MIN/1.73 M^2
GLUCOSE SERPL-MCNC: 106 MG/DL (ref 70–110)
GLUCOSE UR QL STRIP: NEGATIVE
HCT VFR BLD AUTO: 26.3 % (ref 37–48.5)
HGB BLD-MCNC: 9.6 G/DL (ref 12–16)
HGB UR QL STRIP: ABNORMAL
IMM GRANULOCYTES # BLD AUTO: 0.03 K/UL (ref 0–0.04)
IMM GRANULOCYTES NFR BLD AUTO: 0.4 % (ref 0–0.5)
KETONES UR QL STRIP: NEGATIVE
LEUKOCYTE ESTERASE UR QL STRIP: ABNORMAL
LYMPHOCYTES # BLD AUTO: 0.9 K/UL (ref 1–4.8)
LYMPHOCYTES NFR BLD: 11.6 % (ref 18–48)
MCH RBC QN AUTO: 29.4 PG (ref 27–31)
MCHC RBC AUTO-ENTMCNC: 36.5 G/DL (ref 32–36)
MCV RBC AUTO: 80 FL (ref 82–98)
MICROSCOPIC COMMENT: NORMAL
MONOCYTES # BLD AUTO: 0.4 K/UL (ref 0.3–1)
MONOCYTES NFR BLD: 5 % (ref 4–15)
NEUTROPHILS # BLD AUTO: 6.5 K/UL (ref 1.8–7.7)
NEUTROPHILS NFR BLD: 81.4 % (ref 38–73)
NITRITE UR QL STRIP: NEGATIVE
NRBC BLD-RTO: 0 /100 WBC
PH UR STRIP: 6 [PH] (ref 5–8)
PLATELET # BLD AUTO: 102 K/UL (ref 150–450)
PMV BLD AUTO: ABNORMAL FL (ref 9.2–12.9)
POC MOLECULAR INFLUENZA A AGN: NEGATIVE
POC MOLECULAR INFLUENZA B AGN: NEGATIVE
POTASSIUM SERPL-SCNC: 3.2 MMOL/L (ref 3.5–5.1)
PROT SERPL-MCNC: 8 G/DL (ref 6–8.4)
PROT UR QL STRIP: NEGATIVE
RBC # BLD AUTO: 3.27 M/UL (ref 4–5.4)
RBC #/AREA URNS HPF: 0 /HPF (ref 0–4)
RETICS/RBC NFR AUTO: 4.9 % (ref 0.5–2.5)
SARS-COV-2 RDRP RESP QL NAA+PROBE: NEGATIVE
SODIUM SERPL-SCNC: 137 MMOL/L (ref 136–145)
SP GR UR STRIP: 1.01 (ref 1–1.03)
SQUAMOUS #/AREA URNS HPF: 0 /HPF
URN SPEC COLLECT METH UR: ABNORMAL
UROBILINOGEN UR STRIP-ACNC: ABNORMAL EU/DL
WBC # BLD AUTO: 8.01 K/UL (ref 3.9–12.7)
WBC #/AREA URNS HPF: 5 /HPF (ref 0–5)

## 2024-08-23 PROCEDURE — 96361 HYDRATE IV INFUSION ADD-ON: CPT

## 2024-08-23 PROCEDURE — 96375 TX/PRO/DX INJ NEW DRUG ADDON: CPT

## 2024-08-23 PROCEDURE — 63700000 PHARM REV CODE 250 ALT 637 W/O HCPCS: Performed by: EMERGENCY MEDICINE

## 2024-08-23 PROCEDURE — 85045 AUTOMATED RETICULOCYTE COUNT: CPT | Performed by: EMERGENCY MEDICINE

## 2024-08-23 PROCEDURE — 96376 TX/PRO/DX INJ SAME DRUG ADON: CPT

## 2024-08-23 PROCEDURE — 25000003 PHARM REV CODE 250: Performed by: INTERNAL MEDICINE

## 2024-08-23 PROCEDURE — 81025 URINE PREGNANCY TEST: CPT | Performed by: EMERGENCY MEDICINE

## 2024-08-23 PROCEDURE — 87635 SARS-COV-2 COVID-19 AMP PRB: CPT | Performed by: EMERGENCY MEDICINE

## 2024-08-23 PROCEDURE — 81000 URINALYSIS NONAUTO W/SCOPE: CPT | Performed by: EMERGENCY MEDICINE

## 2024-08-23 PROCEDURE — 99284 EMERGENCY DEPT VISIT MOD MDM: CPT | Mod: 25

## 2024-08-23 PROCEDURE — 85025 COMPLETE CBC W/AUTO DIFF WBC: CPT | Performed by: EMERGENCY MEDICINE

## 2024-08-23 PROCEDURE — 63600175 PHARM REV CODE 636 W HCPCS: Performed by: INTERNAL MEDICINE

## 2024-08-23 PROCEDURE — 96374 THER/PROPH/DIAG INJ IV PUSH: CPT

## 2024-08-23 PROCEDURE — 25000003 PHARM REV CODE 250: Performed by: EMERGENCY MEDICINE

## 2024-08-23 PROCEDURE — 63600175 PHARM REV CODE 636 W HCPCS: Performed by: EMERGENCY MEDICINE

## 2024-08-23 PROCEDURE — 80053 COMPREHEN METABOLIC PANEL: CPT | Performed by: EMERGENCY MEDICINE

## 2024-08-23 RX ORDER — HYDROMORPHONE HYDROCHLORIDE 1 MG/ML
1 INJECTION, SOLUTION INTRAMUSCULAR; INTRAVENOUS; SUBCUTANEOUS
Status: COMPLETED | OUTPATIENT
Start: 2024-08-23 | End: 2024-08-23

## 2024-08-23 RX ORDER — FOLIC ACID 1 MG/1
1 TABLET ORAL DAILY
Qty: 90 TABLET | Refills: 0 | Status: SHIPPED | OUTPATIENT
Start: 2024-08-23 | End: 2024-11-21

## 2024-08-23 RX ORDER — AMOXICILLIN AND CLAVULANATE POTASSIUM 400; 57 MG/5ML; MG/5ML
500 POWDER, FOR SUSPENSION ORAL
Status: COMPLETED | OUTPATIENT
Start: 2024-08-23 | End: 2024-08-23

## 2024-08-23 RX ORDER — HYDROXYUREA 500 MG/1
500 CAPSULE ORAL DAILY
Qty: 90 CAPSULE | Refills: 0 | Status: SHIPPED | OUTPATIENT
Start: 2024-08-23 | End: 2024-11-21

## 2024-08-23 RX ORDER — AMOXICILLIN AND CLAVULANATE POTASSIUM 400; 57 MG/5ML; MG/5ML
400 POWDER, FOR SUSPENSION ORAL 2 TIMES DAILY
Qty: 70 ML | Refills: 0 | Status: SHIPPED | OUTPATIENT
Start: 2024-08-23 | End: 2024-08-30

## 2024-08-23 RX ORDER — HYDROCODONE BITARTRATE AND ACETAMINOPHEN 7.5; 325 MG/15ML; MG/15ML
10 SOLUTION ORAL EVERY 4 HOURS PRN
Qty: 120 ML | Refills: 0 | Status: SHIPPED | OUTPATIENT
Start: 2024-08-23

## 2024-08-23 RX ORDER — AZITHROMYCIN 250 MG/1
500 TABLET, FILM COATED ORAL
Status: DISCONTINUED | OUTPATIENT
Start: 2024-08-23 | End: 2024-08-23 | Stop reason: HOSPADM

## 2024-08-23 RX ORDER — ACETAMINOPHEN 500 MG
1000 TABLET ORAL
Status: DISCONTINUED | OUTPATIENT
Start: 2024-08-23 | End: 2024-08-23

## 2024-08-23 RX ORDER — DICLOFENAC SODIUM 10 MG/G
2 GEL TOPICAL 4 TIMES DAILY
Qty: 450 G | Refills: 0 | Status: SHIPPED | OUTPATIENT
Start: 2024-08-23 | End: 2024-11-21

## 2024-08-23 RX ORDER — DIPHENHYDRAMINE HYDROCHLORIDE 50 MG/ML
12.5 INJECTION INTRAMUSCULAR; INTRAVENOUS
Status: COMPLETED | OUTPATIENT
Start: 2024-08-23 | End: 2024-08-23

## 2024-08-23 RX ORDER — BENZONATATE 100 MG/1
100 CAPSULE ORAL 3 TIMES DAILY PRN
Qty: 20 CAPSULE | Refills: 0 | Status: SHIPPED | OUTPATIENT
Start: 2024-08-23 | End: 2024-09-02

## 2024-08-23 RX ORDER — ACETAMINOPHEN 650 MG/20.3ML
650 LIQUID ORAL
Status: COMPLETED | OUTPATIENT
Start: 2024-08-23 | End: 2024-08-23

## 2024-08-23 RX ORDER — AMOXICILLIN AND CLAVULANATE POTASSIUM 500; 125 MG/1; MG/1
500 TABLET, FILM COATED ORAL
Status: DISCONTINUED | OUTPATIENT
Start: 2024-08-23 | End: 2024-08-23

## 2024-08-23 RX ORDER — ONDANSETRON HYDROCHLORIDE 2 MG/ML
4 INJECTION, SOLUTION INTRAVENOUS
Status: COMPLETED | OUTPATIENT
Start: 2024-08-23 | End: 2024-08-23

## 2024-08-23 RX ADMIN — HYDROMORPHONE HYDROCHLORIDE 1 MG: 1 INJECTION, SOLUTION INTRAMUSCULAR; INTRAVENOUS; SUBCUTANEOUS at 06:08

## 2024-08-23 RX ADMIN — DIPHENHYDRAMINE HYDROCHLORIDE 12.5 MG: 50 INJECTION, SOLUTION INTRAMUSCULAR; INTRAVENOUS at 04:08

## 2024-08-23 RX ADMIN — ONDANSETRON 4 MG: 2 INJECTION INTRAMUSCULAR; INTRAVENOUS at 04:08

## 2024-08-23 RX ADMIN — AMOXICILLIN AND CLAVULANATE POTASSIUM 500 MG: 400; 57 POWDER, FOR SUSPENSION ORAL at 07:08

## 2024-08-23 RX ADMIN — SODIUM CHLORIDE 1000 ML: 9 INJECTION, SOLUTION INTRAVENOUS at 02:08

## 2024-08-23 RX ADMIN — SODIUM CHLORIDE 500 ML: 0.9 INJECTION, SOLUTION INTRAVENOUS at 07:08

## 2024-08-23 RX ADMIN — ACETAMINOPHEN 650 MG: 650 SOLUTION ORAL at 03:08

## 2024-08-23 RX ADMIN — HYDROMORPHONE HYDROCHLORIDE 1 MG: 1 INJECTION, SOLUTION INTRAMUSCULAR; INTRAVENOUS; SUBCUTANEOUS at 04:08

## 2024-08-23 NOTE — FIRST PROVIDER EVALUATION
Medical screening examination initiated.  I have conducted a focused provider triage encounter, findings are as follows:    Brief history of present illness:  28 year old female with history of sickle cell with infrequent ED visits presents with generalized body aches as well as URI s/s. Has taken Dayquil for the latter. Pain is consistent with prior crises.     Vitals:    08/23/24 0345 08/23/24 0402 08/23/24 0403 08/23/24 0408   BP:  114/60     Pulse:  81  82   Resp: 18  16    Temp:       TempSrc:       SpO2:  97%  96%   Weight:       Height:           Pertinent physical exam:  well appearing and non-toxic female who is resting comfortable in bed and in no acute respiratory distress. Conversant and answering questions appropriately.  Spitting up.     Brief workup plan:  Patient arrives afebrile and hemodynamically stable. Will order Tylenol for pain and continue to monitor. Further pain medication as warranted pending further exam.  Will get basic labs as well as reticulocytes and urinalysis. COVID and flu swabs given complaint of URI s/s.  An IV was established given a dose of Dilaudid Benadryl and Zofran.     Preliminary workup initiated; this workup will be continued and followed by the physician or advanced practice provider that is assigned to the patient when roome

## 2024-08-23 NOTE — FIRST PROVIDER EVALUATION
Medical screening examination initiated.  I have conducted a focused provider triage encounter, findings are as follows:    Brief history of present illness:  28 year old female with history of sickle cell with infrequent ED visits presents with generalized body aches as well as URI s/s. Has taken Dayquil for the latter. Pain is consistent with prior crises.     Vitals:    08/23/24 0345 08/23/24 0402 08/23/24 0403 08/23/24 0408   BP:  114/60     Pulse:  81  82   Resp: 18  16    Temp:       TempSrc:       SpO2:  97%  96%   Weight:       Height:           Pertinent physical exam:  well appearing and non-toxic female who is resting comfortable in bed and in no acute respiratory distress. Conversant and answering questions appropriately.     Brief workup plan:  Will order Tylenol for pain and continue to monitor. Further pain medication as warranted pending further exam.     Preliminary workup initiated; this workup will be continued and followed by the physician or advanced practice provider that is assigned to the patient when roomed.

## 2024-08-23 NOTE — ED PROVIDER NOTES
Encounter Date: 2024       History     Chief Complaint   Patient presents with    Generalized Body Aches     General body aches, SOB, subjective fever, chills, productive cough, decreased appetite, nausea x2 days. Pt states pain 10/10. Denies other GI/Gu. Took Dayquil.      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           Past Surgical History:   Procedure Laterality Date     SECTION  2014     delivery x 2     SECTION N/A 7/10/2020    Procedure:  SECTION;  Surgeon: Zoila Donaldson MD;  [facility]: [facility];  Service: OB/GYN;  Laterality: N/A;    EYE SURGERY      left eye     Family History   Problem Relation Name Age of Onset    Breast cancer Neg Hx      Colon cancer Neg Hx      Ovarian cancer Neg Hx      Cancer Neg Hx      Diabetes Neg Hx      Eclampsia Neg Hx      Hypertension Neg Hx      Miscarriages / Stillbirths Neg Hx       labor Neg Hx      Stroke Neg Hx       Social History     Tobacco Use    Smoking status: Some Days     Types: Cigarettes    Smokeless tobacco: Never   Substance Use Topics    Alcohol use: No    Drug use: No     Review of Systems    Physical Exam     Initial Vitals [24 0031]   BP Pulse Resp Temp SpO2   135/73 83 18 99.6 °F (37.6 °C) 100 %      MAP       --         Physical Exam    ED Course   Procedures  Labs Reviewed   CBC W/ AUTO DIFFERENTIAL - Abnormal       Result Value    WBC 8.01      RBC 3.27 (*)     Hemoglobin 9.6 (*)     Hematocrit 26.3 (*)     MCV 80 (*)     MCH 29.4      MCHC 36.5 (*)     RDW 15.7 (*)     Platelets 102 (*)     MPV SEE COMMENT      Immature Granulocytes 0.4      Gran # (ANC) 6.5      Immature Grans (Abs) 0.03      Lymph # 0.9 (*)     Mono # 0.4       Eos # 0.1      Baso # 0.02      nRBC 0      Gran % 81.4 (*)     Lymph % 11.6 (*)     Mono % 5.0      Eosinophil % 1.4      Basophil % 0.2      Differential Method Automated     COMPREHENSIVE METABOLIC PANEL - Abnormal    Sodium 137      Potassium 3.2 (*)     Chloride 104      CO2 23      Glucose 106      BUN 9      Creatinine 0.8      Calcium 9.2      Total Protein 8.0      Albumin 4.4      Total Bilirubin 2.0 (*)     Alkaline Phosphatase 69      AST 20      ALT 8 (*)     eGFR >60      Anion Gap 10     URINALYSIS, REFLEX TO URINE CULTURE - Abnormal    Specimen UA Urine, Clean Catch      Color, UA Yellow      Appearance, UA Clear      pH, UA 6.0      Specific Gravity, UA 1.010      Protein, UA Negative      Glucose, UA Negative      Ketones, UA Negative      Bilirubin (UA) Negative      Occult Blood UA Trace (*)     Nitrite, UA Negative      Urobilinogen, UA 4.0-6.0 (*)     Leukocytes, UA Trace (*)     Narrative:     Specimen Source->Urine   RETICULOCYTES - Abnormal    Retic 4.9 (*)    URINALYSIS MICROSCOPIC    RBC, UA 0      WBC, UA 5      Squam Epithel, UA 0      Microscopic Comment SEE COMMENT      Narrative:     Specimen Source->Urine   POCT INFLUENZA A/B MOLECULAR    POC Molecular Influenza A Ag Negative      POC Molecular Influenza B Ag Negative       Acceptable Yes     SARS-COV-2 RDRP GENE    POC Rapid COVID Negative       Acceptable Yes     POCT URINE PREGNANCY    POC Preg Test, Ur Negative       Acceptable Yes            Imaging Results    None          Medications   acetaminophen tablet 1,000 mg (has no administration in time range)   sodium chloride 0.9% bolus 1,000 mL 1,000 mL (1,000 mLs Intravenous New Bag 8/23/24 0200)     Medical Decision Making             ED Course as of 08/23/24 0306   Fri Aug 23, 2024   0306 Spoke with the patient, who is comfortably resting in bed and in no acute respiratory distress. Informed her that I will see her as soon as possible  "and ordered Tylenol for pain. Will continue to monitor.  [GS]      ED Course User Index  [GS] Kourtney Lawrence                           Clinical Impression:   ***Please document a Clinical Impression and click the "Refresh" button to refresh your note and automatically pull in before signing.***           "

## 2024-08-23 NOTE — ED PROVIDER NOTES
Encounter Date: 2024       History     Chief Complaint   Patient presents with    Generalized Body Aches     General body aches, SOB, subjective fever, chills, productive cough, decreased appetite, nausea x2 days. Pt states pain 10/10. Denies other GI/Gu. Took Dayquil.      This is a very pleasant 29-year-old female presenting for evaluation of diffuse body aches and pain which is similar to previous episodes of sickle cell pain crisis she has suffered in the past.  Has had an issue obtaining regular follow-up for her sickle cell anemia in his result has been off of her regular medical regimen for some months.  She denies any recent fevers, chills, does have minor congestion which is a chronic issue for her.    The history is provided by the patient and medical records.     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           Past Surgical History:   Procedure Laterality Date     SECTION  2014     delivery x 2     SECTION N/A 7/10/2020    Procedure:  SECTION;  Surgeon: Zoila Donaldson MD;  Location: [facility]&D;  Service: OB/GYN;  Laterality: N/A;    EYE SURGERY      left eye     Family History   Problem Relation Name Age of Onset    Breast cancer Neg Hx      Colon cancer Neg Hx      Ovarian cancer Neg Hx      Cancer Neg Hx      Diabetes Neg Hx      Eclampsia Neg Hx      Hypertension Neg Hx      Miscarriages / Stillbirths Neg Hx       labor Neg Hx      Stroke Neg Hx       Social History     Tobacco Use    Smoking status: Some Days     Types: Cigarettes    Smokeless tobacco: Never   Substance Use Topics    Alcohol use: No    Drug use: No     Review of Systems  Constitutional-no fever  HEENT-no congestion  Eyes-no  redness  Respiratory-no shortness of breath positive cough  Cardio-no chest pain  GI-no abdominal pain  Endocrine-no cold intolerance  -no difficulty urinating  MSK-positive myalgias  Skin-no rashes  Allergy-no environmental allergy  Neurologic-, no headache  Hematology-no swollen nodes  Behavioral-no confusion  Physical Exam     Initial Vitals [08/23/24 0031]   BP Pulse Resp Temp SpO2   135/73 83 18 99.6 °F (37.6 °C) 100 %      MAP       --         Physical Exam  Constitutional:  29-year-old female in moderate distress  Eyes: Conjunctivae normal.  ENT       Head: Normocephalic, atraumatic.       Nose: Normal external appearance        Mouth/Throat: no strigulous respirations   Hematological/Lymphatic/Immunilogical: no visible lymphadenopathy   Cardiovascular: Normal rate,   Respiratory: Normal respiratory effort, scattered rhonchi most prominent in the right middle lung field   Gastrointestinal: non distended   Musculoskeletal: Normal range of motion in all extremities. No obvious deformities or swelling.  Neurologic: Alert, oriented. Normal speech and language. No gross focal neurologic deficits are appreciated.  Skin: Skin is warm, dry. No rash noted.  Psychiatric: Mood and affect are normal.   ED Course   Procedures  Labs Reviewed   CBC W/ AUTO DIFFERENTIAL - Abnormal       Result Value    WBC 8.01      RBC 3.27 (*)     Hemoglobin 9.6 (*)     Hematocrit 26.3 (*)     MCV 80 (*)     MCH 29.4      MCHC 36.5 (*)     RDW 15.7 (*)     Platelets 102 (*)     MPV SEE COMMENT      Immature Granulocytes 0.4      Gran # (ANC) 6.5      Immature Grans (Abs) 0.03      Lymph # 0.9 (*)     Mono # 0.4      Eos # 0.1      Baso # 0.02      nRBC 0      Gran % 81.4 (*)     Lymph % 11.6 (*)     Mono % 5.0      Eosinophil % 1.4      Basophil % 0.2      Differential Method Automated     COMPREHENSIVE METABOLIC PANEL - Abnormal    Sodium 137      Potassium 3.2 (*)     Chloride 104      CO2 23      Glucose 106      BUN 9      Creatinine  0.8      Calcium 9.2      Total Protein 8.0      Albumin 4.4      Total Bilirubin 2.0 (*)     Alkaline Phosphatase 69      AST 20      ALT 8 (*)     eGFR >60      Anion Gap 10     URINALYSIS, REFLEX TO URINE CULTURE - Abnormal    Specimen UA Urine, Clean Catch      Color, UA Yellow      Appearance, UA Clear      pH, UA 6.0      Specific Gravity, UA 1.010      Protein, UA Negative      Glucose, UA Negative      Ketones, UA Negative      Bilirubin (UA) Negative      Occult Blood UA Trace (*)     Nitrite, UA Negative      Urobilinogen, UA 4.0-6.0 (*)     Leukocytes, UA Trace (*)     Narrative:     Specimen Source->Urine   RETICULOCYTES - Abnormal    Retic 4.9 (*)    URINALYSIS MICROSCOPIC    RBC, UA 0      WBC, UA 5      Squam Epithel, UA 0      Microscopic Comment SEE COMMENT      Narrative:     Specimen Source->Urine   POCT INFLUENZA A/B MOLECULAR    POC Molecular Influenza A Ag Negative      POC Molecular Influenza B Ag Negative       Acceptable Yes     SARS-COV-2 RDRP GENE    POC Rapid COVID Negative       Acceptable Yes     POCT URINE PREGNANCY    POC Preg Test, Ur Negative       Acceptable Yes            Imaging Results              X-Ray Chest AP Portable (Final result)  Result time 08/23/24 06:37:16      Final result by Marquise Rand MD (08/23/24 06:37:16)                   Impression:      Since prior examination of 08/24/2023, 15:49 hours, question development of vague hazy/ground-glass type opacities at the lung bases.  Differential considerations would include atelectasis, edema, infection, and/or noninfectious inflammatory process, in the appropriate clinical context.      Electronically signed by: Marquise Rand  Date:    08/23/2024  Time:    06:37               Narrative:    EXAMINATION:  XR CHEST AP PORTABLE    CLINICAL HISTORY:  Cough, unspecified    TECHNIQUE:  Single frontal view of the chest was performed.    COMPARISON:  Chest radiograph performed  08/24/2023, 15:49 hours.    FINDINGS:  Cardiomediastinal contours grossly unchanged.    Since 08/24/2023 15:49 hours examination, question development of vague hazy/ground-glass type opacities at the lung bases.    No definite dense airspace consolidation seen.  No evidence of pneumothorax or large volume pleural effusion.    No acute findings in the visualized abdomen.    Osseous and soft tissue structures appear without definite acute change.                                    X-Rays:   Independently Interpreted Readings:   Other Readings:  Chest x-ray-increased markings in the lower lung fields bilaterally    Medications   sodium chloride 0.9% bolus 1,000 mL 1,000 mL (0 mLs Intravenous Stopped 8/23/24 0300)   acetaminophen oral solution 650 mg (650 mg Oral Given 8/23/24 0318)   ondansetron injection 4 mg (4 mg Intravenous Given 8/23/24 0400)   diphenhydrAMINE injection 12.5 mg (12.5 mg Intravenous Given 8/23/24 0402)   HYDROmorphone injection 1 mg (1 mg Intravenous Given 8/23/24 0403)   sodium chloride 0.9% bolus 500 mL 500 mL (500 mLs Intravenous New Bag 8/23/24 0749)   HYDROmorphone injection 1 mg (1 mg Intravenous Given 8/23/24 0627)   amoxicillin-clavulanate 400-57 mg/5 mL suspension 500 mg (500 mg Oral Given 8/23/24 0752)     Medical Decision Making  Differential diagnosis-flu, COVID, bronchitis, pneumonia, sickle cell pain crisis, bacteremia, chronic pain syndrome    This 29-year-old with poorly controlled pain and chronic sickle cell anemia.    Analgesics ordered, x-ray obtained.    Element of otitis clear on ENT examination.    Multiple doses of analgesics administered, pain improved, requests discharge at this time, seems appropriate for this patient.  Will plan for antibiotic coverage for potential pulmonary infection at this time have encouraged return to the emergency department case any worsening of symptoms this to occur.    Problems Addressed:  Cough: acute illness or injury  Fever, unspecified  fever cause: acute illness or injury  Medication refill: chronic illness or injury  Otitis, unspecified laterality: acute illness or injury  Sickle cell pain crisis: chronic illness or injury with exacerbation, progression, or side effects of treatment    Amount and/or Complexity of Data Reviewed  External Data Reviewed: labs, radiology, ECG and notes.     Details: History of sickle cell anemia chronic pain syndrome  Labs: ordered. Decision-making details documented in ED Course.  Radiology: ordered and independent interpretation performed. Decision-making details documented in ED Course.    Risk  OTC drugs.  Prescription drug management.  Parenteral controlled substances.  Decision regarding hospitalization.  Diagnosis or treatment significantly limited by social determinants of health.  Risk Details: Chronic disease burden complicates this patient's care with the social determinants of health               ED Course as of 08/25/24 0734   Fri Aug 23, 2024   0306 Spoke with the patient, who is comfortably resting in bed and in no acute respiratory distress. Informed her that I will see her as soon as possible and ordered Tylenol for pain. Will continue to monitor.  [HM]      ED Course User Index  [HM] Liza Frank MD                           Clinical Impression:  Final diagnoses:  [R05.9] Cough  [D57.00] Sickle cell pain crisis (Primary)  [R50.9] Fever, unspecified fever cause  [H66.90] Otitis, unspecified laterality  [Z76.0] Medication refill          ED Disposition Condition    Discharge           ED Prescriptions       Medication Sig Dispense Start Date End Date Auth. Provider    folic acid (FOLVITE) 1 MG tablet Take 1 tablet (1 mg total) by mouth once daily. 90 tablet 8/23/2024 11/21/2024 Jason Weiss MD    hydroxyurea (HYDREA) 500 mg Cap Take 1 capsule (500 mg total) by mouth once daily. 90 capsule 8/23/2024 11/21/2024 Jason Weiss MD    diclofenac sodium (VOLTAREN ARTHRITIS PAIN) 1 % Gel  Apply 2 g topically 4 (four) times daily. 450 g 8/23/2024 11/21/2024 Jason Weiss MD    amoxicillin-clavulanate (AUGMENTIN) 400-57 mg/5 mL SusR Take 5 mLs (400 mg total) by mouth 2 (two) times daily. for 7 days 70 mL 8/23/2024 8/30/2024 Jason Weiss MD    hydrocodone-acetaminophen (HYCET) solution 7.5-325 mg/15mL Take 10 mLs by mouth every 4 (four) hours as needed for Pain. 120 mL 8/23/2024 -- Jason Weiss MD    benzonatate (TESSALON) 100 MG capsule Take 1 capsule (100 mg total) by mouth 3 (three) times daily as needed. 20 capsule 8/23/2024 9/2/2024 Jason Weiss MD          Follow-up Information       Follow up With Specialties Details Why Contact [facility] HEMATOLOGY/ONCOLOGY Hematology and Oncology Schedule an appointment as soon as possible for a visit  As needed, For a follow up visit about today [facility] [facility] [address]  [phone]             Jason Weiss MD  08/25/24 1470

## 2024-08-23 NOTE — ED PROVIDER NOTES
Encounter date: 3:07 AM 08/23/2024    Source of History   Patient    Chief Complaint   Pt presents with:   Generalized Body Aches (General body aches, SOB, subjective fever, chills, productive cough, decreased appetite, nausea x2 days. Pt states pain 10/10. Denies other GI/Gu. Took Dayquil. )      History Of Present Illness   Murali Tinsley STEPH Tarango is a 29 y.o. female with sickle cell disease *** who presents to the ED with ***    Denies: ***  This is the extent to the patients complaints today here in the emergency department.  Past History   Review of patient's allergies indicates:  No Known Allergies    No current facility-administered medications on file prior to encounter.     Current Outpatient Medications on File Prior to Encounter   Medication Sig Dispense Refill    acetaminophen (TYLENOL) 325 MG tablet Take 2 tablets (650 mg total) by mouth every 6 (six) hours as needed for Pain or Temperature greater than (100.3). 30 tablet 0    diclofenac sodium (VOLTAREN) 1 % Gel Apply 2 g topically 3 (three) times daily as needed (joint or muscle pain). Apply 2 grams to affected area 3 times daily as needed 100 g 0    docusate sodium (COLACE) 100 MG capsule Take 2 capsules (200 mg total) by mouth 2 (two) times daily. 60 capsule 2    enoxaparin (LOVENOX) 60 mg/0.6 mL Syrg Inject 0.6 mLs (60 mg total) into the skin once daily. 30 Syringe 10    ferrous sulfate (FEOSOL) 325 mg (65 mg iron) Tab tablet Take 1 tablet (325 mg total) by mouth once daily. 30 tablet 0    folic acid (FOLVITE) 1 MG tablet Take 1 tablet (1 mg total) by mouth once daily. 30 tablet 0    glutamine 15 gram PwPk Take 1 Package by mouth 2 (two) times a day. 60 packet 0    ketorolac (TORADOL) 10 mg tablet Take 1 tablet (10 mg total) by mouth 3 (three) times daily as needed for Pain. 20 tablet 0    LIDOcaine (LIDODERM) 5 % Apply to affected area as needed for pain for 12 hours, then off for 12 hours. Discard after each use 30 patch 0    ondansetron  "(ZOFRAN) 4 MG tablet Take 1 tablet (4 mg total) by mouth every 6 (six) hours. 12 tablet 0    ondansetron (ZOFRAN-ODT) 4 MG TbDL Take 1 tablet (4 mg total) by mouth every 6 (six) hours as needed. 15 tablet 0    promethazine (PHENERGAN) 6.25 mg/5 mL syrup Take 20 mLs (25 mg total) by mouth every 6 (six) hours as needed for Nausea. 118 mL 2    sulfamethoxazole-trimethoprim 800-160mg (BACTRIM DS) 800-160 mg Tab Take 1 tablet by mouth 2 (two) times daily. 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           Past Surgical History:   Procedure Laterality Date     SECTION  2014     delivery x 2     SECTION N/A 7/10/2020    Procedure:  SECTION;  Surgeon: Zoila Donaldson MD;  Location: [facility], operated by [facility] L&D;  Service: OB/GYN;  Laterality: N/A;    EYE SURGERY      left eye       Social History     Socioeconomic History    Marital status: Single   Tobacco Use    Smoking status: Some Days     Types: Cigarettes    Smokeless tobacco: Never   Substance and Sexual Activity    Alcohol use: No    Drug use: No    Sexual activity: Yes     Partners: Male     Birth control/protection: None     Comment: single       Family History   Problem Relation Name Age of Onset    Breast cancer Neg Hx      Colon cancer Neg Hx      Ovarian cancer Neg Hx      Cancer Neg Hx      Diabetes Neg Hx      Eclampsia Neg Hx      Hypertension Neg Hx      Miscarriages / Stillbirths Neg Hx       labor Neg Hx      Stroke Neg Hx         Physical Exam     Vitals:    24 0031 24 0130 24 0132 24 0202   BP: 135/73 111/71  115/74   Pulse: 83 88  78   Resp: 18      Temp: 99.6 °F (37.6 °C)      TempSrc: Oral      SpO2: 100%  96% 100%   Weight: 72.1 kg (159 lb)      Height: 5' 5" (1.651 " m)        Physical Exam:   Nursing note and vitals reviewed.  Appearance: *** female in no acute respiratory distress.  Making purposeful movements.  Speaking in full sentences.  Skin: No obvious rashes seen.  Good turgor.  No abrasions.  No ecchymoses.  Eyes: No conjunctival injection. EOMI, PERRL.  Head: NC/AT  ENT: ***  Chest: CTAB. No increased work of breathing, bilateral chest rise.  Cardiovascular: Regular rate and rhythm.  Normal equal bilateral radial pulses.  Abdomen: Soft.  Not distended.  Nontender.  No guarding.  No rebound. No Masses  Musculoskeletal: No obvious deformities.  *** Neck supple, full range of motion, no obvious deformity. ***  No tenderness to palpation of cervical through lumbar spine.  No step-offs or deformities. Good range of motion all joints.  Neurologic: Moves all extremities.  Normal sensation.  No facial droop.  Normal speech.    Mental Status:  Alert and oriented x 3.  Appropriate, conversant.      Results and Medications    Procedures    Labs Reviewed   CBC W/ AUTO DIFFERENTIAL - Abnormal       Result Value    WBC 8.01      RBC 3.27 (*)     Hemoglobin 9.6 (*)     Hematocrit 26.3 (*)     MCV 80 (*)     MCH 29.4      MCHC 36.5 (*)     RDW 15.7 (*)     Platelets 102 (*)     MPV SEE COMMENT      Immature Granulocytes 0.4      Gran # (ANC) 6.5      Immature Grans (Abs) 0.03      Lymph # 0.9 (*)     Mono # 0.4      Eos # 0.1      Baso # 0.02      nRBC 0      Gran % 81.4 (*)     Lymph % 11.6 (*)     Mono % 5.0      Eosinophil % 1.4      Basophil % 0.2      Differential Method Automated     COMPREHENSIVE METABOLIC PANEL - Abnormal    Sodium 137      Potassium 3.2 (*)     Chloride 104      CO2 23      Glucose 106      BUN 9      Creatinine 0.8      Calcium 9.2      Total Protein 8.0      Albumin 4.4      Total Bilirubin 2.0 (*)     Alkaline Phosphatase 69      AST 20      ALT 8 (*)     eGFR >60      Anion Gap 10     URINALYSIS, REFLEX TO URINE CULTURE - Abnormal    Specimen UA Urine,  Clean Catch      Color, UA Yellow      Appearance, UA Clear      pH, UA 6.0      Specific Gravity, UA 1.010      Protein, UA Negative      Glucose, UA Negative      Ketones, UA Negative      Bilirubin (UA) Negative      Occult Blood UA Trace (*)     Nitrite, UA Negative      Urobilinogen, UA 4.0-6.0 (*)     Leukocytes, UA Trace (*)     Narrative:     Specimen Source->Urine   RETICULOCYTES - Abnormal    Retic 4.9 (*)    URINALYSIS MICROSCOPIC    RBC, UA 0      WBC, UA 5      Squam Epithel, UA 0      Microscopic Comment SEE COMMENT      Narrative:     Specimen Source->Urine   POCT INFLUENZA A/B MOLECULAR    POC Molecular Influenza A Ag Negative      POC Molecular Influenza B Ag Negative       Acceptable Yes     SARS-COV-2 RDRP GENE    POC Rapid COVID Negative       Acceptable Yes     POCT URINE PREGNANCY    POC Preg Test, Ur Negative       Acceptable Yes         Imaging Results    None             Medications   acetaminophen tablet 1,000 mg (has no administration in time range)   sodium chloride 0.9% bolus 1,000 mL 1,000 mL (1,000 mLs Intravenous New Bag 8/23/24 0200)       MDM, Impression and Plan   Previous Records:   I decided to obtain old medical records which is listed here or in ED course: ***    Independently Interpreted Test(s):   EKG:  I independently reviewed and interpreted the EKG and my findings are as follows:   Detailed here or in ED course. ***  IMAGING:  I have ordered and independently interpreted X-rays and my findings are as follow:  Detailed here or in ED course. ***    Clinical Tests:   Lab Tests: Ordered and Reviewed  Radiological Study: Ordered and Reviewed  Medical Tests: Ordered and Reviewed    Differential diagnosis:  ***    Initial Assessment & ED Management:    Urgent evaluation of 29 y.o. female with ***    ***  Medical Decision Making  Risk  OTC drugs.  Prescription drug management.               I called and discussed the case with:  "***    Please see ED course for discussion of workup and dispo if not listed above.           ***Please document a Clinical Impression and click the "Refresh" button to refresh your note and automatically pull in before signing.***           ED Course as of 08/23/24 0308   Fri Aug 23, 2024   0306 Spoke with the patient, who is comfortably resting in bed and in no acute respiratory distress. Informed her that I will see her as soon as possible and ordered Tylenol for pain. Will continue to monitor.  [GS]      ED Course User Index  [GS] Kourtney Lawrence MD       "

## 2024-08-23 NOTE — DISCHARGE INSTRUCTIONS
Mrs. Tarango,    Thank you for letting me care for you today! It was nice meeting you, and I hope you feel better soon.   If you would like access to your chart and what was done today please utilize the [facility].   Please come back to [facility] for all of your future medical needs.    Our goal in the emergency department is to always give you outstanding care and exceptional service. You may receive a survey by mail or e-mail in the next week regarding your experience in our ED. We would greatly appreciate you completing and returning the survey. Your feedback provides us with a way to recognize our staff who give very good care and it helps us learn how to improve when your experience was below our aspiration of excellence.     Sincerely,    Jason Weiss MD  Board Certified Emergency Physician

## 2024-08-23 NOTE — Clinical Note
"Murali August" Emelina was seen and treated in our emergency department on 8/23/2024.  She may return to work on 08/26/2024.       If you have any questions or concerns, please don't hesitate to call.      Jason Weiss MD" 17-Jun-2019 09:54

## 2025-04-07 ENCOUNTER — HOSPITAL ENCOUNTER (EMERGENCY)
Facility: OTHER | Age: 30
Discharge: HOME | End: 2025-04-07
Attending: EMERGENCY MEDICINE
Payer: MEDICAID

## 2025-04-07 VITALS
BODY MASS INDEX: 28.66 KG/M2 | OXYGEN SATURATION: 100 % | HEIGHT: 65 IN | HEART RATE: 60 BPM | SYSTOLIC BLOOD PRESSURE: 132 MMHG | RESPIRATION RATE: 17 BRPM | TEMPERATURE: 98 F | WEIGHT: 172 LBS | DIASTOLIC BLOOD PRESSURE: 83 MMHG

## 2025-04-07 DIAGNOSIS — D57.00 SICKLE CELL DISEASE WITH CRISIS: ICD-10-CM

## 2025-04-07 DIAGNOSIS — K08.89 PAIN, DENTAL: Primary | ICD-10-CM

## 2025-04-07 LAB
B-HCG UR QL: NEGATIVE
CTP QC/QA: YES

## 2025-04-07 PROCEDURE — 25000003 PHARM REV CODE 250: Performed by: EMERGENCY MEDICINE

## 2025-04-07 PROCEDURE — 81025 URINE PREGNANCY TEST: CPT | Performed by: EMERGENCY MEDICINE

## 2025-04-07 PROCEDURE — 99284 EMERGENCY DEPT VISIT MOD MDM: CPT | Mod: 25

## 2025-04-07 PROCEDURE — 96372 THER/PROPH/DIAG INJ SC/IM: CPT | Performed by: EMERGENCY MEDICINE

## 2025-04-07 PROCEDURE — 63600175 PHARM REV CODE 636 W HCPCS: Mod: JZ,TB | Performed by: EMERGENCY MEDICINE

## 2025-04-07 RX ORDER — OXYCODONE AND ACETAMINOPHEN 10; 325 MG/1; MG/1
1 TABLET ORAL EVERY 4 HOURS PRN
Qty: 14 TABLET | Refills: 0 | Status: SHIPPED | OUTPATIENT
Start: 2025-04-07 | End: 2025-04-07

## 2025-04-07 RX ORDER — IBUPROFEN 600 MG/1
600 TABLET ORAL EVERY 6 HOURS PRN
Qty: 20 TABLET | Refills: 0 | Status: SHIPPED | OUTPATIENT
Start: 2025-04-07

## 2025-04-07 RX ORDER — IBUPROFEN 600 MG/1
600 TABLET ORAL EVERY 6 HOURS PRN
Qty: 20 TABLET | Refills: 0 | Status: SHIPPED | OUTPATIENT
Start: 2025-04-07 | End: 2025-04-07

## 2025-04-07 RX ORDER — AMOXICILLIN AND CLAVULANATE POTASSIUM 875; 125 MG/1; MG/1
1 TABLET, FILM COATED ORAL 2 TIMES DAILY
Qty: 14 TABLET | Refills: 0 | Status: SHIPPED | OUTPATIENT
Start: 2025-04-07

## 2025-04-07 RX ORDER — HYDROMORPHONE HYDROCHLORIDE 1 MG/ML
1 INJECTION, SOLUTION INTRAMUSCULAR; INTRAVENOUS; SUBCUTANEOUS
Refills: 0 | Status: COMPLETED | OUTPATIENT
Start: 2025-04-07 | End: 2025-04-07

## 2025-04-07 RX ORDER — AMOXICILLIN AND CLAVULANATE POTASSIUM 875; 125 MG/1; MG/1
1 TABLET, FILM COATED ORAL 2 TIMES DAILY
Qty: 14 TABLET | Refills: 0 | Status: SHIPPED | OUTPATIENT
Start: 2025-04-07 | End: 2025-04-07

## 2025-04-07 RX ORDER — KETOROLAC TROMETHAMINE 30 MG/ML
30 INJECTION, SOLUTION INTRAMUSCULAR; INTRAVENOUS
Status: COMPLETED | OUTPATIENT
Start: 2025-04-07 | End: 2025-04-07

## 2025-04-07 RX ORDER — AMOXICILLIN AND CLAVULANATE POTASSIUM 875; 125 MG/1; MG/1
1 TABLET, FILM COATED ORAL
Status: COMPLETED | OUTPATIENT
Start: 2025-04-07 | End: 2025-04-07

## 2025-04-07 RX ORDER — OXYCODONE AND ACETAMINOPHEN 10; 325 MG/1; MG/1
1 TABLET ORAL EVERY 4 HOURS PRN
Qty: 14 TABLET | Refills: 0 | Status: SHIPPED | OUTPATIENT
Start: 2025-04-07

## 2025-04-07 RX ADMIN — KETOROLAC TROMETHAMINE 30 MG: 30 INJECTION, SOLUTION INTRAMUSCULAR; INTRAVENOUS at 12:04

## 2025-04-07 RX ADMIN — HYDROMORPHONE HYDROCHLORIDE 1 MG: 1 INJECTION, SOLUTION INTRAMUSCULAR; INTRAVENOUS; SUBCUTANEOUS at 12:04

## 2025-04-07 RX ADMIN — AMOXICILLIN AND CLAVULANATE POTASSIUM 1 TABLET: 875; 125 TABLET, FILM COATED ORAL at 12:04

## 2025-04-07 NOTE — ED TRIAGE NOTES
Patient presents to ED complaining of left lower dental pain radiates to left neck and left ear x weeks, denies other complaints, Hx of Sickle Cell

## 2025-04-07 NOTE — FIRST PROVIDER EVALUATION
Emergency Department TeleTriage Encounter Note      CHIEF COMPLAINT    Chief Complaint   Patient presents with    Dental Pain     Left lower dental pain for the past couple of weeks that is radiating to L ear and L side of neck. Tearful in triage. PMH sickle cell.       VITAL SIGNS   Initial Vitals [04/07/25 1050]   BP Pulse Resp Temp SpO2   (!) 130/94 73 18 98.4 °F (36.9 °C) 100 %      MAP       --            ALLERGIES    Review of patient's allergies indicates:  No Known Allergies    PROVIDER TRIAGE NOTE  Patient presents with complaint of  Dental pain radiating to the ear and down the arm.  Unable to get in with dentist.      Phy:   Constitutional: well nourished, well developed, appearing stated age, NAD        Initial orders will be placed and care will be transferred to an alternate provider when patient is roomed for a full evaluation. Any additional orders and the final disposition will be determined by that provider.        ORDERS  Labs Reviewed - No data to display    ED Orders (720h ago, onward)      None              Virtual Visit Note: The provider triage portion of this emergency department evaluation and documentation was performed via [employer] ([employer]), a HIPAA-compliant telemedicine application, in concert with a tele-presenter in the room. A face to face patient evaluation with one of my colleagues will occur once the patient is placed in an emergency department room.      DISCLAIMER: This note was prepared with [employer] voice recognition transcription software. Garbled syntax, mangled pronouns, and other bizarre constructions may be attributed to that software system.

## 2025-04-07 NOTE — ED PROVIDER NOTES
Encounter Date: 2025       History     Chief Complaint   Patient presents with    Dental Pain     Left lower dental pain for the past couple of weeks that is radiating to L ear and L side of neck. Tearful in triage. [facility] sickle cell.     HPI    29-year-old female with past medical history of sickle cell anemia, stroke with ocular prosthesis of the left eye, presents with dental pain in the left lower tooth for the last couple of weeks.  Patient reports noticing in his pain around a week ago, states she attempting to get into see your dentist that she has been seen for the last 2 years over last week.  She reports they have been unable to get her in to get her tooth addressed.  She has had worsening pain, has only been taking Tylenol and Motrin at home as she has had no other pain medication to use.  She reports some radiation down into her left arm which is how her usual sickle cell pain crisis begins.  She reports working a considerable amount recently, has had to cancel work today because the pain was becoming more severe and she was unable to cut hair.  She reports previously seeing a dentist and having a tooth pulled however it was only partially fold and she was recommended to see an oral surgeon which she has been unable to over last 2 years.  She denies any fevers or chills, difficulty swallowing or talking.  No further complaints reported.    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           Past Surgical History:   Procedure Laterality Date     SECTION  2014     delivery x 2     SECTION N/A 7/10/2020    Procedure:  SECTION;  Surgeon: Zoila Donaldson MD;  Location: [facility] [place] L&D;  Service: OB/GYN;  Laterality: N/A;     EYE SURGERY      left eye     Family History   Problem Relation Name Age of Onset    Breast cancer Neg Hx      Colon cancer Neg Hx      Ovarian cancer Neg Hx      Cancer Neg Hx      Diabetes Neg Hx      Eclampsia Neg Hx      Hypertension Neg Hx      Miscarriages / Stillbirths Neg Hx       labor Neg Hx      Stroke Neg Hx       Social History[1]  Review of Systems   Constitutional: Negative.    HENT:  Positive for dental problem and ear pain.    Eyes: Negative.    Respiratory: Negative.     Cardiovascular: Negative.    Gastrointestinal: Negative.    Genitourinary: Negative.    Musculoskeletal: Negative.    Skin: Negative.    Neurological: Negative.        Physical Exam     Initial Vitals [25 1050]   BP Pulse Resp Temp SpO2   (!) 130/94 73 18 98.4 °F (36.9 °C) 100 %      MAP       --         Physical Exam    Nursing note and vitals reviewed.  Constitutional: She appears well-developed and well-nourished. She is not diaphoretic. She appears distressed (moderately).   HENT:   Head: Normocephalic and atraumatic.   Nose: Nose normal.   For dentition round, mild gingival reaction tenderness noted over #17/18, without discharge noted, no submandibular lymphadenopathy noted.   Eyes: EOM are normal. Pupils are equal, round, and reactive to light.   Neck: Neck supple. No JVD present.   Normal range of motion.  Cardiovascular:  Normal rate and regular rhythm.           Pulmonary/Chest: No stridor. No respiratory distress.   Musculoskeletal:         General: No tenderness or edema. Normal range of motion.      Cervical back: Normal range of motion and neck supple.     Lymphadenopathy:     She has no cervical adenopathy.   Neurological: She is alert and oriented to person, place, and time. She has normal strength.   Skin: Skin is warm and dry. Capillary refill takes less than 2 seconds. No rash noted. No erythema.         ED Course   Procedures  Labs Reviewed   POCT URINE PREGNANCY       Result Value    POC Preg  Test, Ur Negative       Acceptable Yes            Imaging Results    None          Medications   ketorolac injection 30 mg (has no administration in time range)   HYDROmorphone injection 1 mg (has no administration in time range)   amoxicillin-clavulanate 875-125mg per tablet 1 tablet (has no administration in time range)     Medical Decision Making  Amount and/or Complexity of Data Reviewed  Labs: ordered.    Risk  Prescription drug management.      MDM:    29-year-old female with past medical history of sickle cell anemia, stroke with ocular prosthesis of the left eye, presents with dental pain in the left lower tooth for the last couple of weeks. Differential Diagnosis includes:  Abscess, gingivitis, osteomyelitis, sickle cell pain crisis, otitis media.  Physical exam as noted above.  ED workup notable for urine preg negative.  Patient presentation appears consistent with dentalgia, some gingival reaction, will cover with Augmentin, referred to [facility] as well as [facility] at [place] which she has previously seen.  Do not suspect any additional surgical or medical emergency. Discussed diagnosis and further treatment with patient, including f/u.  Return precautions given and all questions answered.  Patient in understanding of plan.  Pt discharged to home improved and stable.        Note was created using voice recognition software. Note may have occasional typographical or grammatical errors, garbled syntax, and other bizarre constructions that may not have been identified and edited despite good andreia initial review prior to signing.                                         Clinical Impression:  Final diagnoses:  [K08.89] Pain, dental (Primary)  [D57.00] Sickle cell disease with crisis          ED Disposition Condition    Discharge Stable          ED Prescriptions       Medication Sig Dispense Start Date End Date Auth. Provider    oxyCODONE-acetaminophen (PERCOCET)  mg per  tablet Take 1 tablet by mouth every 4 (four) hours as needed for Pain. 14 tablet 4/7/2025 -- Ángel Mills MD    amoxicillin-clavulanate 875-125mg (AUGMENTIN) 875-125 mg per tablet Take 1 tablet by mouth 2 (two) times daily. 14 tablet 4/7/2025 -- Ángel Mills MD    ibuprofen (ADVIL,MOTRIN) 600 MG tablet Take 1 tablet (600 mg total) by mouth every 6 (six) hours as needed for Pain. 20 tablet 4/7/2025 -- Ángel Mills MD          Follow-up Information       Follow up With Specialties Details Why Contact Info    [facility] - Emergency Dept Emergency Medicine Go to  If symptoms worsen [address] [facility] [id number] [phone]    [facility], Oral Surgery Schedule an appointment as soon as possible for a visit   [address] [place] [address]  [phone]                   [1]   Social History  Tobacco Use    Smoking status: Some Days     Types: Cigarettes    Smokeless tobacco: Never   Substance Use Topics    Alcohol use: No    Drug use: No        Ángel Mills MD  04/07/25 1235

## 2025-06-13 ENCOUNTER — HOSPITAL ENCOUNTER (EMERGENCY)
Facility: OTHER | Age: 30
Discharge: HOME | End: 2025-06-13
Attending: EMERGENCY MEDICINE
Payer: MEDICAID

## 2025-06-13 ENCOUNTER — ON-DEMAND VIRTUAL (OUTPATIENT)
Dept: URGENT CARE | Facility: CLINIC | Age: 30
End: 2025-06-13
Payer: MEDICAID

## 2025-06-13 VITALS
TEMPERATURE: 98 F | HEART RATE: 52 BPM | SYSTOLIC BLOOD PRESSURE: 107 MMHG | DIASTOLIC BLOOD PRESSURE: 55 MMHG | HEIGHT: 65 IN | BODY MASS INDEX: 29.16 KG/M2 | RESPIRATION RATE: 18 BRPM | WEIGHT: 175 LBS | OXYGEN SATURATION: 97 %

## 2025-06-13 DIAGNOSIS — D57.00 SICKLE CELL PAIN CRISIS: Primary | ICD-10-CM

## 2025-06-13 DIAGNOSIS — M79.605 LEFT LEG PAIN: ICD-10-CM

## 2025-06-13 LAB
ABSOLUTE EOSINOPHIL (OHS): 0.08 K/UL
ABSOLUTE MONOCYTE (OHS): 0.32 K/UL (ref 0.3–1)
ABSOLUTE NEUTROPHIL COUNT (OHS): 4.46 K/UL (ref 1.8–7.7)
ALBUMIN SERPL BCP-MCNC: 4.1 G/DL (ref 3.5–5.2)
ALP SERPL-CCNC: 57 UNIT/L (ref 40–150)
ALT SERPL W/O P-5'-P-CCNC: 9 UNIT/L (ref 10–44)
ANION GAP (OHS): 9 MMOL/L (ref 8–16)
AST SERPL-CCNC: 16 UNIT/L (ref 11–45)
B-HCG UR QL: NEGATIVE
BASOPHILS # BLD AUTO: 0.02 K/UL
BASOPHILS NFR BLD AUTO: 0.3 %
BILIRUB SERPL-MCNC: 0.8 MG/DL (ref 0.1–1)
BUN SERPL-MCNC: 10 MG/DL (ref 6–20)
CALCIUM SERPL-MCNC: 9 MG/DL (ref 8.7–10.5)
CHLORIDE SERPL-SCNC: 107 MMOL/L (ref 95–110)
CO2 SERPL-SCNC: 24 MMOL/L (ref 23–29)
CREAT SERPL-MCNC: 0.8 MG/DL (ref 0.5–1.4)
CTP QC/QA: YES
ERYTHROCYTE [DISTWIDTH] IN BLOOD BY AUTOMATED COUNT: 15.2 % (ref 11.5–14.5)
GFR SERPLBLD CREATININE-BSD FMLA CKD-EPI: >60 ML/MIN/1.73/M2
GLUCOSE SERPL-MCNC: 97 MG/DL (ref 70–110)
HCT VFR BLD AUTO: 28 % (ref 37–48.5)
HGB BLD-MCNC: 10.4 GM/DL (ref 12–16)
IMM GRANULOCYTES # BLD AUTO: 0.02 K/UL (ref 0–0.04)
IMM GRANULOCYTES NFR BLD AUTO: 0.3 % (ref 0–0.5)
LYMPHOCYTES # BLD AUTO: 1.8 K/UL (ref 1–4.8)
MCH RBC QN AUTO: 31 PG (ref 27–31)
MCHC RBC AUTO-ENTMCNC: 37.1 G/DL (ref 32–36)
MCV RBC AUTO: 83 FL (ref 82–98)
NUCLEATED RBC (/100WBC) (OHS): 0 /100 WBC
PLATELET # BLD AUTO: 122 K/UL (ref 150–450)
PMV BLD AUTO: ABNORMAL FL
POTASSIUM SERPL-SCNC: 3.8 MMOL/L (ref 3.5–5.1)
PROT SERPL-MCNC: 7.8 GM/DL (ref 6–8.4)
RBC # BLD AUTO: 3.36 M/UL (ref 4–5.4)
RELATIVE EOSINOPHIL (OHS): 1.2 %
RELATIVE LYMPHOCYTE (OHS): 26.9 % (ref 18–48)
RELATIVE MONOCYTE (OHS): 4.8 % (ref 4–15)
RELATIVE NEUTROPHIL (OHS): 66.5 % (ref 38–73)
RETICS/RBC NFR AUTO: 5.7 % (ref 0.5–2.5)
SODIUM SERPL-SCNC: 140 MMOL/L (ref 136–145)
WBC # BLD AUTO: 6.7 K/UL (ref 3.9–12.7)

## 2025-06-13 PROCEDURE — 96376 TX/PRO/DX INJ SAME DRUG ADON: CPT

## 2025-06-13 PROCEDURE — 85045 AUTOMATED RETICULOCYTE COUNT: CPT | Performed by: EMERGENCY MEDICINE

## 2025-06-13 PROCEDURE — 99284 EMERGENCY DEPT VISIT MOD MDM: CPT | Mod: 25

## 2025-06-13 PROCEDURE — 80053 COMPREHEN METABOLIC PANEL: CPT | Performed by: EMERGENCY MEDICINE

## 2025-06-13 PROCEDURE — 63600175 PHARM REV CODE 636 W HCPCS: Performed by: EMERGENCY MEDICINE

## 2025-06-13 PROCEDURE — 96375 TX/PRO/DX INJ NEW DRUG ADDON: CPT

## 2025-06-13 PROCEDURE — 85025 COMPLETE CBC W/AUTO DIFF WBC: CPT | Performed by: EMERGENCY MEDICINE

## 2025-06-13 PROCEDURE — 81025 URINE PREGNANCY TEST: CPT | Performed by: EMERGENCY MEDICINE

## 2025-06-13 PROCEDURE — 96374 THER/PROPH/DIAG INJ IV PUSH: CPT

## 2025-06-13 RX ORDER — MORPHINE SULFATE 2 MG/ML
6 INJECTION, SOLUTION INTRAMUSCULAR; INTRAVENOUS
Refills: 0 | Status: COMPLETED | OUTPATIENT
Start: 2025-06-13 | End: 2025-06-13

## 2025-06-13 RX ORDER — ONDANSETRON HYDROCHLORIDE 2 MG/ML
4 INJECTION, SOLUTION INTRAVENOUS
Status: COMPLETED | OUTPATIENT
Start: 2025-06-13 | End: 2025-06-13

## 2025-06-13 RX ORDER — MORPHINE SULFATE 4 MG/ML
8 INJECTION, SOLUTION INTRAMUSCULAR; INTRAVENOUS
Refills: 0 | Status: COMPLETED | OUTPATIENT
Start: 2025-06-13 | End: 2025-06-13

## 2025-06-13 RX ORDER — KETOROLAC TROMETHAMINE 10 MG/1
10 TABLET, FILM COATED ORAL EVERY 6 HOURS PRN
Qty: 8 TABLET | Refills: 0 | Status: SHIPPED | OUTPATIENT
Start: 2025-06-13 | End: 2025-06-15

## 2025-06-13 RX ORDER — KETOROLAC TROMETHAMINE 30 MG/ML
10 INJECTION, SOLUTION INTRAMUSCULAR; INTRAVENOUS
Status: COMPLETED | OUTPATIENT
Start: 2025-06-13 | End: 2025-06-13

## 2025-06-13 RX ORDER — OXYCODONE HYDROCHLORIDE 5 MG/1
5 TABLET ORAL EVERY 6 HOURS PRN
Qty: 9 TABLET | Refills: 0 | Status: SHIPPED | OUTPATIENT
Start: 2025-06-13 | End: 2025-06-16

## 2025-06-13 RX ADMIN — ONDANSETRON 4 MG: 2 INJECTION INTRAMUSCULAR; INTRAVENOUS at 09:06

## 2025-06-13 RX ADMIN — KETOROLAC TROMETHAMINE 10 MG: 30 INJECTION, SOLUTION INTRAMUSCULAR; INTRAVENOUS at 09:06

## 2025-06-13 RX ADMIN — MORPHINE SULFATE 6 MG: 2 INJECTION, SOLUTION INTRAMUSCULAR; INTRAVENOUS at 10:06

## 2025-06-13 RX ADMIN — MORPHINE SULFATE 8 MG: 4 INJECTION INTRAVENOUS at 09:06

## 2025-06-13 RX ADMIN — ONDANSETRON 4 MG: 2 INJECTION INTRAMUSCULAR; INTRAVENOUS at 10:06

## 2025-06-13 NOTE — PROGRESS NOTES
Subjective:      Patient ID: Murali Tarango is a 29 y.o. female.    Vitals:  vitals were not taken for this visit.     Chief Complaint: Sickle Cell Pain Crisis      Visit Type: TELE AUDIOVISUAL - This visit was conducted virtually based on  subjective information and limited objective exam    Present with the patient at the time of consultation: TELEMED PRESENT WITH PATIENT: None  LOCATION OF PATIENT [facility]   Two patient identifiers used to verify patient- saying out date of birth and full name.       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           Past Surgical History:   Procedure Laterality Date     SECTION  2014     delivery x 2     SECTION N/A 7/10/2020    Procedure:  SECTION;  Surgeon: Zoila Donaldson MD;  Location: [facility] L&D;  Service: OB/GYN;  Laterality: N/A;    EYE SURGERY      left eye     Review of patient's allergies indicates:  No Known Allergies  Medications Ordered Prior to Encounter[1]  Family History   Problem Relation Name Age of Onset    Breast cancer Neg Hx      Colon cancer Neg Hx      Ovarian cancer Neg Hx      Cancer Neg Hx      Diabetes Neg Hx      Eclampsia Neg Hx      Hypertension Neg Hx      Miscarriages / Stillbirths Neg Hx       labor Neg Hx      Stroke Neg Hx         Medications Ordered                [id number] - [facility] [address]   [address] [facility] [address]    Telephone: [phone]   Fax: [phone]   Hours: Not open 24 hours                         E-Prescribed (1 of 1)              ketorolac (TORADOL) 10 mg tablet    Sig: Take 1 tablet (10 mg total) by mouth every 6 (six) hours as needed for Pain. Take with food.  "      Start: 6/13/25     Quantity: 8 tablet Refills: 0                           No questionnaires on file.    30 yo female with sickle cell complains of left knee pain. Onset this am. Pain consistent with sickle cell pain. Patient reports symptoms worse with ambulation. She is currently out of pain medications for management and can't go to ED at this time "I have my kids and no one here to get them." She denies chest pain and SOB. LMP 3 days ago.         Musculoskeletal:  Positive for joint pain.        Objective:   The physical exam was conducted virtually.    AAO x 3 ; no acute distress noted; appearance normal; mood and behavior normal; thought process normal  Head- normocephalic  Nose- appears normal, no discharge or erythema  Eyes- pupils appear normal in size, no drainage, no erythema  Ears- normal appearing; no discharge, no erythema  Mouth- appears normal  Oropharynx- no erythema, lesions  Lungs- breathing at a normal rate, no acute distress noted  Heart- no reports of tachycardia, palpitations, chest pain  Abdomen- non distended, non tender reported by patient  Skin- warm and dry, no erythema or edema noted by patient or visualized  Psych- as above; no si/hi      Assessment:     1. Sickle cell pain crisis        Plan:   Drink plenty of fluids  Trial toradol every 6 hours as needed for management of pain  Go to the emergency room if symptoms fail to improve        Thank you for choosing [facility]!    Our goal in the [facility] is to always provide outstanding medical care. You may receive a survey by mail or e-mail in the next week regarding your experience today. We would greatly appreciate you completing and returning the survey. Your feedback provides us with a way to recognize our staff who provide very good care, and it helps us learn how to improve when your experience was below our aspiration of excellence.         We appreciate you trusting us with your " medical care. We hope you feel better soon. We will be happy to take care of you for all of your future medical needs.    You must understand that you've received an Urgent Care treatment only and that you may be released before all your medical problems are known or treated. You, the patient, will arrange for follow up care as instructed.    Follow up with your PCP or specialty clinic as directed in the next 1-2 weeks if not improved or as needed.  You can call [phone] to schedule an appointment with the appropriate provider.    If your condition worsens we recommend that you receive another evaluation in person, with your primary care provider, urgent care or at the emergency room immediately or contact your primary medical clinics after hours call service to discuss your concerns.         Sickle cell pain crisis  -     ketorolac (TORADOL) 10 mg tablet; Take 1 tablet (10 mg total) by mouth every 6 (six) hours as needed for Pain. Take with food.  Dispense: 8 tablet; Refill: 0                          [1]   Current Outpatient Medications on File Prior to Visit   Medication Sig Dispense Refill    amoxicillin-clavulanate 875-125mg (AUGMENTIN) 875-125 mg per tablet Take 1 tablet by mouth 2 (two) times daily. 14 tablet 0    diclofenac sodium (VOLTAREN ARTHRITIS PAIN) 1 % Gel Apply 2 g topically 4 (four) times daily. 450 g 0    folic acid (FOLVITE) 1 MG tablet Take 1 tablet (1 mg total) by mouth once daily. 90 tablet 0    hydrocodone-acetaminophen (HYCET) solution 7.5-325 mg/15mL Take 10 mLs by mouth every 4 (four) hours as needed for Pain. 120 mL 0    ibuprofen (ADVIL,MOTRIN) 600 MG tablet Take 1 tablet (600 mg total) by mouth every 6 (six) hours as needed for Pain. 20 tablet 0    oxyCODONE-acetaminophen (PERCOCET)  mg per tablet Take 1 tablet by mouth every 4 (four) hours as needed for Pain. 14 tablet 0     No current facility-administered medications on file prior to visit.

## 2025-06-14 NOTE — ED TRIAGE NOTES
Pt presents to ED c/o sickle cell pain crisis. Endorses left knee pain that radiates to mid-thigh that started today. Denies taking any pain meds for relief today. Aaox4, NAD noted

## 2025-06-14 NOTE — ED PROVIDER NOTES
I, Gavin Payne, scribed for, and in the presence of, Robb Martins MD. I performed the scribed service and the documentation accurately describes the services I performed. I attest to the accuracy of the note.       Chief complaint:  Sickle Cell Pain Crisis (Patient report left sided leg pain that started today. She has not taken any pain meds today. Hx of sickle cell. AAOx3)      HPI:  Murali Tinsley STEPH Tarango is a 29 y.o. female with a history of sickle cell disease, left ocular prosthesis status post CVA presenting with constant, severe sickle cell-related pain in her left leg, which began around 0100 this morning. She reports that the pain radiates from her knee down to her ankle and is associated with generalized weakness. She has attempted to apply pressure for relief, but this has provided minimal benefit. Patient notes that her sickle cell pain primarily affects her left arm and left leg. Based on prior episodes, she states that severe pain like this typically worsens over the course of two days. She denies pain to touch, SOB, chest pain, rash, fever, or nausea. Patient has not taken any pain medications yet. She had a telehealth appointment earlier today and was prescribed an anti-inflammatory medication, but has not had the opportunity to pick it up.    ROS: As per HPI and below:  Constitutional: Negative for fever.  Respiratory: Negative for shortness of breath.  Cardiovascular: Negative for chest pain.  Gastrointestinal: Negative for nausea.   Skin: Negative for rash.     Review of patient's allergies indicates:  No Known Allergies    Discharge Medication List as of 6/13/2025 11:01 PM        START taking these medications    Details   oxyCODONE (ROXICODONE) 5 MG immediate release tablet Take 1 tablet (5 mg total) by mouth every 6 (six) hours as needed for Pain., Starting Fri 6/13/2025, Until Mon 6/16/2025 at 2359, Normal           CONTINUE these medications which have NOT CHANGED     Details   amoxicillin-clavulanate 875-125mg (AUGMENTIN) 875-125 mg per tablet Take 1 tablet by mouth 2 (two) times daily., Starting 2025, Normal      diclofenac sodium (VOLTAREN ARTHRITIS PAIN) 1 % Gel Apply 2 g topically 4 (four) times daily., Starting 2024, Until u 2024, Normal      folic acid (FOLVITE) 1 MG tablet Take 1 tablet (1 mg total) by mouth once daily., Starting 2024, Until u 2024, Normal      ibuprofen (ADVIL,MOTRIN) 600 MG tablet Take 1 tablet (600 mg total) by mouth every 6 (six) hours as needed for Pain., Starting 2025, Normal      ketorolac (TORADOL) 10 mg tablet Take 1 tablet (10 mg total) by mouth every 6 (six) hours as needed for Pain. 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           Past Surgical History:   Procedure Laterality Date     SECTION  2014     delivery x 2     SECTION N/A 7/10/2020    Procedure:  SECTION;  Surgeon: Zoila Donaldson MD;  Location: [facility] L&D;  Service: OB/GYN;  Laterality: N/A;    EYE SURGERY      left eye       Social History     Socioeconomic History    Marital status: Single   Tobacco Use    Smoking status: Some Days     Types: Cigarettes    Smokeless tobacco: Never   Substance and Sexual Activity    Alcohol use: No    Drug use: No    Sexual activity: Yes     Partners: Male     Birth control/protection: None     Comment: single       Family History   Problem Relation Name Age of Onset    Breast cancer Neg Hx      Colon cancer Neg Hx      Ovarian cancer Neg Hx      Cancer Neg Hx      Diabetes Neg Hx      Eclampsia Neg Hx      Hypertension Neg Hx      Miscarriages /  Stillbirths Neg Hx       labor Neg Hx      Stroke Neg Hx         Physical Exam:    Vitals:    25 2300   BP: (!) 107/55   Pulse: (!) 52   Resp:    Temp:      GENERAL:  No apparent distress.  Alert. Uncomfortable due to pain.  HEENT:  Moist mucous membranes.  Normocephalic and atraumatic.    NECK:  No swelling.  Midline trachea.   CARDIOVASCULAR:  Regular rate and rhythm.  2+ radial pulses. 2+ DP pulses. 2+ PT pulses.   PULMONARY:  Lungs clear to auscultation bilaterally.  No wheezes, rales, or rhonci.    ABDOMEN:  Non-tender and non-distended.    EXTREMITIES:  Warm and well perfused.  Brisk capillary refill. Lower  extremities symmetrical. Full range of motion from knee to ankle.No pain to palpation.  No erythema, edema, increased warmth, joint effusion.  NEUROLOGICAL:  Normal mental status.  Appropriate and conversant. 5/5 motor strength and equal sensation to light touch in the upper and lower extremities bilaterally.  SKIN:  No rashes or ecchymoses.    BACK:  Atraumatic.  No CVA tenderness to palpation.        Labs Reviewed   COMPREHENSIVE METABOLIC PANEL - Abnormal       Result Value    Sodium 140      Potassium 3.8      Chloride 107      CO2 24      Glucose 97      BUN 10      Creatinine 0.8      Calcium 9.0      Protein Total 7.8      Albumin 4.1      Bilirubin Total 0.8      ALP 57      AST 16      ALT 9 (*)     Anion Gap 9      eGFR >60     RETICULOCYTES - Abnormal    Retic Count, Automated 5.7 (*)    CBC WITH DIFFERENTIAL - Abnormal    WBC 6.70      RBC 3.36 (*)     HGB 10.4 (*)     HCT 28.0 (*)     MCV 83      MCH 31.0      MCHC 37.1 (*)     RDW 15.2 (*)     Platelet Count 122 (*)     MPV        Nucleated RBC 0      Neut % 66.5      Lymph % 26.9      Mono % 4.8      Eos % 1.2      Basophil % 0.3      Imm Grans % 0.3      Neut # 4.46      Lymph # 1.80      Mono # 0.32      Eos # 0.08      Baso # 0.02      Imm Grans # 0.02     CBC W/ AUTO DIFFERENTIAL    Narrative:     The following orders were  created for panel order CBC Auto Differential.  Procedure                               Abnormality         Status                     ---------                               -----------         ------                     CBC with Differential[[id number]]       Abnormal            Final result                 Please view results for these tests on the individual orders.   POCT URINE PREGNANCY    POC Preg Test, Ur Negative       Acceptable Yes         Discharge Medication List as of 6/13/2025 11:01 PM        START taking these medications    Details   oxyCODONE (ROXICODONE) 5 MG immediate release tablet Take 1 tablet (5 mg total) by mouth every 6 (six) hours as needed for Pain., Starting Fri 6/13/2025, Until Mon 6/16/2025 at 2359, Normal           CONTINUE these medications which have NOT CHANGED    Details   amoxicillin-clavulanate 875-125mg (AUGMENTIN) 875-125 mg per tablet Take 1 tablet by mouth 2 (two) times daily., Starting Mon 4/7/2025, Normal      diclofenac sodium (VOLTAREN ARTHRITIS PAIN) 1 % Gel Apply 2 g topically 4 (four) times daily., Starting Fri 8/23/2024, Until Thu 11/21/2024, Normal      folic acid (FOLVITE) 1 MG tablet Take 1 tablet (1 mg total) by mouth once daily., Starting Fri 8/23/2024, Until Thu 11/21/2024, Normal      ibuprofen (ADVIL,MOTRIN) 600 MG tablet Take 1 tablet (600 mg total) by mouth every 6 (six) hours as needed for Pain., Starting Mon 4/7/2025, Normal      ketorolac (TORADOL) 10 mg tablet Take 1 tablet (10 mg total) by mouth every 6 (six) hours as needed for Pain. Take with food., Starting Fri 6/13/2025, Until Sun 6/15/2025 at 2359, Normal             Orders Placed This Encounter   Procedures    CBC Auto Differential    Comprehensive Metabolic Panel    Reticulocytes    CBC with Differential    Ambulatory referral/consult to Hematology / Oncology    Notify Physician    Assess pain    Nursing communication    Pulse Oximetry Continuous    POCT urine pregnancy    Place  peripheral IV       Imaging Results    None         ED Course as of 06/14/25 0039   Fri Jun 13, 2025   3101 Patient reports pain is improved and she wishes to go home to her family. [MR]      ED Course User Index  [MR] Robb Martins MD       MDM:    29 y.o. female with with left leg pain patient this is identical to prior sickle cell pain crisis.  On examination no concern for other neurovascular compromise such as DVT or acute arterial large vessel occlusive disease.  She has excellent distal pulses.  There is no history of trauma.  I doubt fracture or dislocation.  I do not think x-ray imaging is indicated.  There is no appearance of an acute infectious etiologies such as since cellulitis or abscess.  Laboratories reviewed.  Pain much better controlled after serial analgesia with Toradol and morphine.  Patient offered admission but feeling better and wishes to go home.  Short course of analgesia as necessary prescribed pending outpatient follow up with PCP at patient also encouraged to establish care with Hematology.  Return precautions reviewed in detail.    Diagnoses:    1. Sickle cell pain crisis      Physician Attestation for Scribe: I, Dr. Robb Martins, reviewed documentation as scribed in my presence, which is both accurate and complete.       Robb Martins MD  06/14/25 0039